# Patient Record
Sex: FEMALE | Race: WHITE | NOT HISPANIC OR LATINO | URBAN - METROPOLITAN AREA
[De-identification: names, ages, dates, MRNs, and addresses within clinical notes are randomized per-mention and may not be internally consistent; named-entity substitution may affect disease eponyms.]

---

## 2017-12-11 ENCOUNTER — IMPORTED ENCOUNTER (OUTPATIENT)
Dept: URBAN - METROPOLITAN AREA CLINIC 50 | Facility: CLINIC | Age: 67
End: 2017-12-11

## 2017-12-11 NOTE — PATIENT DISCUSSION
"""Continue Xalatan both eyes at bedtime. "" ""Reassured patient that intraocular pressures (IOPs) are at target levels and other ocular findings are stable. Continue present management and/or medication(s).  Follow up as directed. """

## 2018-06-11 ENCOUNTER — IMPORTED ENCOUNTER (OUTPATIENT)
Dept: URBAN - METROPOLITAN AREA CLINIC 50 | Facility: CLINIC | Age: 68
End: 2018-06-11

## 2018-06-11 NOTE — PATIENT DISCUSSION
"""Dr Paulette Markham to review results with patient today"" ""Reassured patient that intraocular pressures (IOPs) are at target levels and other ocular findings are stable. Continue present management and/or medication(s).  Follow up as directed. "" ""Continue Xalatan both eyes at bedtime. """

## 2018-12-03 ENCOUNTER — IMPORTED ENCOUNTER (OUTPATIENT)
Dept: URBAN - METROPOLITAN AREA CLINIC 50 | Facility: CLINIC | Age: 68
End: 2018-12-03

## 2018-12-03 NOTE — PATIENT DISCUSSION
"""Reassured patient that intraocular pressures (IOPs) are at target levels and other ocular findings are stable. Continue present management and/or medication(s).  Follow up as directed. "" ""Continue Xalatan both eyes at bedtime ."""

## 2019-06-17 ENCOUNTER — IMPORTED ENCOUNTER (OUTPATIENT)
Dept: URBAN - METROPOLITAN AREA CLINIC 50 | Facility: CLINIC | Age: 69
End: 2019-06-17

## 2019-06-17 NOTE — PATIENT DISCUSSION
"""Dr. Armida Cintron to review test results with patient at todays appointment. "" ""Continue Xalatan both eyes at bedtime ."""

## 2021-04-18 ASSESSMENT — VISUAL ACUITY
OS_CC: 20/25+2
OD_BAT: 20/30
OS_OTHER: 20/30. 20/30-.
OD_CC: J1+(-1)
OS_OTHER: 20/30-1. 20/60.
OS_CC: 20/30+
OD_OTHER: 20/40-. 20/100.
OD_CC: J1+
OS_CC: 20/30+2
OD_CC: 20/40-1
OD_CC: 20/30
OD_CC: 20/30
OS_CC: J1+
OS_CC: J1+(-1)
OS_BAT: 20/30-1
OS_BAT: 20/30
OD_OTHER: 20/30. 20/50.
OS_CC: 20/30-1
OD_BAT: 20/40-
OD_CC: 20/40

## 2021-04-18 ASSESSMENT — TONOMETRY
OD_IOP_MMHG: 17
OD_IOP_MMHG: 17
OS_IOP_MMHG: 15
OS_IOP_MMHG: 12
OS_IOP_MMHG: 15
OS_IOP_MMHG: 18
OD_IOP_MMHG: 14
OD_IOP_MMHG: 14
OD_IOP_MMHG: 21
OD_IOP_MMHG: 14
OS_IOP_MMHG: 21
OS_IOP_MMHG: 14
OS_IOP_MMHG: 17
OS_IOP_MMHG: 18
OD_IOP_MMHG: 18
OD_IOP_MMHG: 11

## 2021-04-18 ASSESSMENT — PACHYMETRY
OS_CT_UM: 601
OD_CT_UM: 600
OD_CT_UM: 600
OS_CT_UM: 601
OD_CT_UM: 600
OD_CT_UM: 600

## 2021-05-30 ENCOUNTER — APPOINTMENT (OUTPATIENT)
Dept: GENERAL RADIOLOGY | Facility: HOSPITAL | Age: 71
End: 2021-05-30

## 2021-05-30 ENCOUNTER — HOSPITAL ENCOUNTER (INPATIENT)
Facility: HOSPITAL | Age: 71
LOS: 5 days | Discharge: NURSING, REHAB OR LONG-TERM CARE | End: 2021-06-04
Attending: FAMILY MEDICINE | Admitting: INTERNAL MEDICINE

## 2021-05-30 ENCOUNTER — APPOINTMENT (OUTPATIENT)
Dept: CT IMAGING | Facility: HOSPITAL | Age: 71
End: 2021-05-30

## 2021-05-30 DIAGNOSIS — N39.0 ACUTE UTI: ICD-10-CM

## 2021-05-30 DIAGNOSIS — S80.01XA CONTUSION OF RIGHT KNEE, INITIAL ENCOUNTER: ICD-10-CM

## 2021-05-30 DIAGNOSIS — S16.1XXA STRAIN OF NECK MUSCLE, INITIAL ENCOUNTER: ICD-10-CM

## 2021-05-30 DIAGNOSIS — S43.401A SPRAIN OF RIGHT SHOULDER, UNSPECIFIED SHOULDER SPRAIN TYPE, INITIAL ENCOUNTER: Primary | ICD-10-CM

## 2021-05-30 LAB
A-A DO2: 29.2 MMHG (ref 0–300)
ALBUMIN SERPL-MCNC: 4.17 G/DL (ref 3.5–5.2)
ALBUMIN/GLOB SERPL: 1 G/DL
ALP SERPL-CCNC: 133 U/L (ref 39–117)
ALT SERPL W P-5'-P-CCNC: 17 U/L (ref 1–33)
ANION GAP SERPL CALCULATED.3IONS-SCNC: 17 MMOL/L (ref 5–15)
ARTERIAL PATENCY WRIST A: POSITIVE
AST SERPL-CCNC: 17 U/L (ref 1–32)
ATMOSPHERIC PRESS: 734 MMHG
BACTERIA UR QL AUTO: ABNORMAL /HPF
BASE EXCESS BLDA CALC-SCNC: 6.6 MMOL/L (ref 0–2)
BASOPHILS # BLD MANUAL: 0.38 10*3/MM3 (ref 0–0.2)
BASOPHILS NFR BLD AUTO: 2 % (ref 0–1.5)
BDY SITE: ABNORMAL
BILIRUB SERPL-MCNC: 1 MG/DL (ref 0–1.2)
BILIRUB UR QL STRIP: NEGATIVE
BODY TEMPERATURE: 0 C
BUN SERPL-MCNC: 13 MG/DL (ref 8–23)
BUN/CREAT SERPL: 12.1 (ref 7–25)
CALCIUM SPEC-SCNC: 10.1 MG/DL (ref 8.6–10.5)
CHLORIDE SERPL-SCNC: 93 MMOL/L (ref 98–107)
CLARITY UR: ABNORMAL
CO2 BLDA-SCNC: 31.9 MMOL/L (ref 22–33)
CO2 SERPL-SCNC: 26 MMOL/L (ref 22–29)
COHGB MFR BLD: 1 % (ref 0–5)
COLOR UR: YELLOW
CREAT SERPL-MCNC: 1.07 MG/DL (ref 0.57–1)
CRP SERPL-MCNC: 1.3 MG/DL (ref 0–0.5)
D-LACTATE SERPL-SCNC: 1.9 MMOL/L (ref 0.5–2)
DEPRECATED RDW RBC AUTO: 42 FL (ref 37–54)
EOSINOPHIL # BLD MANUAL: 0.19 10*3/MM3 (ref 0–0.4)
EOSINOPHIL NFR BLD MANUAL: 1 % (ref 0.3–6.2)
ERYTHROCYTE [DISTWIDTH] IN BLOOD BY AUTOMATED COUNT: 12.5 % (ref 12.3–15.4)
FLUAV RNA RESP QL NAA+PROBE: NOT DETECTED
FLUBV RNA RESP QL NAA+PROBE: NOT DETECTED
GFR SERPL CREATININE-BSD FRML MDRD: 50 ML/MIN/1.73
GLOBULIN UR ELPH-MCNC: 4 GM/DL
GLUCOSE SERPL-MCNC: 309 MG/DL (ref 65–99)
GLUCOSE UR STRIP-MCNC: ABNORMAL MG/DL
HCO3 BLDA-SCNC: 30.6 MMOL/L (ref 20–26)
HCT VFR BLD AUTO: 52.5 % (ref 34–46.6)
HCT VFR BLD CALC: 49 % (ref 38–51)
HGB BLD-MCNC: 17 G/DL (ref 12–15.9)
HGB BLDA-MCNC: 16 G/DL (ref 13.5–17.5)
HGB UR QL STRIP.AUTO: NEGATIVE
HYALINE CASTS UR QL AUTO: ABNORMAL /LPF
HYPOCHROMIA BLD QL: ABNORMAL
INHALED O2 CONCENTRATION: 21 %
KETONES UR QL STRIP: ABNORMAL
LEUKOCYTE ESTERASE UR QL STRIP.AUTO: ABNORMAL
LYMPHOCYTES # BLD MANUAL: 5.92 10*3/MM3 (ref 0.7–3.1)
LYMPHOCYTES NFR BLD MANUAL: 31 % (ref 19.6–45.3)
LYMPHOCYTES NFR BLD MANUAL: 8 % (ref 5–12)
Lab: ABNORMAL
MCH RBC QN AUTO: 29.6 PG (ref 26.6–33)
MCHC RBC AUTO-ENTMCNC: 32.4 G/DL (ref 31.5–35.7)
MCV RBC AUTO: 91.5 FL (ref 79–97)
METHGB BLD QL: 0.1 % (ref 0–3)
MODALITY: ABNORMAL
MONOCYTES # BLD AUTO: 1.53 10*3/MM3 (ref 0.1–0.9)
NEUTROPHILS # BLD AUTO: 11.08 10*3/MM3 (ref 1.7–7)
NEUTROPHILS NFR BLD MANUAL: 56 % (ref 42.7–76)
NEUTS BAND NFR BLD MANUAL: 2 % (ref 0–5)
NITRITE UR QL STRIP: POSITIVE
NOTE: ABNORMAL
NOTIFIED BY: ABNORMAL
NOTIFIED WHO: ABNORMAL
OXYHGB MFR BLDV: 94 % (ref 94–99)
PCO2 BLDA: 40.6 MM HG (ref 35–45)
PCO2 TEMP ADJ BLD: ABNORMAL MM[HG]
PH BLDA: 7.49 PH UNITS (ref 7.35–7.45)
PH UR STRIP.AUTO: 5.5 [PH] (ref 5–8)
PH, TEMP CORRECTED: ABNORMAL
PLAT MORPH BLD: NORMAL
PLATELET # BLD AUTO: 358 10*3/MM3 (ref 140–450)
PMV BLD AUTO: 8.5 FL (ref 6–12)
PO2 BLDA: 69.2 MM HG (ref 83–108)
PO2 TEMP ADJ BLD: ABNORMAL MM[HG]
POTASSIUM SERPL-SCNC: 3.8 MMOL/L (ref 3.5–5.2)
PROT SERPL-MCNC: 8.2 G/DL (ref 6–8.5)
PROT UR QL STRIP: ABNORMAL
RBC # BLD AUTO: 5.74 10*6/MM3 (ref 3.77–5.28)
RBC # UR: ABNORMAL /HPF
REF LAB TEST METHOD: ABNORMAL
SAO2 % BLDCOA: 95 % (ref 94–99)
SARS-COV-2 RNA RESP QL NAA+PROBE: NOT DETECTED
SODIUM SERPL-SCNC: 136 MMOL/L (ref 136–145)
SP GR UR STRIP: 1.02 (ref 1–1.03)
SQUAMOUS #/AREA URNS HPF: ABNORMAL /HPF
UROBILINOGEN UR QL STRIP: ABNORMAL
VENTILATOR MODE: ABNORMAL
WBC # BLD AUTO: 19.1 10*3/MM3 (ref 3.4–10.8)
WBC UR QL AUTO: ABNORMAL /HPF

## 2021-05-30 PROCEDURE — 73564 X-RAY EXAM KNEE 4 OR MORE: CPT

## 2021-05-30 PROCEDURE — 93005 ELECTROCARDIOGRAM TRACING: CPT | Performed by: HOSPITALIST

## 2021-05-30 PROCEDURE — 73030 X-RAY EXAM OF SHOULDER: CPT

## 2021-05-30 PROCEDURE — 83050 HGB METHEMOGLOBIN QUAN: CPT

## 2021-05-30 PROCEDURE — 80053 COMPREHEN METABOLIC PANEL: CPT | Performed by: EMERGENCY MEDICINE

## 2021-05-30 PROCEDURE — 25010000002 CEFTRIAXONE PER 250 MG: Performed by: EMERGENCY MEDICINE

## 2021-05-30 PROCEDURE — 87040 BLOOD CULTURE FOR BACTERIA: CPT | Performed by: EMERGENCY MEDICINE

## 2021-05-30 PROCEDURE — 86140 C-REACTIVE PROTEIN: CPT | Performed by: HOSPITALIST

## 2021-05-30 PROCEDURE — 87086 URINE CULTURE/COLONY COUNT: CPT | Performed by: EMERGENCY MEDICINE

## 2021-05-30 PROCEDURE — 87186 SC STD MICRODIL/AGAR DIL: CPT | Performed by: EMERGENCY MEDICINE

## 2021-05-30 PROCEDURE — 85007 BL SMEAR W/DIFF WBC COUNT: CPT | Performed by: EMERGENCY MEDICINE

## 2021-05-30 PROCEDURE — 99285 EMERGENCY DEPT VISIT HI MDM: CPT

## 2021-05-30 PROCEDURE — 82009 KETONE BODYS QUAL: CPT | Performed by: HOSPITALIST

## 2021-05-30 PROCEDURE — 87077 CULTURE AEROBIC IDENTIFY: CPT | Performed by: EMERGENCY MEDICINE

## 2021-05-30 PROCEDURE — 82375 ASSAY CARBOXYHB QUANT: CPT

## 2021-05-30 PROCEDURE — 99223 1ST HOSP IP/OBS HIGH 75: CPT | Performed by: PHYSICIAN ASSISTANT

## 2021-05-30 PROCEDURE — 36600 WITHDRAWAL OF ARTERIAL BLOOD: CPT

## 2021-05-30 PROCEDURE — 85025 COMPLETE CBC W/AUTO DIFF WBC: CPT | Performed by: EMERGENCY MEDICINE

## 2021-05-30 PROCEDURE — 70450 CT HEAD/BRAIN W/O DYE: CPT

## 2021-05-30 PROCEDURE — 82805 BLOOD GASES W/O2 SATURATION: CPT

## 2021-05-30 PROCEDURE — 74176 CT ABD & PELVIS W/O CONTRAST: CPT

## 2021-05-30 PROCEDURE — 73200 CT UPPER EXTREMITY W/O DYE: CPT

## 2021-05-30 PROCEDURE — 72125 CT NECK SPINE W/O DYE: CPT

## 2021-05-30 PROCEDURE — 72131 CT LUMBAR SPINE W/O DYE: CPT

## 2021-05-30 PROCEDURE — 73610 X-RAY EXAM OF ANKLE: CPT

## 2021-05-30 PROCEDURE — 83605 ASSAY OF LACTIC ACID: CPT | Performed by: EMERGENCY MEDICINE

## 2021-05-30 PROCEDURE — 81001 URINALYSIS AUTO W/SCOPE: CPT | Performed by: EMERGENCY MEDICINE

## 2021-05-30 PROCEDURE — 87636 SARSCOV2 & INF A&B AMP PRB: CPT | Performed by: EMERGENCY MEDICINE

## 2021-05-30 RX ORDER — POLYETHYLENE GLYCOL 3350 17 G/17G
17 POWDER, FOR SOLUTION ORAL 2 TIMES DAILY PRN
Status: DISCONTINUED | OUTPATIENT
Start: 2021-05-30 | End: 2021-06-04 | Stop reason: HOSPADM

## 2021-05-30 RX ORDER — DOCUSATE SODIUM 100 MG/1
100 CAPSULE, LIQUID FILLED ORAL 2 TIMES DAILY
Status: DISCONTINUED | OUTPATIENT
Start: 2021-05-31 | End: 2021-06-04 | Stop reason: HOSPADM

## 2021-05-30 RX ORDER — INSULIN GLARGINE 100 [IU]/ML
45 INJECTION, SOLUTION SUBCUTANEOUS DAILY
COMMUNITY
End: 2021-12-14

## 2021-05-30 RX ORDER — DEXTROSE MONOHYDRATE 25 G/50ML
25 INJECTION, SOLUTION INTRAVENOUS
Status: DISCONTINUED | OUTPATIENT
Start: 2021-05-30 | End: 2021-06-04 | Stop reason: HOSPADM

## 2021-05-30 RX ORDER — BISACODYL 10 MG
10 SUPPOSITORY, RECTAL RECTAL ONCE AS NEEDED
Status: DISCONTINUED | OUTPATIENT
Start: 2021-05-30 | End: 2021-06-04 | Stop reason: HOSPADM

## 2021-05-30 RX ORDER — TIZANIDINE 2 MG/1
2 TABLET ORAL 2 TIMES DAILY
COMMUNITY

## 2021-05-30 RX ORDER — ESOMEPRAZOLE MAGNESIUM 40 MG/1
40 CAPSULE, DELAYED RELEASE ORAL
COMMUNITY
End: 2023-01-23

## 2021-05-30 RX ORDER — DULOXETIN HYDROCHLORIDE 60 MG/1
60 CAPSULE, DELAYED RELEASE ORAL DAILY
COMMUNITY
End: 2021-12-18 | Stop reason: HOSPADM

## 2021-05-30 RX ORDER — HYDROCODONE BITARTRATE AND ACETAMINOPHEN 5; 325 MG/1; MG/1
1 TABLET ORAL EVERY 6 HOURS PRN
Qty: 10 TABLET | Refills: 0 | Status: SHIPPED | OUTPATIENT
Start: 2021-05-30 | End: 2021-06-04

## 2021-05-30 RX ORDER — ALBUTEROL SULFATE 90 UG/1
2 AEROSOL, METERED RESPIRATORY (INHALATION) EVERY 6 HOURS PRN
COMMUNITY

## 2021-05-30 RX ORDER — DONEPEZIL HYDROCHLORIDE 5 MG/1
5 TABLET, FILM COATED ORAL DAILY
COMMUNITY

## 2021-05-30 RX ORDER — POTASSIUM CHLORIDE 750 MG/1
10 TABLET, FILM COATED, EXTENDED RELEASE ORAL DAILY
COMMUNITY

## 2021-05-30 RX ORDER — TRIAMTERENE AND HYDROCHLOROTHIAZIDE 37.5; 25 MG/1; MG/1
1 TABLET ORAL DAILY
COMMUNITY

## 2021-05-30 RX ORDER — BUDESONIDE AND FORMOTEROL FUMARATE DIHYDRATE 80; 4.5 UG/1; UG/1
2 AEROSOL RESPIRATORY (INHALATION)
Status: ON HOLD | COMMUNITY
End: 2021-05-31

## 2021-05-30 RX ORDER — TRAZODONE HYDROCHLORIDE 100 MG/1
100 TABLET ORAL NIGHTLY
Status: ON HOLD | COMMUNITY
End: 2021-05-31

## 2021-05-30 RX ORDER — BENZONATATE 100 MG/1
100 CAPSULE ORAL 3 TIMES DAILY PRN
COMMUNITY
End: 2021-12-14

## 2021-05-30 RX ORDER — FUROSEMIDE 20 MG/1
10 TABLET ORAL DAILY
Status: ON HOLD | COMMUNITY
End: 2021-05-31

## 2021-05-30 RX ORDER — MONTELUKAST SODIUM 10 MG/1
10 TABLET ORAL DAILY
COMMUNITY

## 2021-05-30 RX ORDER — SODIUM CHLORIDE 0.9 % (FLUSH) 0.9 %
10 SYRINGE (ML) INJECTION AS NEEDED
Status: DISCONTINUED | OUTPATIENT
Start: 2021-05-30 | End: 2021-06-04 | Stop reason: HOSPADM

## 2021-05-30 RX ORDER — LOSARTAN POTASSIUM 50 MG/1
50 TABLET ORAL DAILY
Status: ON HOLD | COMMUNITY
End: 2021-05-31

## 2021-05-30 RX ORDER — NICOTINE POLACRILEX 4 MG
15 LOZENGE BUCCAL
Status: DISCONTINUED | OUTPATIENT
Start: 2021-05-30 | End: 2021-06-04 | Stop reason: HOSPADM

## 2021-05-30 RX ADMIN — CEFTRIAXONE 1 G: 1 INJECTION, POWDER, FOR SOLUTION INTRAMUSCULAR; INTRAVENOUS at 23:09

## 2021-05-31 ENCOUNTER — APPOINTMENT (OUTPATIENT)
Dept: ULTRASOUND IMAGING | Facility: HOSPITAL | Age: 71
End: 2021-05-31

## 2021-05-31 ENCOUNTER — APPOINTMENT (OUTPATIENT)
Dept: GENERAL RADIOLOGY | Facility: HOSPITAL | Age: 71
End: 2021-05-31

## 2021-05-31 ENCOUNTER — APPOINTMENT (OUTPATIENT)
Dept: CARDIOLOGY | Facility: HOSPITAL | Age: 71
End: 2021-05-31

## 2021-05-31 PROBLEM — F03.90 DEMENTIA (HCC): Chronic | Status: ACTIVE | Noted: 2021-05-31

## 2021-05-31 PROBLEM — K57.90 DIVERTICULOSIS: Chronic | Status: ACTIVE | Noted: 2021-05-31

## 2021-05-31 PROBLEM — M80.80XA OSTEOPOROSIS WITH FRACTURE: Chronic | Status: ACTIVE | Noted: 2021-05-31

## 2021-05-31 PROBLEM — I48.0 PAROXYSMAL ATRIAL FIBRILLATION (HCC): Chronic | Status: ACTIVE | Noted: 2021-05-31

## 2021-05-31 PROBLEM — Z79.4 TYPE 2 DIABETES MELLITUS, WITH LONG-TERM CURRENT USE OF INSULIN: Chronic | Status: ACTIVE | Noted: 2021-05-31

## 2021-05-31 PROBLEM — J44.9 COPD (CHRONIC OBSTRUCTIVE PULMONARY DISEASE): Chronic | Status: ACTIVE | Noted: 2021-05-31

## 2021-05-31 PROBLEM — E11.9 TYPE 2 DIABETES MELLITUS, WITH LONG-TERM CURRENT USE OF INSULIN: Chronic | Status: ACTIVE | Noted: 2021-05-31

## 2021-05-31 PROBLEM — K21.9 GERD WITHOUT ESOPHAGITIS: Chronic | Status: ACTIVE | Noted: 2021-05-31

## 2021-05-31 LAB
ACETONE BLD QL: NEGATIVE
ALBUMIN SERPL-MCNC: 3.78 G/DL (ref 3.5–5.2)
ALBUMIN/GLOB SERPL: 1.1 G/DL
ALP SERPL-CCNC: 118 U/L (ref 39–117)
ALT SERPL W P-5'-P-CCNC: 13 U/L (ref 1–33)
ANION GAP SERPL CALCULATED.3IONS-SCNC: 13.7 MMOL/L (ref 5–15)
AST SERPL-CCNC: 14 U/L (ref 1–32)
BASOPHILS # BLD AUTO: 0.06 10*3/MM3 (ref 0–0.2)
BASOPHILS NFR BLD AUTO: 0.4 % (ref 0–1.5)
BH CV ECHO MEAS - % IVS THICK: 36.1 %
BH CV ECHO MEAS - % LVPW THICK: 61.7 %
BH CV ECHO MEAS - ACS: 2 CM
BH CV ECHO MEAS - AO ROOT AREA (BSA CORRECTED): 1.5
BH CV ECHO MEAS - AO ROOT AREA: 7.8 CM^2
BH CV ECHO MEAS - AO ROOT DIAM: 3.2 CM
BH CV ECHO MEAS - BSA(HAYCOCK): 2.3 M^2
BH CV ECHO MEAS - BSA: 2.2 M^2
BH CV ECHO MEAS - BZI_BMI: 45.5 KILOGRAMS/M^2
BH CV ECHO MEAS - BZI_METRIC_HEIGHT: 160 CM
BH CV ECHO MEAS - BZI_METRIC_WEIGHT: 116.6 KG
BH CV ECHO MEAS - EDV(CUBED): 42.1 ML
BH CV ECHO MEAS - EDV(MOD-SP4): 37.1 ML
BH CV ECHO MEAS - EDV(TEICH): 50.2 ML
BH CV ECHO MEAS - EF(CUBED): 78.8 %
BH CV ECHO MEAS - EF(MOD-SP4): 44.7 %
BH CV ECHO MEAS - EF(TEICH): 72.1 %
BH CV ECHO MEAS - ESV(CUBED): 8.9 ML
BH CV ECHO MEAS - ESV(MOD-SP4): 20.5 ML
BH CV ECHO MEAS - ESV(TEICH): 14 ML
BH CV ECHO MEAS - FS: 40.4 %
BH CV ECHO MEAS - IVS/LVPW: 1.2
BH CV ECHO MEAS - IVSD: 1.5 CM
BH CV ECHO MEAS - IVSS: 2 CM
BH CV ECHO MEAS - LA DIMENSION: 4.3 CM
BH CV ECHO MEAS - LA/AO: 1.4
BH CV ECHO MEAS - LV DIASTOLIC VOL/BSA (35-75): 17.2 ML/M^2
BH CV ECHO MEAS - LV MASS(C)D: 157.7 GRAMS
BH CV ECHO MEAS - LV MASS(C)DI: 73.3 GRAMS/M^2
BH CV ECHO MEAS - LV MASS(C)S: 172.4 GRAMS
BH CV ECHO MEAS - LV MASS(C)SI: 80.2 GRAMS/M^2
BH CV ECHO MEAS - LV SYSTOLIC VOL/BSA (12-30): 9.5 ML/M^2
BH CV ECHO MEAS - LVIDD: 3.5 CM
BH CV ECHO MEAS - LVIDS: 2.1 CM
BH CV ECHO MEAS - LVLD AP4: 5.8 CM
BH CV ECHO MEAS - LVLS AP4: 6.3 CM
BH CV ECHO MEAS - LVOT AREA (M): 2.5 CM^2
BH CV ECHO MEAS - LVOT AREA: 2.5 CM^2
BH CV ECHO MEAS - LVOT DIAM: 1.8 CM
BH CV ECHO MEAS - LVPWD: 1.2 CM
BH CV ECHO MEAS - LVPWS: 1.9 CM
BH CV ECHO MEAS - MV A MAX VEL: 115 CM/SEC
BH CV ECHO MEAS - MV E MAX VEL: 69 CM/SEC
BH CV ECHO MEAS - MV E/A: 0.6
BH CV ECHO MEAS - PA ACC TIME: 0.12 SEC
BH CV ECHO MEAS - PA PR(ACCEL): 23.7 MMHG
BH CV ECHO MEAS - RVDD: 3.6 CM
BH CV ECHO MEAS - SI(CUBED): 15.4 ML/M^2
BH CV ECHO MEAS - SI(MOD-SP4): 7.7 ML/M^2
BH CV ECHO MEAS - SI(TEICH): 16.8 ML/M^2
BH CV ECHO MEAS - SV(CUBED): 33.2 ML
BH CV ECHO MEAS - SV(MOD-SP4): 16.6 ML
BH CV ECHO MEAS - SV(TEICH): 36.2 ML
BILIRUB SERPL-MCNC: 0.9 MG/DL (ref 0–1.2)
BUN SERPL-MCNC: 13 MG/DL (ref 8–23)
BUN/CREAT SERPL: 13.5 (ref 7–25)
CALCIUM SPEC-SCNC: 9.2 MG/DL (ref 8.6–10.5)
CHLORIDE SERPL-SCNC: 95 MMOL/L (ref 98–107)
CO2 SERPL-SCNC: 26.3 MMOL/L (ref 22–29)
CREAT SERPL-MCNC: 0.96 MG/DL (ref 0.57–1)
CRP SERPL-MCNC: 1.45 MG/DL (ref 0–0.5)
DEPRECATED RDW RBC AUTO: 42.7 FL (ref 37–54)
EOSINOPHIL # BLD AUTO: 0.16 10*3/MM3 (ref 0–0.4)
EOSINOPHIL NFR BLD AUTO: 1.2 % (ref 0.3–6.2)
ERYTHROCYTE [DISTWIDTH] IN BLOOD BY AUTOMATED COUNT: 12.6 % (ref 12.3–15.4)
GFR SERPL CREATININE-BSD FRML MDRD: 57 ML/MIN/1.73
GLOBULIN UR ELPH-MCNC: 3.4 GM/DL
GLUCOSE BLDC GLUCOMTR-MCNC: 230 MG/DL (ref 70–130)
GLUCOSE BLDC GLUCOMTR-MCNC: 283 MG/DL (ref 70–130)
GLUCOSE BLDC GLUCOMTR-MCNC: 300 MG/DL (ref 70–130)
GLUCOSE BLDC GLUCOMTR-MCNC: 326 MG/DL (ref 70–130)
GLUCOSE BLDC GLUCOMTR-MCNC: 343 MG/DL (ref 70–130)
GLUCOSE SERPL-MCNC: 324 MG/DL (ref 65–99)
HBA1C MFR BLD: 11.4 % (ref 4.8–5.6)
HCT VFR BLD AUTO: 46.5 % (ref 34–46.6)
HGB BLD-MCNC: 15.2 G/DL (ref 12–15.9)
IMM GRANULOCYTES # BLD AUTO: 0.03 10*3/MM3 (ref 0–0.05)
IMM GRANULOCYTES NFR BLD AUTO: 0.2 % (ref 0–0.5)
LYMPHOCYTES # BLD AUTO: 3.06 10*3/MM3 (ref 0.7–3.1)
LYMPHOCYTES NFR BLD AUTO: 22.6 % (ref 19.6–45.3)
MAXIMAL PREDICTED HEART RATE: 147 BPM
MCH RBC QN AUTO: 30 PG (ref 26.6–33)
MCHC RBC AUTO-ENTMCNC: 32.7 G/DL (ref 31.5–35.7)
MCV RBC AUTO: 91.9 FL (ref 79–97)
MONOCYTES # BLD AUTO: 0.73 10*3/MM3 (ref 0.1–0.9)
MONOCYTES NFR BLD AUTO: 5.4 % (ref 5–12)
NEUTROPHILS NFR BLD AUTO: 70.2 % (ref 42.7–76)
NEUTROPHILS NFR BLD AUTO: 9.47 10*3/MM3 (ref 1.7–7)
NRBC BLD AUTO-RTO: 0 /100 WBC (ref 0–0.2)
PLATELET # BLD AUTO: 284 10*3/MM3 (ref 140–450)
PMV BLD AUTO: 8.4 FL (ref 6–12)
POTASSIUM SERPL-SCNC: 3.4 MMOL/L (ref 3.5–5.2)
PROT SERPL-MCNC: 7.2 G/DL (ref 6–8.5)
RBC # BLD AUTO: 5.06 10*6/MM3 (ref 3.77–5.28)
SODIUM SERPL-SCNC: 135 MMOL/L (ref 136–145)
STRESS TARGET HR: 125 BPM
TROPONIN T SERPL-MCNC: <0.01 NG/ML (ref 0–0.03)
TROPONIN T SERPL-MCNC: <0.01 NG/ML (ref 0–0.03)
WBC # BLD AUTO: 13.51 10*3/MM3 (ref 3.4–10.8)

## 2021-05-31 PROCEDURE — 93880 EXTRACRANIAL BILAT STUDY: CPT | Performed by: RADIOLOGY

## 2021-05-31 PROCEDURE — 93306 TTE W/DOPPLER COMPLETE: CPT

## 2021-05-31 PROCEDURE — 99232 SBSQ HOSP IP/OBS MODERATE 35: CPT | Performed by: INTERNAL MEDICINE

## 2021-05-31 PROCEDURE — 97166 OT EVAL MOD COMPLEX 45 MIN: CPT

## 2021-05-31 PROCEDURE — 97163 PT EVAL HIGH COMPLEX 45 MIN: CPT

## 2021-05-31 PROCEDURE — 63710000001 INSULIN ASPART PER 5 UNITS

## 2021-05-31 PROCEDURE — 84484 ASSAY OF TROPONIN QUANT: CPT | Performed by: HOSPITALIST

## 2021-05-31 PROCEDURE — 71045 X-RAY EXAM CHEST 1 VIEW: CPT

## 2021-05-31 PROCEDURE — 25010000002 HEPARIN (PORCINE) PER 1000 UNITS: Performed by: HOSPITALIST

## 2021-05-31 PROCEDURE — 93880 EXTRACRANIAL BILAT STUDY: CPT

## 2021-05-31 PROCEDURE — 63710000001 INSULIN ASPART PER 5 UNITS: Performed by: HOSPITALIST

## 2021-05-31 PROCEDURE — 86140 C-REACTIVE PROTEIN: CPT | Performed by: HOSPITALIST

## 2021-05-31 PROCEDURE — 92610 EVALUATE SWALLOWING FUNCTION: CPT

## 2021-05-31 PROCEDURE — 63710000001 INSULIN DETEMIR PER 5 UNITS: Performed by: HOSPITALIST

## 2021-05-31 PROCEDURE — 83036 HEMOGLOBIN GLYCOSYLATED A1C: CPT | Performed by: HOSPITALIST

## 2021-05-31 PROCEDURE — 80053 COMPREHEN METABOLIC PANEL: CPT | Performed by: HOSPITALIST

## 2021-05-31 PROCEDURE — 94799 UNLISTED PULMONARY SVC/PX: CPT

## 2021-05-31 PROCEDURE — 25010000002 CEFTRIAXONE PER 250 MG: Performed by: HOSPITALIST

## 2021-05-31 PROCEDURE — 85025 COMPLETE CBC W/AUTO DIFF WBC: CPT | Performed by: HOSPITALIST

## 2021-05-31 PROCEDURE — 93306 TTE W/DOPPLER COMPLETE: CPT | Performed by: INTERNAL MEDICINE

## 2021-05-31 PROCEDURE — 93010 ELECTROCARDIOGRAM REPORT: CPT | Performed by: INTERNAL MEDICINE

## 2021-05-31 PROCEDURE — 82962 GLUCOSE BLOOD TEST: CPT

## 2021-05-31 RX ORDER — ACETAMINOPHEN 325 MG/1
650 TABLET ORAL EVERY 6 HOURS PRN
Status: DISCONTINUED | OUTPATIENT
Start: 2021-05-31 | End: 2021-06-04 | Stop reason: HOSPADM

## 2021-05-31 RX ORDER — ALBUTEROL SULFATE 2.5 MG/3ML
2.5 SOLUTION RESPIRATORY (INHALATION) EVERY 6 HOURS PRN
Status: CANCELLED | OUTPATIENT
Start: 2021-05-31

## 2021-05-31 RX ORDER — TIZANIDINE 4 MG/1
2 TABLET ORAL 2 TIMES DAILY
Status: CANCELLED | OUTPATIENT
Start: 2021-05-31

## 2021-05-31 RX ORDER — GUAIFENESIN/DEXTROMETHORPHAN 100-10MG/5
5 SYRUP ORAL ONCE
Status: COMPLETED | OUTPATIENT
Start: 2021-05-31 | End: 2021-05-31

## 2021-05-31 RX ORDER — SODIUM CHLORIDE 9 MG/ML
75 INJECTION, SOLUTION INTRAVENOUS CONTINUOUS
Status: DISCONTINUED | OUTPATIENT
Start: 2021-05-31 | End: 2021-06-04 | Stop reason: HOSPADM

## 2021-05-31 RX ORDER — NICOTINE POLACRILEX 4 MG
15 LOZENGE BUCCAL
Status: DISCONTINUED | OUTPATIENT
Start: 2021-05-31 | End: 2021-06-04 | Stop reason: HOSPADM

## 2021-05-31 RX ORDER — FUROSEMIDE 20 MG/1
20 TABLET ORAL
COMMUNITY

## 2021-05-31 RX ORDER — SODIUM CHLORIDE 0.9 % (FLUSH) 0.9 %
10 SYRINGE (ML) INJECTION AS NEEDED
Status: DISCONTINUED | OUTPATIENT
Start: 2021-05-31 | End: 2021-06-04 | Stop reason: HOSPADM

## 2021-05-31 RX ORDER — AMLODIPINE BESYLATE 5 MG/1
5 TABLET ORAL DAILY
COMMUNITY
End: 2021-12-14

## 2021-05-31 RX ORDER — HEPARIN SODIUM 5000 [USP'U]/ML
5000 INJECTION, SOLUTION INTRAVENOUS; SUBCUTANEOUS EVERY 12 HOURS SCHEDULED
Status: DISCONTINUED | OUTPATIENT
Start: 2021-05-31 | End: 2021-06-04 | Stop reason: HOSPADM

## 2021-05-31 RX ORDER — TRIAMTERENE AND HYDROCHLOROTHIAZIDE 37.5; 25 MG/1; MG/1
1 TABLET ORAL DAILY
Status: CANCELLED | OUTPATIENT
Start: 2021-05-31

## 2021-05-31 RX ORDER — DEXTROSE MONOHYDRATE 25 G/50ML
25 INJECTION, SOLUTION INTRAVENOUS
Status: DISCONTINUED | OUTPATIENT
Start: 2021-05-31 | End: 2021-06-04 | Stop reason: HOSPADM

## 2021-05-31 RX ORDER — SODIUM CHLORIDE 0.9 % (FLUSH) 0.9 %
10 SYRINGE (ML) INJECTION EVERY 12 HOURS SCHEDULED
Status: DISCONTINUED | OUTPATIENT
Start: 2021-05-31 | End: 2021-06-04 | Stop reason: HOSPADM

## 2021-05-31 RX ADMIN — DOCUSATE SODIUM 100 MG: 100 CAPSULE, LIQUID FILLED ORAL at 20:33

## 2021-05-31 RX ADMIN — INSULIN ASPART 7 UNITS: 100 INJECTION, SOLUTION INTRAVENOUS; SUBCUTANEOUS at 11:14

## 2021-05-31 RX ADMIN — HEPARIN SODIUM 5000 UNITS: 5000 INJECTION INTRAVENOUS; SUBCUTANEOUS at 08:12

## 2021-05-31 RX ADMIN — GUAIFENESIN AND DEXTROMETHORPHAN 5 ML: 100; 10 SYRUP ORAL at 20:45

## 2021-05-31 RX ADMIN — CEFTRIAXONE 1 G: 1 INJECTION, POWDER, FOR SOLUTION INTRAMUSCULAR; INTRAVENOUS at 20:33

## 2021-05-31 RX ADMIN — SODIUM CHLORIDE 75 ML/HR: 9 INJECTION, SOLUTION INTRAVENOUS at 00:56

## 2021-05-31 RX ADMIN — INSULIN ASPART 10 UNITS: 100 INJECTION, SOLUTION INTRAVENOUS; SUBCUTANEOUS at 01:42

## 2021-05-31 RX ADMIN — DOCUSATE SODIUM 100 MG: 100 CAPSULE, LIQUID FILLED ORAL at 08:11

## 2021-05-31 RX ADMIN — HEPARIN SODIUM 5000 UNITS: 5000 INJECTION INTRAVENOUS; SUBCUTANEOUS at 01:30

## 2021-05-31 RX ADMIN — INSULIN ASPART 6 UNITS: 100 INJECTION, SOLUTION INTRAVENOUS; SUBCUTANEOUS at 16:42

## 2021-05-31 RX ADMIN — SODIUM CHLORIDE, PRESERVATIVE FREE 10 ML: 5 INJECTION INTRAVENOUS at 01:30

## 2021-05-31 RX ADMIN — ACETAMINOPHEN 650 MG: 325 TABLET ORAL at 02:37

## 2021-05-31 RX ADMIN — SODIUM CHLORIDE 75 ML/HR: 9 INJECTION, SOLUTION INTRAVENOUS at 13:18

## 2021-05-31 RX ADMIN — HEPARIN SODIUM 5000 UNITS: 5000 INJECTION INTRAVENOUS; SUBCUTANEOUS at 20:33

## 2021-05-31 RX ADMIN — INSULIN ASPART 4 UNITS: 100 INJECTION, SOLUTION INTRAVENOUS; SUBCUTANEOUS at 08:12

## 2021-05-31 RX ADMIN — INSULIN DETEMIR 40 UNITS: 100 INJECTION, SOLUTION SUBCUTANEOUS at 08:12

## 2021-05-31 RX ADMIN — SODIUM CHLORIDE, PRESERVATIVE FREE 10 ML: 5 INJECTION INTRAVENOUS at 20:34

## 2021-06-01 ENCOUNTER — APPOINTMENT (OUTPATIENT)
Dept: MRI IMAGING | Facility: HOSPITAL | Age: 71
End: 2021-06-01

## 2021-06-01 LAB
BACTERIA SPEC AEROBE CULT: ABNORMAL
GLUCOSE BLDC GLUCOMTR-MCNC: 281 MG/DL (ref 70–130)
GLUCOSE BLDC GLUCOMTR-MCNC: 308 MG/DL (ref 70–130)
GLUCOSE BLDC GLUCOMTR-MCNC: 309 MG/DL (ref 70–130)
GLUCOSE BLDC GLUCOMTR-MCNC: 376 MG/DL (ref 70–130)
QT INTERVAL: 308 MS
QT INTERVAL: 366 MS
QT INTERVAL: 368 MS
QTC INTERVAL: 447 MS
QTC INTERVAL: 455 MS
QTC INTERVAL: 468 MS

## 2021-06-01 PROCEDURE — 94799 UNLISTED PULMONARY SVC/PX: CPT

## 2021-06-01 PROCEDURE — 93010 ELECTROCARDIOGRAM REPORT: CPT | Performed by: INTERNAL MEDICINE

## 2021-06-01 PROCEDURE — 93005 ELECTROCARDIOGRAM TRACING: CPT | Performed by: INTERNAL MEDICINE

## 2021-06-01 PROCEDURE — 25010000002 CEFTRIAXONE PER 250 MG: Performed by: HOSPITALIST

## 2021-06-01 PROCEDURE — 70551 MRI BRAIN STEM W/O DYE: CPT | Performed by: RADIOLOGY

## 2021-06-01 PROCEDURE — 63710000001 INSULIN ASPART PER 5 UNITS: Performed by: HOSPITALIST

## 2021-06-01 PROCEDURE — 70551 MRI BRAIN STEM W/O DYE: CPT

## 2021-06-01 PROCEDURE — 25010000002 HEPARIN (PORCINE) PER 1000 UNITS: Performed by: HOSPITALIST

## 2021-06-01 PROCEDURE — 63710000001 INSULIN DETEMIR PER 5 UNITS: Performed by: HOSPITALIST

## 2021-06-01 PROCEDURE — 99232 SBSQ HOSP IP/OBS MODERATE 35: CPT | Performed by: INTERNAL MEDICINE

## 2021-06-01 PROCEDURE — 99222 1ST HOSP IP/OBS MODERATE 55: CPT | Performed by: SPECIALIST

## 2021-06-01 PROCEDURE — 82962 GLUCOSE BLOOD TEST: CPT

## 2021-06-01 PROCEDURE — 93005 ELECTROCARDIOGRAM TRACING: CPT | Performed by: SPECIALIST

## 2021-06-01 RX ORDER — POTASSIUM CHLORIDE 750 MG/1
10 TABLET, FILM COATED, EXTENDED RELEASE ORAL DAILY
Status: DISCONTINUED | OUTPATIENT
Start: 2021-06-01 | End: 2021-06-04 | Stop reason: HOSPADM

## 2021-06-01 RX ORDER — DULOXETIN HYDROCHLORIDE 30 MG/1
30 CAPSULE, DELAYED RELEASE ORAL DAILY
Status: DISCONTINUED | OUTPATIENT
Start: 2021-06-01 | End: 2021-06-04 | Stop reason: HOSPADM

## 2021-06-01 RX ORDER — LIDOCAINE 50 MG/G
1 PATCH TOPICAL
Status: DISCONTINUED | OUTPATIENT
Start: 2021-06-01 | End: 2021-06-04 | Stop reason: HOSPADM

## 2021-06-01 RX ORDER — METOPROLOL TARTRATE 5 MG/5ML
5 INJECTION INTRAVENOUS ONCE
Status: COMPLETED | OUTPATIENT
Start: 2021-06-01 | End: 2021-06-01

## 2021-06-01 RX ORDER — TIZANIDINE 4 MG/1
2 TABLET ORAL EVERY 12 HOURS PRN
Status: DISCONTINUED | OUTPATIENT
Start: 2021-06-01 | End: 2021-06-04 | Stop reason: HOSPADM

## 2021-06-01 RX ORDER — BENZONATATE 100 MG/1
100 CAPSULE ORAL 3 TIMES DAILY PRN
Status: DISCONTINUED | OUTPATIENT
Start: 2021-06-01 | End: 2021-06-01 | Stop reason: SDUPTHER

## 2021-06-01 RX ORDER — PANTOPRAZOLE SODIUM 40 MG/1
40 TABLET, DELAYED RELEASE ORAL EVERY MORNING
Status: DISCONTINUED | OUTPATIENT
Start: 2021-06-01 | End: 2021-06-04 | Stop reason: HOSPADM

## 2021-06-01 RX ORDER — BUDESONIDE 0.5 MG/2ML
0.5 INHALANT ORAL
Status: DISCONTINUED | OUTPATIENT
Start: 2021-06-01 | End: 2021-06-04 | Stop reason: HOSPADM

## 2021-06-01 RX ORDER — GUAIFENESIN/DEXTROMETHORPHAN 100-10MG/5
10 SYRUP ORAL EVERY 6 HOURS PRN
Status: DISCONTINUED | OUTPATIENT
Start: 2021-06-01 | End: 2021-06-04 | Stop reason: HOSPADM

## 2021-06-01 RX ORDER — MIDODRINE HYDROCHLORIDE 2.5 MG/1
5 TABLET ORAL
Status: DISCONTINUED | OUTPATIENT
Start: 2021-06-01 | End: 2021-06-04 | Stop reason: HOSPADM

## 2021-06-01 RX ORDER — BENZONATATE 100 MG/1
100 CAPSULE ORAL 3 TIMES DAILY PRN
Status: DISCONTINUED | OUTPATIENT
Start: 2021-06-01 | End: 2021-06-04 | Stop reason: HOSPADM

## 2021-06-01 RX ORDER — METOPROLOL TARTRATE 5 MG/5ML
INJECTION INTRAVENOUS
Status: COMPLETED
Start: 2021-06-01 | End: 2021-06-01

## 2021-06-01 RX ORDER — MONTELUKAST SODIUM 10 MG/1
10 TABLET ORAL DAILY
Status: DISCONTINUED | OUTPATIENT
Start: 2021-06-01 | End: 2021-06-04 | Stop reason: HOSPADM

## 2021-06-01 RX ORDER — AMLODIPINE BESYLATE 5 MG/1
5 TABLET ORAL DAILY
Status: DISCONTINUED | OUTPATIENT
Start: 2021-06-01 | End: 2021-06-04 | Stop reason: HOSPADM

## 2021-06-01 RX ORDER — DONEPEZIL HYDROCHLORIDE 5 MG/1
5 TABLET, FILM COATED ORAL DAILY
Status: DISCONTINUED | OUTPATIENT
Start: 2021-06-01 | End: 2021-06-04 | Stop reason: HOSPADM

## 2021-06-01 RX ORDER — FUROSEMIDE 20 MG/1
20 TABLET ORAL DAILY
Status: DISCONTINUED | OUTPATIENT
Start: 2021-06-01 | End: 2021-06-04 | Stop reason: HOSPADM

## 2021-06-01 RX ORDER — DILTIAZEM HYDROCHLORIDE 5 MG/ML
5 INJECTION INTRAVENOUS ONCE
Status: COMPLETED | OUTPATIENT
Start: 2021-06-01 | End: 2021-06-01

## 2021-06-01 RX ORDER — SOTALOL HYDROCHLORIDE 80 MG/1
80 TABLET ORAL EVERY 12 HOURS SCHEDULED
Status: DISCONTINUED | OUTPATIENT
Start: 2021-06-01 | End: 2021-06-04 | Stop reason: HOSPADM

## 2021-06-01 RX ADMIN — CEFTRIAXONE 1 G: 1 INJECTION, POWDER, FOR SOLUTION INTRAMUSCULAR; INTRAVENOUS at 21:52

## 2021-06-01 RX ADMIN — HEPARIN SODIUM 5000 UNITS: 5000 INJECTION INTRAVENOUS; SUBCUTANEOUS at 21:52

## 2021-06-01 RX ADMIN — DULOXETINE HYDROCHLORIDE 30 MG: 30 CAPSULE, DELAYED RELEASE ORAL at 09:01

## 2021-06-01 RX ADMIN — FUROSEMIDE 20 MG: 20 TABLET ORAL at 09:01

## 2021-06-01 RX ADMIN — METOPROLOL TARTRATE 5 MG: 5 INJECTION INTRAVENOUS at 01:47

## 2021-06-01 RX ADMIN — MONTELUKAST SODIUM 10 MG: 10 TABLET, COATED ORAL at 09:01

## 2021-06-01 RX ADMIN — SOTALOL HYDROCHLORIDE 80 MG: 80 TABLET ORAL at 21:52

## 2021-06-01 RX ADMIN — BENZONATATE 100 MG: 100 CAPSULE ORAL at 22:02

## 2021-06-01 RX ADMIN — METOPROLOL TARTRATE 5 MG: 5 INJECTION, SOLUTION INTRAVENOUS at 01:47

## 2021-06-01 RX ADMIN — INSULIN ASPART 6 UNITS: 100 INJECTION, SOLUTION INTRAVENOUS; SUBCUTANEOUS at 17:14

## 2021-06-01 RX ADMIN — INSULIN ASPART 7 UNITS: 100 INJECTION, SOLUTION INTRAVENOUS; SUBCUTANEOUS at 09:02

## 2021-06-01 RX ADMIN — DOCUSATE SODIUM 100 MG: 100 CAPSULE, LIQUID FILLED ORAL at 21:52

## 2021-06-01 RX ADMIN — POTASSIUM CHLORIDE 10 MEQ: 750 TABLET, FILM COATED, EXTENDED RELEASE ORAL at 09:01

## 2021-06-01 RX ADMIN — BENZONATATE 100 MG: 100 CAPSULE ORAL at 01:02

## 2021-06-01 RX ADMIN — PANTOPRAZOLE SODIUM 40 MG: 40 TABLET, DELAYED RELEASE ORAL at 05:02

## 2021-06-01 RX ADMIN — INSULIN DETEMIR 40 UNITS: 100 INJECTION, SOLUTION SUBCUTANEOUS at 09:03

## 2021-06-01 RX ADMIN — SODIUM CHLORIDE, PRESERVATIVE FREE 10 ML: 5 INJECTION INTRAVENOUS at 09:02

## 2021-06-01 RX ADMIN — AMLODIPINE BESYLATE 5 MG: 5 TABLET ORAL at 09:01

## 2021-06-01 RX ADMIN — DONEPEZIL HYDROCHLORIDE 5 MG: 5 TABLET, FILM COATED ORAL at 09:01

## 2021-06-01 RX ADMIN — SODIUM CHLORIDE, PRESERVATIVE FREE 10 ML: 5 INJECTION INTRAVENOUS at 21:52

## 2021-06-01 RX ADMIN — GUAIFENESIN AND DEXTROMETHORPHAN 10 ML: 100; 10 SYRUP ORAL at 05:25

## 2021-06-01 RX ADMIN — DILTIAZEM HYDROCHLORIDE 5 MG: 5 INJECTION INTRAVENOUS at 02:31

## 2021-06-01 RX ADMIN — SODIUM CHLORIDE 75 ML/HR: 9 INJECTION, SOLUTION INTRAVENOUS at 01:02

## 2021-06-01 RX ADMIN — LIDOCAINE 1 PATCH: 50 PATCH CUTANEOUS at 02:12

## 2021-06-01 RX ADMIN — METOPROLOL TARTRATE 5 MG: 1 INJECTION, SOLUTION INTRAVENOUS at 00:58

## 2021-06-01 RX ADMIN — DOCUSATE SODIUM 100 MG: 100 CAPSULE, LIQUID FILLED ORAL at 09:01

## 2021-06-01 RX ADMIN — HEPARIN SODIUM 5000 UNITS: 5000 INJECTION INTRAVENOUS; SUBCUTANEOUS at 09:01

## 2021-06-01 RX ADMIN — INSULIN ASPART 8 UNITS: 100 INJECTION, SOLUTION INTRAVENOUS; SUBCUTANEOUS at 11:27

## 2021-06-01 RX ADMIN — TIZANIDINE 2 MG: 4 TABLET ORAL at 02:13

## 2021-06-01 NOTE — NURSING NOTE
Jewelry removed for MRI. 3 rings, 1 necklace, 1 bracelet, a hair pin, and a pair of earrings. CHIRAG Mackey present. Jewelry left at bedside per pt request to put back on after test.

## 2021-06-01 NOTE — PROGRESS NOTES
Was notified by RN that patient was in atrial fibrillation with RVR with rates 120-130's. STAT EKG obtained confirming atrial fibrillation with RVR with . IV metoprolol 5 mg administered.     Update: Patient was still running atrial fibrillation 120's, so another 5 mg of IV metroprolol administered. Will continue to monitor.      Update: Patient continuing to run 120-130's, so IV cardizem 5 mg was administered.     Update: Patient converted to sinus rhythm HR 90's. EKG below to confirm.

## 2021-06-01 NOTE — PLAN OF CARE
Goal Outcome Evaluation:  Plan of Care Reviewed With: patient  Pt resting in bed with no complaints. Pt shows no s/s of distress. Will continue with plan of care.

## 2021-06-01 NOTE — CONSULTS
Patient Name:  Andreea Marshall  YOB: 1950  MRN: [id number]  Admit Date:  5/30/2021        Plans for nursing home placement where they will provide care and medication. If any questions or concerns please re-consult. Thank you      Electronically signed by:  Radha Rodriguez RN  06/01/21 08:29 EDT

## 2021-06-01 NOTE — PLAN OF CARE
Goal Outcome Evaluation:   Pt had complaints of coughing, notified Christo POLO, gave meds as ordered. Patient had been resting on and off throughout the shift, she converted to afib tonight and EKG confirmed, which she does have a history of afib. She was asymptotic. Christo POLO made aware, see orders. No other complaints or concerns tonight, no s/s of distress, will continue to monitor and follow plan of care.

## 2021-06-01 NOTE — CASE MANAGEMENT/SOCIAL WORK
Discharge Planning Assessment   Sky     Patient Name: Andreea Marshall  MRN: [id number]  Today's Date: 6/1/2021    Admit Date: 5/30/2021        Discharge Plan     Row Name 06/01/21 1142       Plan    Plan  SS spoke with pt's daughter Dulce who stated first preference would be [facility].  SS faxed pt information to [facility] for review and notified Mary.  SS will follow.        Continued Care and Services - Admitted Since 5/30/2021     Destination     Service Provider Request Status Selected Services Address Phone Fax Patient Preferred    [facility]  Pending - Request Sent N/A [address] [place] [address] [phone] --    [facility]  Pending - Request Sent N/A [address] [place] [address] [phone] --                SURI RodriguezW

## 2021-06-01 NOTE — DISCHARGE PLACEMENT REQUEST
"MarshallIvan shah (71 y.o. Female)     Date of Birth Social Security Number Address Home Phone MRN    1950  [address] [place] [address] [phone] [id number]    Confucianism Marital Status          Unknown        Admission Date Admission Type Admitting Provider Attending Provider Department, Room/Bed    5/30/21 Emergency Frankie Wood MD Mullins, Thomas Anthony, DO [address], 3319/1P    Discharge Date Discharge Disposition Discharge Destination                       Attending Provider: Rocky Méndez DO    Allergies: No Known Allergies    Isolation: None   Infection: None   Code Status: No CPR    Ht: 160 cm (63\")   Wt: 115 kg (253 lb 6.4 oz)    Admission Cmt: None   Principal Problem: UTI (urinary tract infection) [N39.0]                 Active Insurance as of 5/30/2021     Primary Coverage     Payor Plan Insurance Group Employer/Plan Group    MEDICARE MEDICARE A & B      Payor Plan Address Payor Plan Phone Number Payor Plan Fax Number Effective Dates    [address] [phone]  10/1/2001 - None Entered    [facility] [id number]       Subscriber Name Subscriber Birth Date Member ID       IVAN MARSHALL 1950 [id number]           Secondary Coverage     Payor Plan Insurance Group Employer/Plan Group    [facility] [id number]     Payor Plan Address Payor Plan Phone Number Payor Plan Fax Number Effective Dates    [address]   1/1/2021 - None Entered    [facility] [id number]       Subscriber Name Subscriber Birth Date Member ID       IVAN MARSHALL 1950 [id number]                 Emergency Contacts      (Rel.) Home Phone Work Phone Mobile Phone    JS CARLOS (Daughter) [phone] -- --            Emergency Contact Information     Name Relation Home Work Mobile    JS CARLOS Daughter [phone]            Insurance Information                MEDICARE/MEDICARE A & B Phone: [phone]    Subscriber: Ivan Marshall " Subscriber#: [id number]    Group#:  Precert#:         [employer] [facility]/[employer] [facility] Phone:     Subscriber: Andreea Marshall Subscriber#: [id number]    Group#: [id number] Precert#:           Treatment Team  Chat With All Active Members    Provider Relationship Specialty Contact    Rocky Méndez DO  Attending --  [phone]    Damaris Melgar PCT  Patient Care Technician --     Frankie Wood MD  Consulting Physician Hospitalist  [phone]    Rudy Umana, RN  Registered Nurse --  [phone]    Kiki Harris, RRT  Respiratory Therapist --  [phone]          Problem List         Codes Noted - Resolved       Hospital    * (Principal) UTI (urinary tract infection) ICD-10-CM: N39.0  ICD-9-CM: 599.0 5/30/2021 - Present       Non-Hospital    Dementia (CMS/HCC) (Chronic) ICD-10-CM: F03.90  ICD-9-CM: 294.20 5/31/2021 - Present    Paroxysmal atrial fibrillation (CMS/HCC) (Chronic) ICD-10-CM: I48.0  ICD-9-CM: 427.31 5/31/2021 - Present    COPD (chronic obstructive pulmonary disease) (CMS/HCC) (Chronic) ICD-10-CM: J44.9  ICD-9-CM: 496 5/31/2021 - Present    Osteoporosis with fracture (Chronic) ICD-10-CM: M80.80XA  ICD-9-CM: 733.10, 733.00 5/31/2021 - Present    Diverticulosis (Chronic) ICD-10-CM: K57.90  ICD-9-CM: 562.10 5/31/2021 - Present    GERD without esophagitis (Chronic) ICD-10-CM: K21.9  ICD-9-CM: 530.81 5/31/2021 - Present    Type 2 diabetes mellitus, with long-term current use of insulin (CMS/HCC) (Chronic) ICD-10-CM: E11.9, Z79.4  ICD-9-CM: 250.00, V58.67 5/31/2021 - Present             History & Physical      Kat Villafuerte PA-C at 05/30/21 2350     Attestation signed by Frankie Wood MD at 05/31/21 3167    I have seen and examined the patient independently from Kat Villafuerte PA-C, and have reviewed Kat's findings, assessment and plan in the H&P below. Patient and daughter at bedside reiterate recent history of frequent falls, some of which are associated with  dizziness and loss of balance. No urinary symptoms except foul smelling urine per daughter. Patient does technically meet sepsis criteria, felt to be most likely secondary to UTI for which rocephin has been ordered. Follow up urine and blood cultures. Underlying infection could certainly be contributing to dizziness and frequent falls. However, also has afib and unfortunately is not anticoagulated despite a high [id number] score because of developing vaginal bleeding while on anticoagulation in the past. Therefore at high risk of stroke. Nothing seen on CT head, but she had reported nystagmus with change in position in bed while checking orthostatic vitals so could have posterior circulation/cerebellar embolic stroke not seen on CT. Alternatively could have BPPV. Will evaluate further with MRI brain. Otherwise agree with pre-syncope workup listed below including monitor for arrhythmias on telemetry, trending troponin, and evaluating further with ECHO and carotid doppler in the morning. For diabetes, glucose glucose is currently 309. Patient takes lantus every morning. Will give some correctional novolog now and start SSI in the morning. Wlil start levemir in place of lantus once home meds are reconciled. Ultimately daughter is requesting SNF placement so PT and OT will evaluate and  will begin working on placement.                       [facility] Medicine Services  History & Physical    Patient Identification:  Name:  Andreea Marshall  Age:  71 y.o.  Sex:  female  :  1950  MRN:  [id number]   Visit Number:  [id number]  Primary Care Physician:  Provider, No Known    Subjective     2021   Chief complaint: Frequent falls    History of presenting illness:      Andreea Marshall is a 71 y.o. female with past medical history significant for dementia, essential hypertension, paroxysmal atrial fibrillation, COPD, osteoporosis, diverticulosis, GERD, and insulin-dependent type II  diabetes mellitus.    Patient presents to [facility] Emergency Department due to more frequent falls. Patient's daughter, Dulce Horta, is present at bedside to assist in providing the history and physical. She reports that her mother has been living with her for about a year and a half and is requiring total care with her activities of daily living. She states that her mother does have a history of falls over the past few years, but has been falling more frequently over the past few months. She states that she has fallen a total of six times over the past two months. She reports that sometimes it is due to her being very unsteady on her feet and sometimes it is due to her getting dizzy. She has a walker, cane, and wheelchair that she uses at home, but only uses these devices as needed. She states that today is the worst fall that her mother has sustained. The patient states that she was ambulating to the bathroom and started to experience some dizziness and fell. She did sustain injuries to her right leg including her knee and ankle and has a small abrasion just under her nose. She denies having a syncopal episode or experiencing chest pain prior to her fall. She reports some shortness of breath, but no worse than usual due to her underlying COPD. She has had a cough that is nonproductive that has worsened over the past few days. She also reports a headache. Denies nasal drainage, sore throat, abdominal pain, nausea, vomiting, or diarrhea. Patient is alert and oriented to person, time, date of birth, and who the president is. She is disoriented to place. The daughter reports this is her baseline orientation status. Daughter states that she is having a hard time caring for her mother as she has a full-time job and her mother is growing weaker and requiring assistance. I did discuss with her the possibility of placement at a skilled nursing facility and she is agreeable to this.     Upon arrival to the ED,  vital signs were temperaure 98.1, heart rate 85, respirations 14, /82, SpO2 94% on room air. ABG shows pH 7.487, pCO2 40.6, pO2 69.2, HCO3 30.6 with oxygen saturation of 95 on room air. CMP shows glucose 309, chloride 93, anion gap 17, creatinine 1.07, eGFR 50, alkaline phosphatase 133, otherwise within normal limits. CBC shows WBC 19.10, RBC 5.74, hemoglobin 17, hematocrit 52.5, otherwise within normal limits. UA shows trace ketones, positive nitrites, 1+ leukocytes, 2+ protein, 6-12 RBC, too numerous to count WBC, 4+ bacteria, and 3-6 squamous epithelial cells. C-RP 1.3. Lactate 1.9. Negative acetone. Pending blood cultures x 2 and urine culture. X-ray right shoulder shows slightly flattening appearance of the humeral head which may be chronic in nature and somewhat due to projectional artifact but fracture cannot be excluded and no dislocation. X-ray right knee shows moderate tricompartmental arthrosis with small joint effusion without fracture dislocation. X-ray of right ankle shows no acute fracture dislocation. CT head shows no acute intracranial abnormality. CT cervical spine shows no acute cervical spine injury with multilevel degenerative changes. CT lumbar spine shows no acute injury with multilevel degenerative changes. CT right upper extremity shows no acute fracture dislocation of the right shoulder; chronic flattening of the right humeral head likely secondary to remote posttraumatic deformity with mild glenohumeral arthrosis; moderate AC joint arthrosis. CT abdomen pelvis shows no renal or ureteral stones and no hydronephrosis; moderate stool burden and mild small bowel ileus suspected without obstruction.    Known Emergency Department medications received prior to my evaluation included IV Rocephin 1 g. Emergency Department Room location at the time of my evaluation was 408.     ---------------------------------------------------------------------------------------------------------------------    Review of Systems   Constitutional: Positive for activity change (worsening debility). Negative for chills, fatigue and fever.   HENT: Negative for congestion, postnasal drip, rhinorrhea, sneezing and sore throat.    Eyes: Negative for discharge and redness.   Respiratory: Positive for cough (nonproductive) and shortness of breath (at baseline). Negative for apnea and wheezing.    Cardiovascular: Positive for leg swelling (chronic;baseline). Negative for chest pain and palpitations.   Gastrointestinal: Negative for abdominal distention, abdominal pain, diarrhea, nausea and vomiting.   Endocrine: Negative for polydipsia, polyphagia and polyuria.   Genitourinary: Negative for difficulty urinating, dysuria, frequency, hematuria and urgency.        Reports foul-smelling urine   Musculoskeletal: Positive for arthralgias (right ankle/knee) and gait problem. Negative for myalgias.   Skin: Negative for color change, pallor and wound.   Neurological: Positive for dizziness, weakness and headaches. Negative for seizures and syncope.   Psychiatric/Behavioral: Negative for agitation and behavioral problems. The patient is not nervous/anxious.         ---------------------------------------------------------------------------------------------------------------------   Past Medical History:   Diagnosis Date   • COPD (chronic obstructive pulmonary disease) (CMS/[facility])    • Dementia (CMS/[facility])    • Diverticulosis    • Essential hypertension    • GERD (gastroesophageal reflux disease)    • Osteoporosis    • Paroxysmal atrial fibrillation (CMS/[facility])    • Type II diabetes mellitus (CMS/[facility])      Past Surgical History:   Procedure Laterality Date   • APPENDECTOMY     • CHOLECYSTECTOMY     • TONSILLECTOMY       Family History   Problem Relation Age of Onset   • Cerebral aneurysm Mother    • Heart failure Father    • Diabetes Father      Social History     Socioeconomic History   • Marital status:      Spouse name: Not on file   •  Number of children: Not on file   • Years of education: Not on file   • Highest education level: Not on file   Tobacco Use   • Smoking status: Never Smoker   • Smokeless tobacco: Never Used   Substance and Sexual Activity   • Alcohol use: Never   • Drug use: Never   • Sexual activity: Defer     ---------------------------------------------------------------------------------------------------------------------   Allergies:  Patient has no known allergies.  ---------------------------------------------------------------------------------------------------------------------   Home medications:    Medications below are reported home medications pulling from within the system; at this time, these medications have not been reconciled unless otherwise specified and are in the verification process for further verifcation as current home medications.  Medications Prior to Admission   Medication Sig Dispense Refill Last Dose   • budesonide-formoterol (SYMBICORT) 80-4.5 MCG/ACT inhaler Inhale 2 puffs 2 (Two) Times a Day.   5/30/2021 at Unknown time   • donepezil (ARICEPT) 5 MG tablet Take 5 mg by mouth Daily.   5/30/2021 at Unknown time   • DULoxetine (CYMBALTA) 30 MG capsule Take 30 mg by mouth Daily.   5/30/2021 at Unknown time   • esomeprazole (nexIUM) 40 MG capsule Take 40 mg by mouth Every Morning Before Breakfast.   5/30/2021 at Unknown time   • ezetimibe 10 MG tablet 10 mg, simvastatin 40 MG tablet 40 mg Take 1 tablet by mouth Daily.   5/30/2021 at Unknown time   • furosemide (LASIX) 10 MG half tablet Take 10 mg by mouth Daily.   5/30/2021 at Unknown time   • insulin glargine (LANTUS, SEMGLEE) 100 UNIT/ML injection Inject 80 Units under the skin into the appropriate area as directed Daily.   5/30/2021 at Unknown time   • losartan (COZAAR) 50 MG tablet Take 50 mg by mouth Daily.   5/30/2021 at Unknown time   • montelukast (SINGULAIR) 10 MG tablet Take 10 mg by mouth Daily.   5/30/2021 at Unknown time   • potassium  chloride 10 MEQ CR tablet Take 10 mEq by mouth Daily.   5/30/2021 at Unknown time   • tiZANidine (ZANAFLEX) 2 MG half tablet Take 2 mg by mouth Daily.   5/30/2021 at Unknown time   • traZODone (DESYREL) 100 MG tablet Take 100 mg by mouth Every Night.   5/29/2021 at Unknown time   • triamterene-hydrochlorothiazide (MAXZIDE-25) 37.5-25 MG per tablet Take 1 tablet by mouth Daily.   5/30/2021 at Unknown time   • albuterol sulfate  (90 Base) MCG/ACT inhaler Inhale 2 puffs Every 6 (Six) Hours As Needed for Wheezing.   Unknown at Unknown time   • benzonatate (TESSALON) 100 MG capsule Take 100 mg by mouth 3 (Three) Times a Day As Needed for Cough.   Unknown at Unknown time       Hospital Scheduled Meds:  cefTRIAXone, 1 g, Intravenous, Q24H  docusate sodium, 100 mg, Oral, BID  heparin (porcine), 5,000 Units, Subcutaneous, Q12H  insulin aspart, 0-9 Units, Subcutaneous, TID AC  sodium chloride, 10 mL, Intravenous, Q12H      sodium chloride, 75 mL/hr, Last Rate: 75 mL/hr (05/31/21 0056)        Current listed hospital scheduled medications may not yet reflect those currently placed in orders that are signed and held awaiting patient's arrival to floor.   ---------------------------------------------------------------------------------------------------------------------     Objective     Vital Signs:  Temp:  [98.1 °F (36.7 °C)-99.2 °F (37.3 °C)] 99.2 °F (37.3 °C)  Heart Rate:  [] 102  Resp:  [14-18] 18  BP: (144-181)/() 163/69      05/30/21  1624 05/31/21  0023   Weight: 104 kg (230 lb) 117 kg (257 lb)     Body mass index is 45.53 kg/m².  ---------------------------------------------------------------------------------------------------------------------       Physical Exam  Constitutional:       Appearance: Normal appearance. She is obese.      Comments: Patient is resting in bed at the time of my examination. She is in no acute distress.    HENT:      Head: Normocephalic and atraumatic.      Right Ear:  External ear normal.      Left Ear: External ear normal.      Nose: Nose normal.      Mouth/Throat:      Mouth: Mucous membranes are moist.      Pharynx: Oropharynx is clear.   Eyes:      Extraocular Movements: Extraocular movements intact.      Conjunctiva/sclera: Conjunctivae normal.      Pupils: Pupils are equal, round, and reactive to light.   Cardiovascular:      Rate and Rhythm: Normal rate and regular rhythm.      Pulses: Normal pulses.      Heart sounds: Normal heart sounds. No murmur heard.   No friction rub. No gallop.    Pulmonary:      Effort: Pulmonary effort is normal. No respiratory distress.      Breath sounds: Normal breath sounds. No wheezing, rhonchi or rales.   Abdominal:      General: Abdomen is flat. Bowel sounds are normal. There is no distension.      Palpations: Abdomen is soft.      Tenderness: There is no abdominal tenderness. There is no guarding.   Musculoskeletal:         General: Swelling present. Normal range of motion.      Cervical back: Normal range of motion and neck supple.      Right lower leg: Edema present.      Left lower leg: Edema present.        Legs:    Skin:     General: Skin is warm and dry.      Findings: No erythema or rash.          Neurological:      General: No focal deficit present.      Mental Status: She is alert. Mental status is at baseline. She is disoriented.      Comments: Patient is alert and oriented to person, time, date of birth, and president. She is disoriented to place. Daughter reports this is her baseline orientation. No focal deficits appreciated. Facial features symmetrical. No tongue deviation.  strength 5/5 bilaterally. No upper extremity drift. Lower extremity strength 5/5 bilaterally.    Psychiatric:         Mood and Affect: Mood normal.         Behavior: Behavior normal.         Thought Content: Thought content normal.          ---------------------------------------------------------------------------------------------------------------------  EKG:    Will obtain baseline EKG.         Telemetry:    Telemetry shows normal sinus rhythm with HR 95 and SpO2 95% on room air.     I have personally looked at both the EKG and the telemetry strips.  ---------------------------------------------------------------------------------------------------------------------   Results from last 7 days   Lab Units 05/30/21 2230 05/30/21 2137   CRP mg/dL  --  1.30*   LACTATE mmol/L 1.9  --    WBC 10*3/mm3  --  19.10*   HEMOGLOBIN g/dL  --  17.0*   HEMATOCRIT %  --  52.5*   MCV fL  --  91.5   MCHC g/dL  --  32.4   PLATELETS 10*3/mm3  --  358     Results from last 7 days   Lab Units 05/30/21  2303   PH, ARTERIAL pH units 7.487*   PO2 ART mm Hg 69.2*   PCO2, ARTERIAL mm Hg 40.6   HCO3 ART mmol/L 30.6*     Results from last 7 days   Lab Units 05/30/21 2137   SODIUM mmol/L 136   POTASSIUM mmol/L 3.8   CHLORIDE mmol/L 93*   CO2 mmol/L 26.0   BUN mg/dL 13   CREATININE mg/dL 1.07*   EGFR IF NONAFRICN AM mL/min/1.73 50*   CALCIUM mg/dL 10.1   GLUCOSE mg/dL 309*   ALBUMIN g/dL 4.17   BILIRUBIN mg/dL 1.0   ALK PHOS U/L 133*   AST (SGOT) U/L 17   ALT (SGPT) U/L 17   Estimated Creatinine Clearance: 57.8 mL/min (A) (by C-G formula based on SCr of 1.07 mg/dL (H)).  No results found for: AMMONIA          No results found for: HGBA1C  No results found for: TSH, FREET4  No results found for: PREGTESTUR, PREGSERUM, HCG, HCGQUANT  Pain Management Panel    There is no flowsheet data to display.           ---------------------------------------------------------------------------------------------------------------------  Imaging Results (Last 7 Days)     Procedure Component Value Units Date/Time    CT Abdomen Pelvis Stone Protocol [[id number]] Collected: 05/30/21 2343     Updated: 05/30/21 2345    Narrative:      CT Abdomen Pelvis WO    INDICATION:   Flank pain. Urinary  tract infection. Fall today.    TECHNIQUE:   CT of the abdomen and pelvis without IV contrast. Coronal and sagittal reconstructions were obtained.  Radiation dose reduction techniques included automated exposure control or exposure modulation based on body size. Count of known CT and cardiac nuc  med studies performed in previous 12 months: 4.     COMPARISON:   None available.    FINDINGS:  There is some motion degradation. There is mild scarring/atelectasis in the lung bases. There is atherosclerotic disease with no aortic aneurysm. Gallbladder is surgically absent. No biliary obstruction is seen. 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 No renal or ureteral stones are identified, and there is no hydronephrosis. Unenhanced solid abdominal organs are otherwise unremarkable. Urinary bladder is normal. Solid pelvic organs are normal.    Moderate stool burden in the colon may indicate constipation. There is no evidence of acute colitis. The appendix is not clearly identified, but there is no evidence of acute appendicitis. There is some gas in small bowel loops suggesting a mild ileus.  There is a diverticulum in the third portion of the duodenum. No free fluid or adenopathy is identified. No acute fractures are seen.      Impression:        1. No renal or ureteral stones. No hydronephrosis.  2. Moderate stool burden in the colon may indicate constipation.  3. Mild small bowel ileus suspected. No bowel obstruction.  4. Cholecystectomy without biliary obstruction.      Signer Name: Deven Rios MD   Signed: 5/30/2021 11:43 PM   Workstation Name: [email or web address]    [facility]    CT Head Without Contrast [[id number]] Collected: 05/30/21 2302     Updated: 05/30/21 2304    Narrative:      CT Head WO    HISTORY:   Generalized weakness today. Fall today.    TECHNIQUE:   Routine noncontrast head CT. Coronal and sagittal reformatted images. Radiation dose reduction  techniques included automated exposure control or exposure modulation based on body size. Count of known CT and cardiac nuc med studies performed in previous  12 months: 0.     COMPARISON:   None.    FINDINGS:   No hemorrhage, acute infarction, mass lesion, or abnormal extra-axial fluid collection. No midline shift or focal mass effect. Ventricular system is normal in size and configuration. Moderate generalized atrophy and chronic small vessel disease  throughout the supratentorial white matter. No acute osseous abnormality. Visualized paranasal sinuses are clear. Visualized mastoid air cells are clear.      Impression:      No acute intracranial abnormality. Age-related senescent changes.          Signer Name: Venu Roque MD   Signed: 5/30/2021 11:02 PM   Workstation Name: [id number]    [facility]    CT Upper Extremity Right Without Contrast [[id number]] Collected: 05/30/21 1947     Updated: 05/30/21 1949    Narrative:      CT UE RT WO    INDICATION:    Right shoulder pain status post fall today.    TECHNIQUE:   CT of the right shoulder without IV contrast. Coronal and sagittal reconstructions were obtained.  Radiation dose reduction techniques included automated exposure control or exposure modulation based on body size. Count of known CT and cardiac nuc med  studies performed in previous 12 months: 0.     COMPARISON:    Right shoulder x-rays performed the same date.    FINDINGS:  There is no evidence of acute fracture or dislocation of the right shoulder. There is chronic flattening of the right humeral head, likely secondary to remote posttraumatic deformity. Mild degenerative change of the glenohumeral joint. Moderate  degenerative change of the acromioclavicular joint. No glenohumeral effusion. Periarticular soft tissues are unremarkable. Included lung fields are clear.      Impression:        1. No acute fracture or dislocation of the right shoulder.  2. Chronic flattening of the  right humeral head, likely secondary to remote posttraumatic deformity. Mild glenohumeral arthrosis.  3. Moderate AC joint arthrosis.    Signer Name: Venu Roque MD   Signed: 5/30/2021 7:47 PM   Workstation Name: [facility]    [facility]    XR Ankle 3+ View Right [[id number]] Collected: 05/30/21 1812     Updated: 05/30/21 1814    Narrative:      CR Ankle Min 3 Vws RT    INDICATION:   Right ankle pain status post fall today.    COMPARISON:   None.    FINDINGS:  3 view(s) of the right ankle.  No fracture or dislocation. Prominent plantar calcaneal spur. Ossification at the Achilles tendon insertion. Mild degenerative changes throughout the midfoot. No bone erosion or destruction. No foreign body.      Impression:      No acute fracture or dislocation.    Signer Name: Venu Roque MD   Signed: 5/30/2021 6:12 PM   Workstation Name: [facility]    Radiology [facility]    XR Knee 4+ View Right [[id number]] Collected: 05/30/21 1810     Updated: 05/30/21 1812    Narrative:      CR Knee Min 4 Vws RT    INDICATION:   Anterior right knee pain status post fall today.    COMPARISON:   None available.    FINDINGS:  4 view(s) of the right knee.  No acute fracture or dislocation. Small joint effusion. Moderate degenerative changes in all 3 compartments. No bone erosion or destruction.  No foreign body.      Impression:      Moderate tricompartmental arthrosis. Small joint effusion. No acute fracture or dislocation.    Signer Name: Venu Roque MD   Signed: 5/30/2021 6:10 PM   Workstation Name: [facility]    [facility]    XR Shoulder 2+ View Right [[id number]] Collected: 05/30/21 1809     Updated: 05/30/21 1811    Narrative:      CR Shoulder Comp Min 2 Vws RT    INDICATION:   Right shoulder pain status post fall today.    COMPARISON:   None available.    FINDINGS:   3 views of the right shoulder.  Slightly flattened appearance of the humeral head of  indeterminate age. This may also be slightly projectional. However, a humeral head fracture cannot be excluded. No glenohumeral dislocation. Moderate degenerative  changes of the acromioclavicular joint.  No foreign body.      Impression:      Slightly flattened appearance of the humeral head, indeterminate age. This may be chronic in nature and somewhat due to projectional artifact. However, a humeral head fracture cannot be excluded on the exam. No glenohumeral dislocation. CT of the right  shoulder may be considered if further characterization is warranted.    Signer Name: Venu Rqoue MD   Signed: 5/30/2021 6:09 PM   Workstation Name: [id number]    Radiology [facility]    CT Lumbar Spine Without Contrast [[id number]] Collected: 05/30/21 1725     Updated: 05/30/21 1728    Narrative:      CT Spine Lumbar WO    INDICATION:    Back pain status post fall today.    TECHNIQUE:   CT of the lumbar spine without IV contrast. Coronal and sagittal reconstructions were obtained.  Radiation dose reduction techniques included automated exposure control or exposure modulation based on body size. Count of known CT and cardiac nuc med  studies performed in previous 12 months: 0.     COMPARISON:    None available.    FINDINGS:  No acute fractures. Lumbar vertebral body heights and alignment are adequately maintained. Mild to moderate multilevel discogenic degenerative changes, most prominent at L2-3. Moderate multilevel facet degeneration. Mild central canal stenosis at  multiple levels. Paravertebral soft tissues are unremarkable. Included sacrum and SI joints are intact.      Impression:        1. No acute lumbar spine injury.  2. Multilevel degenerative changes, detailed above.    Signer Name: Venu Roque MD   Signed: 5/30/2021 5:25 PM   Workstation Name: [email or web address]    [facility]    CT Cervical Spine Without Contrast [[id number]] Collected: 05/30/21 1710     Updated: 05/30/21 1712     Narrative:      CT Spine Cervical WO    INDICATION:   Neck pain status post fall today.    TECHNIQUE:   CT of the cervical spine without IV contrast. Coronal and sagittal reconstructions were obtained.  Radiation dose reduction techniques included automated exposure control or exposure modulation based on body size. Count of known CT and cardiac nuc med  studies performed in previous 12 months: 0.     COMPARISON:  None available.    FINDINGS:  No acute fracture. Vertebral body heights and alignment are normally maintained. Atlantoaxial relationship and cervicothoracic junction are within normal limits. Mild to moderate multilevel degenerative disc changes and facet arthropathy. Moderate  central canal stenosis at C5-6. Paravertebral soft tissues are unremarkable. Included lung fields are clear.      Impression:        1. No acute cervical spine injury.  2. Multilevel degenerative changes, detailed above.    Signer Name: Venu Roque MD   Signed: 5/30/2021 5:10 PM   Workstation Name: [email or web address]    [facility]        I have personally reviewed the above radiology images and read the final radiology report on 05/31/21  ---------------------------------------------------------------------------------------------------------------------  Assessment / Plan     Active Hospital Problems    Diagnosis  POA   • **UTI (urinary tract infection) [N39.0]  Yes       ASSESSMENT/PLAN:  Sepsis present on admission secondary to acute UTI (HR >90, WBC 19.10)  - UA shows 3+ glucose, trace ketones, positive nitrites, 1+ leukocytes, 2+ protein, 6-12 RBC, too numerous to count WBC, 4+ bacteria, and 3-6 squamous epithelium.   - CXR ordered.    - Pending urine culture and blood cultures x 2.   - Received 1 g IV Rocephin in ED; will continue IV Rocephin on admission while awaiting cultures.   - IV NS @ 75 mL/hr.   - Continuous cardiac monitoring ordered.   - Repeat AM CBC.     Frequent Falls associated with  dizziness and worsening debility, present on admission   - Daughter reports that patient has fell a total of 6 times over the past 2 months and sometimes due to dizziness and also unsteady gait.   - Imaging done in ED did not reveal any acute fracture or dislocation.  - CT head shows no acute abnormality.   - Obtain AM transthoracic echocardiogram.  - US bilateral carotids ordered.    - Orthostatic vitals ordered q shift; initial orthostatics negative.   - Continuous cardiac monitoring ordered.   - PT/OT consulted.     Presumed Acute on CKD, unknown baseline  - Creatinine 1.07; daughter was unsure of baseline and no previous labs available for review.   - CT abdomen/pelvis shows no obstructive uropathy.   - IV NS @ 75 mL/hr ordered.   - Repeat AM CMP.   - Monitor intake/output.     Mild Small Bowel Ileus  - CT abdomen/pelvis shows moderate stool burden and mild small bowel ileus.   - No complaints of abdominal pain or tenderness or nausea and vomiting.   - Bowel regimen ordered with daily colace and PRN miralax; if no BM with bowel regimen then suppository ordered.     Elevated Alkaline Phosphatase   - Alkaline phosphatase 133.   - Repeat AM CMP.     Elevated Anion-gap   - AG 17; UA showed ketones; negative acetone.   - Repeat AM CMP.     Insulin-dependent Type II Diabetes Mellitus with Hyperglycemia  - Glucose 309.   - Obtain hemoglobin A1c.   - Accuchecks qAC and qHS ordered.   - Moderate-dose SSI ordered; titrate as necessary.  - Hypoglycemic protocol in place as needed.     Paroxysmal Atrial Fibrillation   - [id number] score approximately 4 with age, gender and history of HTN and DM; takes daily aspirin, but no on anticoagulation as patient had developed postmenopausal bleeding while on anticoagulation so this was stopped.   - EKG ordered.   - Continuous cardiac monitoring ordered.     Essential Hypertension  - BP is slightly elevated.   - Continue to monitor per hospital protocol.   - Will review home  medications once reconciled by pharmacy.     COPD, without acute exacerbation  - ABG shows mild alkalosis; otherwise unremarkable.   - Supplemental oxygen ordered; titrate to maintain SpO2 90-95%.   - Continuous pulse oximetry ordered.     Osteoporosis  - Supportive care.   - PT/OT consulted.     GERD  - Review H2 blocker/PPI regimen once reconciled by pharmacy.   ----------  -DVT prophylaxis: SQ Heparin   -Activity: Up with assistance; fall precautions  -Expected length of stay:   INPATIENT status due to the need for care which can only be reasonably provided in an hospital setting such as aggressive/expedited ancillary services and/or consultation services, the necessity for IV medications, close physician monitoring and/or the possible need for procedures.  In such, I feel patient's risk for adverse outcomes and need for care warrant INPATIENT evaluation and predict the patient's care encounter to likely last beyond 2 midnights.    High risk secondary to sepsis secondary to acute UTI and frequent falls associated with dizziness and worsening debility     Disposition: Daughter and POA, Dulce Horta, is requesting that patient be placed in SNF upon discharge.  has been consulted.     CODE Status: DNR/DNI. Discussed with daughter and MÓNICAJESSICA.    Kat Villafuerte PA-C  05/31/21  01:14 EDT    ---------------------------------------------------------------------------------------------------------------------           Electronically signed by Frankie Wood MD at 05/31/21 0137       Vital Signs (last day)     Date/Time   Temp   Temp src   Pulse   Resp   BP   Patient Position   SpO2    06/01/21 1046   98.2 (36.8)   Oral   76   20   145/73   Lying   95    06/01/21 0901   --   --   80   --   132/57   --   --    06/01/21 0711   --   --   --   --   --   --   90    06/01/21 0646   98 (36.7)   Oral   78   20   132/67   Lying   97    06/01/21 0502   --   --   100   --   --   --   --    06/01/21 0424    --   --   (!) 124   --   134/81   Lying   --    06/01/21 0338   --   --   118   --   144/84   Lying   --    06/01/21 0324   --   --   (!) 133   18   (!) 83/56   Standing   96    06/01/21 0319   --   --   120   18   (!) 151/112   Sitting   94    06/01/21 0314   98.2 (36.8)   Oral   119   18   136/88   Lying   90    06/01/21 0231   --   --   (!) 129   --   130/80   --   --    06/01/21 0213   --   --   (!) 125   --   112/63   Lying   --    06/01/21 0133   --   --   (!) 127   --   (!) 163/105   Lying   --    06/01/21 0058   --   --   (!) 135   --   (!) 192/86   --   --    06/01/21 0051   --   --   (!) 133   --   (!) 182/98   Lying   --    05/31/21 1908   98.6 (37)   Oral   95   18   147/65   Lying   93    05/31/21 1412   98.2 (36.8)   Oral   97   18   146/60   Lying   96    05/31/21 1410   98.2 (36.8)   Oral   97   18   152/62   Sitting   96    05/31/21 0615   98 (36.7)   Oral   90   18   150/75   Lying   95    05/31/21 0229   99.2 (37.3)   Oral   92   16   152/70   Lying   95    05/31/21 0117   --   --   --   --   --   --   96    05/31/21 0100   --   --   102   18   163/69   Lying   --    05/31/21 0050   --   --   102   --   (!) 168/139   Standing   --    05/31/21 0045   --   --   100   --   157/90   Sitting   --    05/31/21 0040   --   --   98   --   149/74   Lying   --    05/31/21 0023   99.2 (37.3)   Oral   93   18   (!) 181/82   Lying   95    05/31/21 00:00:43   --   --   95   18   163/84   Lying   93              Lines, Drains & Airways    Active LDAs     Name:   Placement date:   Placement time:   Site:   Days:    Peripheral IV 05/30/21 2309 Right Antecubital   05/30/21 2309    Antecubital   1    External Urinary Catheter   05/31/21    0547    --   1                  Current Facility-Administered Medications   Medication Dose Route Frequency Provider Last Rate Last Admin   • acetaminophen (TYLENOL) tablet 650 mg  650 mg Oral Q6H PRN Kat Villafuerte PA-C   650 mg at 05/31/21 0237   • amLODIPine (NORVASC)  tablet 5 mg  5 mg Oral Daily Rhoda Win DO   5 mg at 06/01/21 0901   • benzonatate (TESSALON) capsule 100 mg  100 mg Oral TID PRN Rhoda Win DO       • bisacodyl (DULCOLAX) suppository 10 mg  10 mg Rectal Once PRN Frankie Wood MD       • budesonide (PULMICORT) nebulizer solution 0.5 mg  0.5 mg Nebulization BID - RT Rhoda Win DO       • cefTRIAXone (ROCEPHIN) 1 g/100 mL 0.9% NS (MBP)  1 g Intravenous Q24H Frankie Wood MD   1 g at 05/31/21 2033   • dextrose (D50W) 25 g/ 50mL Intravenous Solution 25 g  25 g Intravenous Q15 Min PRN Frankie Wood MD       • dextrose (D50W) 25 g/ 50mL Intravenous Solution 25 g  25 g Intravenous Q15 Min PRN Frankie Wood MD       • dextrose (GLUTOSE) oral gel 15 g  15 g Oral Q15 Min PRN Frankie Wood MD       • dextrose (GLUTOSE) oral gel 15 g  15 g Oral Q15 Min PRN Frankie Wood MD       • docusate sodium (COLACE) capsule 100 mg  100 mg Oral BID Frankie Wood MD   100 mg at 06/01/21 0901   • donepezil (ARICEPT) tablet 5 mg  5 mg Oral Daily Rhoda Win DO   5 mg at 06/01/21 0901   • DULoxetine (CYMBALTA) DR capsule 30 mg  30 mg Oral Daily Rhoda Win DO   30 mg at 06/01/21 0901   • furosemide (LASIX) tablet 20 mg  20 mg Oral Daily Rhoda Win DO   20 mg at 06/01/21 0901   • glucagon (human recombinant) (GLUCAGEN DIAGNOSTIC) injection 1 mg  1 mg Subcutaneous Q15 Min PRN Frankie Wood MD       • glucagon (human recombinant) (GLUCAGEN DIAGNOSTIC) injection 1 mg  1 mg Subcutaneous Q15 Min PRN Frankie Wood MD       • guaiFENesin-dextromethorphan (ROBITUSSIN DM) 100-10 MG/5ML syrup 10 mL  10 mL Oral Q6H PRN Kat Villafuerte PA-C   10 mL at 06/01/21 0525   • heparin (porcine) 5000 UNIT/ML injection 5,000 Units  5,000 Units Subcutaneous Q12H Frankie Wood MD   5,000 Units at 06/01/21 0901   • insulin aspart (novoLOG) injection 0-9 Units   0-9 Units Subcutaneous TID AC Frankie Wood MD   8 Units at 06/01/21 1127   • insulin detemir (LEVEMIR) injection 40 Units  40 Units Subcutaneous Daily Frankie Wood MD   40 Units at 06/01/21 0903   • lidocaine (LIDODERM) 5 % 1 patch  1 patch Transdermal Q24H Rhoda Win DO   1 patch at 06/01/21 0212   • montelukast (SINGULAIR) tablet 10 mg  10 mg Oral Daily Rhoda Win DO   10 mg at 06/01/21 0901   • pantoprazole (PROTONIX) EC tablet 40 mg  40 mg Oral QAM Rhoda Win DO   40 mg at 06/01/21 0502   • polyethylene glycol (MIRALAX) packet 17 g  17 g Oral BID PRN Frankie Wood MD       • potassium chloride (K-DUR,KLOR-CON) ER tablet 10 mEq  10 mEq Oral Daily Rhoda Win DO   10 mEq at 06/01/21 0901   • sodium chloride 0.9 % flush 10 mL  10 mL Intravenous PRN Frankie Wood MD       • sodium chloride 0.9 % flush 10 mL  10 mL Intravenous Q12H Frankie Wood MD   10 mL at 06/01/21 0902   • sodium chloride 0.9 % flush 10 mL  10 mL Intravenous PRN Frankie Wood MD       • sodium chloride 0.9 % infusion  75 mL/hr Intravenous Continuous Frankie Wood MD 75 mL/hr at 06/01/21 0102 75 mL/hr at 06/01/21 0102   • tiZANidine (ZANAFLEX) tablet 2 mg  2 mg Oral Q12H PRN Rhoda Win DO   2 mg at 06/01/21 0213       Lab Results (last 24 hours)     Procedure Component Value Units Date/Time    POC Glucose Once [[id number]]  (Abnormal) Collected: 06/01/21 1100    Specimen: Blood Updated: 06/01/21 1108     Glucose 376 mg/dL     Urine Culture - Urine, Urine, Clean Catch [[id number]]  (Abnormal)  (Susceptibility) Collected: 05/30/21 2134    Specimen: Urine, Clean Catch Updated: 06/01/21 0940     Urine Culture >100,000 CFU/mL Escherichia coli    Susceptibility      Escherichia coli      CARLOS      Ampicillin Susceptible      Ampicillin + Sulbactam Susceptible      Cefazolin Susceptible      Cefepime Susceptible      Ceftazidime Susceptible       Ceftriaxone Susceptible      Gentamicin Susceptible      Levofloxacin Susceptible      Nitrofurantoin Susceptible      Piperacillin + Tazobactam Susceptible      Tetracycline Susceptible      Trimethoprim + Sulfamethoxazole Susceptible               Linear View                   POC Glucose Once [[id number]]  (Abnormal) Collected: 06/01/21 0646    Specimen: Blood Updated: 06/01/21 0652     Glucose 309 mg/dL     Blood Culture - Blood, Arm, Right [[id number]] Collected: 05/30/21 2228    Specimen: Blood from Arm, Right Updated: 05/31/21 2245     Blood Culture No growth at 24 hours    Blood Culture - Blood, Arm, Left [[id number]] Collected: 05/30/21 2228    Specimen: Blood from Arm, Left Updated: 05/31/21 2245     Blood Culture No growth at 24 hours    POC Glucose Once [[id number]]  (Abnormal) Collected: 05/31/21 1956    Specimen: Blood Updated: 05/31/21 2003     Glucose 326 mg/dL     POC Glucose Once [[id number]]  (Abnormal) Collected: 05/31/21 1606    Specimen: Blood Updated: 05/31/21 1612     Glucose 283 mg/dL         Orders (last 24 hrs)      Start     Ordered    06/01/21 1109  POC Glucose Once  Once      06/01/21 1100    06/01/21 0930  budesonide (PULMICORT) nebulizer solution 0.5 mg  2 Times Daily - RT      06/01/21 0143    06/01/21 0900  amLODIPine (NORVASC) tablet 5 mg  Daily      06/01/21 0143    06/01/21 0900  donepezil (ARICEPT) tablet 5 mg  Daily      06/01/21 0143    06/01/21 0900  DULoxetine (CYMBALTA) DR capsule 30 mg  Daily      06/01/21 0143    06/01/21 0900  furosemide (LASIX) tablet 20 mg  Daily      06/01/21 0143    06/01/21 0900  montelukast (SINGULAIR) tablet 10 mg  Daily      06/01/21 0143    06/01/21 0900  potassium chloride (K-DUR,KLOR-CON) ER tablet 10 mEq  Daily      06/01/21 0143    06/01/21 0700  pantoprazole (PROTONIX) EC tablet 40 mg  Every Morning      06/01/21 0143    06/01/21 0653  POC Glucose Once  Once      06/01/21 0646    06/01/21 0507  guaiFENesin-dextromethorphan (ROBITUSSIN DM)  100-10 MG/5ML syrup 10 mL  Every 6 Hours PRN      06/01/21 0507    06/01/21 0429  ECG 12 Lead  STAT      06/01/21 0428    06/01/21 0315  dilTIAZem (CARDIZEM) injection 5 mg  Once      06/01/21 0220    06/01/21 0230  metoprolol tartrate (LOPRESSOR) injection 5 mg  Once      06/01/21 0130    06/01/21 0230  lidocaine (LIDODERM) 5 % 1 patch  Every 24 Hours Scheduled      06/01/21 0143    06/01/21 0145  metoprolol tartrate (LOPRESSOR) injection 5 mg  Once      06/01/21 0053    06/01/21 0143  tiZANidine (ZANAFLEX) tablet 2 mg  Every 12 Hours PRN      06/01/21 0143    06/01/21 0142  benzonatate (TESSALON) capsule 100 mg  3 Times Daily PRN      06/01/21 0143    06/01/21 0048  ECG 12 Lead  STAT      06/01/21 0047    06/01/21 0041  benzonatate (TESSALON) capsule 100 mg  3 Times Daily PRN,   Status:  Discontinued      06/01/21 0041    05/31/21 2200  cefTRIAXone (ROCEPHIN) 1 g/100 mL 0.9% NS (MBP)  Every 24 Hours      05/31/21 0025    05/31/21 2200  guaiFENesin-dextromethorphan (ROBITUSSIN DM) 100-10 MG/5ML syrup 5 mL  Once      05/31/21 2037    05/31/21 2004  POC Glucose Once  Once      05/31/21 1956    05/31/21 1613  POC Glucose Once  Once      05/31/21 1606    05/31/21 1208  SLP Consult: Eval & Treat Other; coughs with po intake  Once      05/31/21 1207    05/31/21 0900  insulin detemir (LEVEMIR) injection 40 Units  Daily      05/31/21 0140    05/31/21 0730  insulin aspart (novoLOG) injection 0-9 Units  3 Times Daily Before Meals      05/30/21 2344    05/31/21 0700  POC Glucose 4x Daily AC & at Bedtime  4 Times Daily Before Meals & at Bedtime     Comments: If bedtime blood glucose is greater than 350 mg/dl, call MD.      05/30/21 2344    05/31/21 0700  POC Glucose 4x Daily AC & at Bedtime  4 Times Daily Before Meals & at Bedtime     Comments: If bedtime blood glucose is greater than 350 mg/dl, call MD.      05/31/21 0139    05/31/21 0400  Strict Intake & Output  Every 4 Hours      05/31/21 0025    05/31/21 0233   acetaminophen (TYLENOL) tablet 650 mg  Every 6 Hours PRN      05/31/21 0233    05/31/21 0200  heparin (porcine) 5000 UNIT/ML injection 5,000 Units  Every 12 Hours Scheduled      05/31/21 0025    05/31/21 0138  glucagon (human recombinant) (GLUCAGEN DIAGNOSTIC) injection 1 mg  Every 15 Minutes PRN      05/31/21 0139    05/31/21 0138  dextrose (GLUTOSE) oral gel 15 g  Every 15 Minutes PRN      05/31/21 0139    05/31/21 0138  dextrose (D50W) 25 g/ 50mL Intravenous Solution 25 g  Every 15 Minutes PRN      05/31/21 0139    05/31/21 0115  sodium chloride 0.9 % flush 10 mL  Every 12 Hours Scheduled      05/31/21 0025    05/31/21 0115  sodium chloride 0.9 % infusion  Continuous      05/31/21 0025    05/31/21 0100  docusate sodium (COLACE) capsule 100 mg  2 Times Daily      05/30/21 2350    05/31/21 0047  Orthostatic Blood Pressure  Every Shift      05/31/21 0046    05/31/21 0026  Daily Weights  Daily      05/31/21 0025    05/31/21 0025  sodium chloride 0.9 % flush 10 mL  As Needed      05/31/21 0025    05/30/21 2350  bisacodyl (DULCOLAX) suppository 10 mg  Once As Needed      05/30/21 2350    05/30/21 2349  polyethylene glycol (MIRALAX) packet 17 g  2 Times Daily PRN      05/30/21 2350    05/30/21 2343  dextrose (GLUTOSE) oral gel 15 g  Every 15 Minutes PRN      05/30/21 2344    05/30/21 2343  dextrose (D50W) 25 g/ 50mL Intravenous Solution 25 g  Every 15 Minutes PRN      05/30/21 2344    05/30/21 2343  glucagon (human recombinant) (GLUCAGEN DIAGNOSTIC) injection 1 mg  Every 15 Minutes PRN      05/30/21 2344    05/30/21 2217  sodium chloride 0.9 % flush 10 mL  As Needed      05/30/21 2217    05/30/21 0000  HYDROcodone-acetaminophen (NORCO) 5-325 MG per tablet  Every 6 Hours PRN      05/30/21 1958    Unscheduled  Up With Assistance  As Needed      05/31/21 0025    --  insulin glargine (LANTUS, SEMGLEE) 100 UNIT/ML injection  Daily      05/30/21 9634    --  tiZANidine (ZANAFLEX) 2 MG half tablet  2 times daily      05/30/21  2357    --  montelukast (SINGULAIR) 10 MG tablet  Daily      05/30/21 2357    --  ezetimibe 10 MG tablet 10 mg, simvastatin 40 MG tablet 40 mg  Nightly      05/30/21 2357    --  benzonatate (TESSALON) 100 MG capsule  3 Times Daily PRN      05/30/21 2357    --  potassium chloride 10 MEQ CR tablet  Daily      05/30/21 2357    --  DULoxetine (CYMBALTA) 30 MG capsule  Daily      05/30/21 2357    --  triamterene-hydrochlorothiazide (MAXZIDE-25) 37.5-25 MG per tablet  Daily      05/30/21 2357    --  esomeprazole (nexIUM) 40 MG capsule  Every Morning Before Breakfast      05/30/21 2357    --  albuterol sulfate  (90 Base) MCG/ACT inhaler  Every 6 Hours PRN      05/30/21 2357    --  donepezil (ARICEPT) 5 MG tablet  Daily      05/30/21 2357    --  furosemide (LASIX) 20 MG tablet  Daily      05/31/21 1026    --  budesonide (PULMICORT) 180 MCG/ACT inhaler  2 Times Daily - RT      05/31/21 1026    --  amLODIPine (NORVASC) 5 MG tablet  Daily      05/31/21 1026                Operative/Procedure Notes (last 24 hours) (Notes from 05/31/21 1130 through 06/01/21 1130)    No notes of this type exist for this encounter.            Physician Progress Notes (last 24 hours) (Notes from 05/31/21 1130 through 06/01/21 1130)      Kat Villafuerte PA-C at 06/01/21 0133        Was notified by RN that patient was in atrial fibrillation with RVR with rates 120-130's. STAT EKG obtained confirming atrial fibrillation with RVR with . IV metoprolol 5 mg administered.     Update: Patient was still running atrial fibrillation 120's, so another 5 mg of IV metroprolol administered. Will continue to monitor.      Update: Patient continuing to run 120-130's, so IV cardizem 5 mg was administered.     Update: Patient converted to sinus rhythm HR 90's. EKG below to confirm.               Electronically signed by Kat Villafuerte PA-C at 06/01/21 0704     Rocky Méndez,  at 05/31/21 1326              [facility]  HOSPITALIST PROGRESS NOTE     Patient Identification:  Name:  Andreea Marshall  Age:  71 y.o.  Sex:  female  :  1950  MRN:  [id number]  Visit Number:  [id number]  Primary Care Provider:  Provider, No Known    Length of stay:  1    Chief complaint: None    Subjective:    Patient states she is feeling well today and has no specific complaints.  She does deny fever, chills, sweats, rigors, nausea, vomiting, dysuria or any other symptoms at this time.  Upon further questioning, patient denies any dizziness or lightheadedness at home.  She does report frequent falls but is not sure why this occurs.  She states that she will be standing up on her own 2 feet and then the next minute she will be on the floor.  ----------------------------------------------------------------------------------------------------------------------  Current Hospital Meds:  cefTRIAXone, 1 g, Intravenous, Q24H  docusate sodium, 100 mg, Oral, BID  heparin (porcine), 5,000 Units, Subcutaneous, Q12H  insulin aspart, 0-9 Units, Subcutaneous, TID AC  insulin detemir, 40 Units, Subcutaneous, Daily  sodium chloride, 10 mL, Intravenous, Q12H      sodium chloride, 75 mL/hr, Last Rate: 75 mL/hr (21 1318)      ----------------------------------------------------------------------------------------------------------------------  Vital Signs:  Temp:  [98 °F (36.7 °C)-99.2 °F (37.3 °C)] 98 °F (36.7 °C)  Heart Rate:  [] 90  Resp:  [14-18] 18  BP: (144-181)/() 150/75      21  1624 21  0023 21  0505   Weight: 104 kg (230 lb) 117 kg (257 lb) 117 kg (257 lb) (admit weight pt not on floor 24 hrs)     Body mass index is 45.53 kg/m².    Intake/Output Summary (Last 24 hours) at 2021 1326  Last data filed at 2021 0800  Gross per 24 hour   Intake 1042.4 ml   Output 200 ml   Net 842.4 ml     Diet Regular; Consistent Carbohydrate, Renal,  Cardiac  ----------------------------------------------------------------------------------------------------------------------  Physical exam:  Constitutional: Morbidly obese  female in no apparent distress.     HENT:  Head:  Normocephalic and atraumatic.  Mouth:  Moist mucous membranes.    Eyes:  Conjunctivae and EOM are normal.  Pupils are equal, round, and reactive to light.  No scleral icterus.    Neck:  Neck supple. No thyromegaly.  No JVD present.    Cardiovascular:  Regular rate and rhythm with no murmurs, rubs, clicks or gallops appreciated.  Pulmonary/Chest:  Clear to auscultation bilaterally with no crackles, wheezes or rhonchi appreciated.  Abdominal:  Soft. Nontender. Nondistended  Bowel sounds are normal in all four quadrants. No organomegally appreciated.   Musculoskeletal:   1+ pitting edema bilateral lower extremities, right lower extremity wrapped in Ace bandage.  Neurological:  Alert and oriented to person, place, and time. Cranial nerves II-XII intact with no focal deficits.  No facial droop.  No slurred speech.   Skin:  Warm and dry to palpation with no rashes or lesions appreciated.  Peripheral vascular:  2+ radial and pedal pulses in bilateral upper and lower extremities.  Psychiatric:  Alert and oriented x3, demonstrates appropriate judgement and insight.  ----------------------------------------------------------------------------------------------------------------------  Tele:    ----------------------------------------------------------------------------------------------------------------------  Results from last 7 days   Lab Units 05/31/21  0820 05/31/21 0212   TROPONIN T ng/mL <0.010 <0.010     Results from last 7 days   Lab Units 05/31/21  0212 05/30/21 2230 05/30/21 2137   CRP mg/dL 1.45*  --  1.30*   LACTATE mmol/L  --  1.9  --    WBC 10*3/mm3 13.51*  --  19.10*   HEMOGLOBIN g/dL 15.2  --  17.0*   HEMATOCRIT % 46.5  --  52.5*   MCV fL 91.9  --  91.5   MCHC g/dL 32.7  --   32.4   PLATELETS 10*3/mm3 284  --  358     Results from last 7 days   Lab Units 05/30/21  2303   PH, ARTERIAL pH units 7.487*   PO2 ART mm Hg 69.2*   PCO2, ARTERIAL mm Hg 40.6   HCO3 ART mmol/L 30.6*     Results from last 7 days   Lab Units 05/31/21  0212 05/30/21  2137   SODIUM mmol/L 135* 136   POTASSIUM mmol/L 3.4* 3.8   CHLORIDE mmol/L 95* 93*   CO2 mmol/L 26.3 26.0   BUN mg/dL 13 13   CREATININE mg/dL 0.96 1.07*   EGFR IF NONAFRICN AM mL/min/1.73 57* 50*   CALCIUM mg/dL 9.2 10.1   GLUCOSE mg/dL 324* 309*   ALBUMIN g/dL 3.78 4.17   BILIRUBIN mg/dL 0.9 1.0   ALK PHOS U/L 118* 133*   AST (SGOT) U/L 14 17   ALT (SGPT) U/L 13 17   Estimated Creatinine Clearance: 64.4 mL/min (by C-G formula based on SCr of 0.96 mg/dL).    No results found for: AMMONIA      No results found for: BLOODCX  Urine Culture   Date Value Ref Range Status   05/30/2021 >100,000 CFU/mL Gram Negative Bacilli (A)  Preliminary     No results found for: WOUNDCX  No results found for: STOOLCX    I have personally looked at the labs and they are summarized above.  ----------------------------------------------------------------------------------------------------------------------  Imaging Results (Last 24 Hours)     Procedure Component Value Units Date/Time    US Carotid Bilateral [[id number]] Collected: 05/31/21 1016     Updated: 05/31/21 1021    Narrative:      EXAMINATION: US CAROTID BILATERAL-      Technique: Multiple real-time color Doppler images were acquired of  bilateral carotid arteries.     Stenosis measurements if obtained, were performed by the NASCET or  similar method.        CLINICAL INDICATION:     pre-syncope, frequent falls;  S43.401A-Unspecified sprain of right shoulder joint, initial encounter;  S80.01XA-Contusion of right knee, initial encounter; S16.1XXA-Strain of  muscle, fascia and tendon at neck level, initial encounter;  N39.0-Urinary tract infection, site not specified     COMPARISON:    None     FINDINGS:         RIGHT:  Mild intimal thickening is noted and mild plaque. No occlusion.  RIGHT CCA PSV:138 cm/s  RIGHT ICA PSV: 72 cm/s  RIGHT ICA EDV: 9 cm/s  Right ICA/CCA Ratio: 0.5  Anterograde flow is demonstrated in RIGHT vertebral artery.     LEFT:  Mild interval thickening and mild plaque. No occlusion. Tortuosity left  ICA is noted.  LEFT CCA PSV: 126 cm/s  LEFT ICA PSV: 43 cm/s  LEFT EDV: 9 cm/s  Left ICA/CCA Ratio: 0.3  Anterograde flow is demonstrated in LEFT vertebral artery.          Impression:      1. Mild plaque left greater than right carotid systems. No occlusion.  2. No hemodynamically significant stenosis of either RIGHT or LEFT ICA.  3. Antegrade flow noted both vertebral arteries.     This report was finalized on 5/31/2021 10:19 AM by Dr. Jericho Piper MD.       XR Chest 1 View [[id number]] Collected: 05/31/21 0136     Updated: 05/31/21 0138    Narrative:      CR Chest 1 Vw    INDICATION:   Cough. Sepsis.     COMPARISON:    None available.    FINDINGS:  Single portable AP view(s) of the chest.    The heart and mediastinal contours are normal. The lungs are clear. No pneumothorax or pleural effusion.       Impression:      No acute cardiopulmonary findings.    Signer Name: Deven Rios MD   Signed: 5/31/2021 1:36 AM   Workstation Name: [facility]    [facility]    CT Abdomen Pelvis Stone Protocol [[id number]] Collected: 05/30/21 2343     Updated: 05/30/21 2345    Narrative:      CT Abdomen Pelvis WO    INDICATION:   Flank pain. Urinary tract infection. Fall today.    TECHNIQUE:   CT of the abdomen and pelvis without IV contrast. Coronal and sagittal reconstructions were obtained.  Radiation dose reduction techniques included automated exposure control or exposure modulation based on body size. Count of known CT and cardiac nuc  med studies performed in previous 12 months: 4.     COMPARISON:   None available.    FINDINGS:  There is some motion degradation. There is mild  scarring/atelectasis in the lung bases. There is atherosclerotic disease with no aortic aneurysm. Gallbladder is surgically absent. No biliary obstruction is seen. 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 No renal or ureteral stones are identified, and there is no hydronephrosis. Unenhanced solid abdominal organs are otherwise unremarkable. Urinary bladder is normal. Solid pelvic organs are normal.    Moderate stool burden in the colon may indicate constipation. There is no evidence of acute colitis. The appendix is not clearly identified, but there is no evidence of acute appendicitis. There is some gas in small bowel loops suggesting a mild ileus.  There is a diverticulum in the third portion of the duodenum. No free fluid or adenopathy is identified. No acute fractures are seen.      Impression:        1. No renal or ureteral stones. No hydronephrosis.  2. Moderate stool burden in the colon may indicate constipation.  3. Mild small bowel ileus suspected. No bowel obstruction.  4. Cholecystectomy without biliary obstruction.      Signer Name: Deven Rios MD   Signed: 5/30/2021 11:43 PM   Workstation Name: [facility]    [facility]    CT Head Without Contrast [[id number]] Collected: 05/30/21 2302     Updated: 05/30/21 2304    Narrative:      CT Head WO    HISTORY:   Generalized weakness today. Fall today.    TECHNIQUE:   Routine noncontrast head CT. Coronal and sagittal reformatted images. Radiation dose reduction techniques included automated exposure control or exposure modulation based on body size. Count of known CT and cardiac nuc med studies performed in previous  12 months: 0.     COMPARISON:   None.    FINDINGS:   No hemorrhage, acute infarction, mass lesion, or abnormal extra-axial fluid collection. No midline shift or focal mass effect. Ventricular system is normal in size and configuration. Moderate generalized atrophy and chronic small  vessel disease  throughout the supratentorial white matter. No acute osseous abnormality. Visualized paranasal sinuses are clear. Visualized mastoid air cells are clear.      Impression:      No acute intracranial abnormality. Age-related senescent changes.          Signer Name: Venu Roque MD   Signed: 5/30/2021 11:02 PM   [facility]    [facility]    CT Upper Extremity Right Without Contrast [[id number]] Collected: 05/30/21 1947     Updated: 05/30/21 1949    Narrative:      CT UE RT WO    INDICATION:    Right shoulder pain status post fall today.    TECHNIQUE:   CT of the right shoulder without IV contrast. Coronal and sagittal reconstructions were obtained.  Radiation dose reduction techniques included automated exposure control or exposure modulation based on body size. Count of known CT and cardiac nuc med  studies performed in previous 12 months: 0.     COMPARISON:    Right shoulder x-rays performed the same date.    FINDINGS:  There is no evidence of acute fracture or dislocation of the right shoulder. There is chronic flattening of the right humeral head, likely secondary to remote posttraumatic deformity. Mild degenerative change of the glenohumeral joint. Moderate  degenerative change of the acromioclavicular joint. No glenohumeral effusion. Periarticular soft tissues are unremarkable. Included lung fields are clear.      Impression:        1. No acute fracture or dislocation of the right shoulder.  2. Chronic flattening of the right humeral head, likely secondary to remote posttraumatic deformity. Mild glenohumeral arthrosis.  3. Moderate AC joint arthrosis.    Signer Name: Venu Roque MD   Signed: 5/30/2021 7:47 PM   Workstation Name: [facility]    Radiology [facility]    XR Ankle 3+ View Right [[id number]] Collected: 05/30/21 1812     Updated: 05/30/21 1814    Narrative:      CR Ankle Min 3 Vws RT    INDICATION:   Right ankle pain status  post fall today.    COMPARISON:   None.    FINDINGS:  3 view(s) of the right ankle.  No fracture or dislocation. Prominent plantar calcaneal spur. Ossification at the Achilles tendon insertion. Mild degenerative changes throughout the midfoot. No bone erosion or destruction. No foreign body.      Impression:      No acute fracture or dislocation.    Signer Name: Venu Roque MD   Signed: 5/30/2021 6:12 PM   Workstation Name: [facility]    [facility]    XR Knee 4+ View Right [[id number]] Collected: 05/30/21 1810     Updated: 05/30/21 1812    Narrative:      CR Knee Min 4 Vws RT    INDICATION:   Anterior right knee pain status post fall today.    COMPARISON:   None available.    FINDINGS:  4 view(s) of the right knee.  No acute fracture or dislocation. Small joint effusion. Moderate degenerative changes in all 3 compartments. No bone erosion or destruction.  No foreign body.      Impression:      Moderate tricompartmental arthrosis. Small joint effusion. No acute fracture or dislocation.    Signer Name: Venu Roque MD   Signed: 5/30/2021 6:10 PM   Workstation Name: [facility]    Radiology [facility]    XR Shoulder 2+ View Right [[id number]] Collected: 05/30/21 1809     Updated: 05/30/21 1811    Narrative:      CR Shoulder Comp Min 2 Vws RT    INDICATION:   Right shoulder pain status post fall today.    COMPARISON:   None available.    FINDINGS:   3 views of the right shoulder.  Slightly flattened appearance of the humeral head of indeterminate age. This may also be slightly projectional. However, a humeral head fracture cannot be excluded. No glenohumeral dislocation. Moderate degenerative  changes of the acromioclavicular joint.  No foreign body.      Impression:      Slightly flattened appearance of the humeral head, indeterminate age. This may be chronic in nature and somewhat due to projectional artifact. However, a humeral head fracture cannot be excluded on the  exam. No glenohumeral dislocation. CT of the right  shoulder may be considered if further characterization is warranted.    Signer Name: Venu Roque MD   Signed: 5/30/2021 6:09 PM   [facility]station [facility]: [facility]    [facility]    CT Lumbar Spine Without Contrast [[id number]] Collected: 05/30/21 1725     Updated: 05/30/21 1728    Narrative:      CT Spine Lumbar WO    INDICATION:    Back pain status post fall today.    TECHNIQUE:   CT of the lumbar spine without IV contrast. Coronal and sagittal reconstructions were obtained.  Radiation dose reduction techniques included automated exposure control or exposure modulation based on body size. Count of known CT and cardiac nuc med  studies performed in previous 12 months: 0.     COMPARISON:    None available.    FINDINGS:  No acute fractures. Lumbar vertebral body heights and alignment are adequately maintained. Mild to moderate multilevel discogenic degenerative changes, most prominent at L2-3. Moderate multilevel facet degeneration. Mild central canal stenosis at  multiple levels. Paravertebral soft tissues are unremarkable. Included sacrum and SI joints are intact.      Impression:        1. No acute lumbar spine injury.  2. Multilevel degenerative changes, detailed above.    Signer Name: Venu Roque MD   Signed: 5/30/2021 5:25 PM   Workstation [facility]: [facility]    [facility]        ----------------------------------------------------------------------------------------------------------------------  Assessment and Plan:    1.  Sepsis -present on admission, continue to treat underlying etiology which is felt to be secondary to acute cystitis    2.  Acute cystitis -continue Rocephin 1 g IV daily, urine culture currently pending, but preliminary does demonstrate greater than 100,000 CFU of gram-negative bacilli    3.  Frequent falls -etiology unclear, bilateral carotid artery ultrasound demonstrates no evidence of  occlusion or hemodynamically significant stenosis.  Transthoracic echocardiogram demonstrates chronic diastolic CHF with preserved left ventricular systolic function.      4.  Insulin-dependent type 2 diabetes mellitus -continue Accu-Cheks before every meal and nightly with sliding scale insulin    5.  General debility -continue PT/OT, will likely require placement on discharge      Disposition Case management consulted for assistance with placement.  Will likely be stable for discharge in the next 24 to 48 hours.        Rocky Méndez DO  21  13:26 EDT    Electronically signed by Rocky Méndez DO at 21 1334       Consult Notes (last 24 hours) (Notes from 21 1130 through 21 1130)    No notes of this type exist for this encounter.            Physical Therapy Notes (last 24 hours) (Notes from 21 1130 through 21 1130)      Heidy Fajardo, PT at 21 1340  Version 1 of 1         [facility] - Physical Therapy Initial Evaluation   Sky     Patient Name: Andreea Marshall  : 1950  MRN: [id number]  Today's Date: 2021   Onset of Illness/Injury or Date of Surgery: 21       PT Assessment (last 12 hours)      PT Evaluation and Treatment     Row Name 21 1326          Physical Therapy Time and Intention    Subjective Information  complains of;weakness;pain  -AG     Document Type  evaluation  -AG     Mode of Treatment  physical therapy  -AG     Patient Effort  adequate  -AG     Symptoms Noted During/After Treatment  fatigue;increased pain  -AG     Row Name 21 1326          General Information    Patient Profile Reviewed  yes  -AG     Onset of Illness/Injury or Date of Surgery  21  -AG     Referring Physician  Dr. Wood  -AG     Patient Observations  alert;cooperative;agree to therapy  -AG     Patient/Family/Caregiver Comments/Observations  pt. supine; morbidly obese.  R knee is ace wrapped post fall, pt. reports experiencing  significant pain.   -AG     Prior Level of Function  min assist:;all household mobility sometimes needed assistance from dtr. w/ ambulation  -AG     Equipment Currently Used at Home  cane, straight;walker, rolling  -AG     Pertinent History of Current Functional Problem  pt. admitted w/ UTI; hx frequent falls.  hx dementia, COPD, GERD.  Uses walker or cane for ambulation, however, at times uses no assistive device.   -AG     Existing Precautions/Restrictions  fall  -AG     Limitations/Impairments  safety/cognitive  -AG     Equipment Issued to Patient  gait belt  -AG     Risks Reviewed  patient:;LOB;dizziness;increased discomfort;change in vital signs  -AG     Benefits Reviewed  patient:;improve function;increase independence;increase strength;increase balance;decrease risk of DVT;increase knowledge  -AG     Barriers to Rehab  previous functional deficit;cognitive status  -AG     Row Name 05/31/21 1326          Previous Level of Function/Home Environm    Household Ambulation, Premorbid Functional Level  needs assistive device for safe performance;needs assistance for safe performance  -AG     Community Ambulation, Premorbid Functional Level  needs assistive device for safe performance;needs assistance for safe performance  -AG     Row Name 05/31/21 1326          Living Environment    Current Living Arrangements  home/apartment/condo  -AG     Home Accessibility  wheelchair accessible  -AG     Lives With  child(binh), adult lives with her daughter and grandchildren  -AG     Row Name 05/31/21 1326          Home Use of Assistive/Adaptive Equipment    Equipment Currently Used at Home  cane, straight;walker, rolling  -AG     Row Name 05/31/21 1326          Sensory    Hearing Status  WFL  -AG     Row Name 05/31/21 1326          Vision Assessment/Intervention    Visual Impairment/Limitations  corrective lenses full-time  -AG     Row Name 05/31/21 1326          Cognition    Affect/Mental Status (Cognitive)  confused  -AG      Orientation Status (Cognition)  oriented to;person  -AG     Follows Commands (Cognition)  follows one-step commands;increased processing time needed;initiation impaired;physical/tactile prompts required;repetition of directions required;verbal cues/prompting required  -     Personal Safety Interventions  fall prevention program maintained;gait belt;nonskid shoes/slippers when out of bed;supervised activity  -AG     Row Name 05/31/21 1326          Pain    Additional Documentation  Pain Scale: FACES Pre/Post-Treatment (Group)  -AG     Row Name 05/31/21 1326          Pain Scale: FACES Pre/Post-Treatment    Pain: FACES Scale, Pretreatment  4-->hurts little more  -AG     Posttreatment Pain Rating  4-->hurts little more  -AG     Pain Location - Side  Right  -AG     Pain Location - Orientation  generalized  -AG     Pain Location  knee  -AG     Row Name 05/31/21 1326          Range of Motion (ROM)    Range of Motion  -- R knee decr AROM d/t pain  -AG     Row Name 05/31/21 1326          Range of Motion Comprehensive    General Range of Motion  -- R shoulder ~50% of full AROM  -AG     Row Name 05/31/21 1326          Strength (Manual Muscle Testing)    Strength (Manual Muscle Testing)  -- L LE 4-/5; R quads 2+/5 (limited by pain)  -AG     Row Name 05/31/21 1326          Mobility    Extremity Weight-bearing Status  left lower extremity;right lower extremity  -     Right Lower Extremity (Weight-bearing Status)  weight-bearing as tolerated (WBAT)  -AG     Row Name 05/31/21 1326          Bed Mobility    Bed Mobility  rolling left;rolling right;scooting/bridging;supine-sit;sit-supine  -     Scooting/Bridging [place] (Bed Mobility)  verbal cues;nonverbal cues (demo/gesture);moderate assist (50% patient effort)  -AG     Supine-Sit [place] (Bed Mobility)  verbal cues;nonverbal cues (demo/gesture);moderate assist (50% patient effort)  -AG     Sit-Supine [place] (Bed Mobility)  verbal cues;nonverbal cues  (demo/gesture);maximum assist (25% patient effort)  -AG     Bed Mobility, Safety Issues  decreased use of legs for bridging/pushing  -AG     Assistive Device (Bed Mobility)  bed rails;draw sheet  -AG     Row Name 05/31/21 1326          Transfers    Transfers  bed-chair transfer;chair-bed transfer;sit-stand transfer;stand-sit transfer;stand pivot/stand step transfer  -AG     Sit-Stand [place] (Transfers)  verbal cues;nonverbal cues (demo/gesture);moderate assist (50% patient effort)  -AG     Stand-Sit [place] (Transfers)  verbal cues;nonverbal cues (demo/gesture);moderate assist (50% patient effort)  -AG     Row Name 05/31/21 1326          Sit-Stand Transfer    Assistive Device (Sit-Stand Transfers)  walker, front-wheeled  -AG     Row Name 05/31/21 1326          Stand-Sit Transfer    Assistive Device (Stand-Sit Transfers)  walker, front-wheeled  -AG     Row Name 05/31/21 1326          Gait/Stairs (Locomotion)    [place] Level (Gait)  verbal cues;nonverbal cues (demo/gesture);moderate assist (50% patient effort)  -AG     Assistive Device (Gait)  walker, front-wheeled  -AG     Deviations/Abnormal Patterns (Gait)  antalgic  -AG     Comment (Gait/Stairs)  pt. unable to perform forward ambulation d/t limited weightshifting, R knee pain.  Able to ambulate 5-6 small steps to L toward HOB.   -AG     Row Name 05/31/21 1326          Safety Issues, Functional Mobility    Impairments Affecting Function (Mobility)  balance;endurance/activity tolerance;pain;range of motion (ROM);strength  -AG     Row Name 05/31/21 1326          Balance    Balance Assessment  sitting static balance;sitting dynamic balance;sit to stand dynamic balance;standing static balance;standing dynamic balance  -AG     Static Sitting Balance  WFL;unsupported;sitting, edge of bed  -AG     Static Standing Balance  moderate impairment;supported;standing  -AG     Dynamic Standing Balance  moderate impairment;supported;standing  -AG     Row Name  05/31/21 1326          Coping    Observed Emotional State  calm;cooperative  -AG     Verbalized Emotional State  acceptance  -AG     Row Name 05/31/21 1326          Plan of Care Review    Plan of Care Reviewed With  patient  -AG     Row Name 05/31/21 1326          Physical Therapy Goals    Bed Mobility Goal Selection (PT)  bed mobility, PT goal 1  -AG     Transfer Goal Selection (PT)  transfer, PT goal 1  -AG     Gait Training Goal Selection (PT)  gait training, PT goal 1  -AG     Row Name 05/31/21 1326          Bed Mobility Goal 1 (PT)    Activity/Assistive Device (Bed Mobility Goal 1, PT)  rolling to left;rolling to right;scooting;sit to supine/supine to sit  -AG     [place] Level/Cues Needed (Bed Mobility Goal 1, PT)  contact guard assist  -AG     Time Frame (Bed Mobility Goal 1, PT)  by discharge  -     Row Name 05/31/21 1326          Transfer Goal 1 (PT)    Activity/Assistive Device (Transfer Goal 1, PT)  sit-to-stand/stand-to-sit;bed-to-chair/chair-to-bed;toilet;walker, rolling  -AG     [place] Level/Cues Needed (Transfer Goal 1, PT)  contact guard assist  -AG     Time Frame (Transfer Goal 1, PT)  by discharge  -     Row Name 05/31/21 1326          Gait Training Goal 1 (PT)    Activity/Assistive Device (Gait Training Goal 1, PT)  gait (walking locomotion);assistive device use;decrease fall risk;diminish gait deviation;improve balance and speed;walker, rolling  -AG     [place] Level (Gait Training Goal 1, PT)  contact guard assist  -AG     Distance (Gait Training Goal 1, PT)  10  -AG     Time Frame (Gait Training Goal 1, PT)  by discharge  -     Row Name 05/31/21 1326          Positioning and Restraints    Pre-Treatment Position  in bed  -AG     Post Treatment Position  bed  -AG     In Bed  supine;call light within reach;encouraged to call for assist;side rails up x3;heels elevated  -[facility] Name 05/31/21 1326          Therapy Assessment/Plan (PT)    Patient/Family Therapy Goals  Statement (PT)  return home  -AG     Functional Level at Time of Evaluation (PT)  max/ mod A  -AG     PT Diagnosis (PT)  decr functional mobility  -AG     Rehab Potential (PT)  good, to achieve stated therapy goals  -AG     Criteria for Skilled Interventions Met (PT)  yes;meets criteria  -AG     Predicted Duration of Therapy Intervention (PT)  until d/c  -AG     Problem List (PT)  problems related to;balance;cognition;mobility;range of motion (ROM);strength;pain  -AG     Activity Limitations Related to Problem List (PT)  unable to ambulate safely;unable to transfer safely  -AG     Row Name 05/31/21 1326          Therapy Plan Review/Discharge Plan (PT)    Therapy Plan Review (PT)  evaluation/treatment results reviewed;care plan/treatment goals reviewed;risks/benefits reviewed;current/potential barriers reviewed;participants voiced agreement with care plan;participants included;patient  -AG       User Key  (r) = Recorded By, (t) = Taken By, (c) = Cosigned By    Initials Name Provider Type    AG Heidy Fajardo, PT Physical Therapist        Physical Therapy Education                 Title: PT OT SLP Therapies (Done)     Topic: Physical Therapy (Done)     Point: Mobility training (Done)     Learning Progress Summary           Patient Acceptance, E,D, VU,NR by  at 5/31/2021 1325                   Point: Home exercise program (Done)     Learning Progress Summary           Patient Acceptance, E,D, VU,NR by  at 5/31/2021 1325                   Point: Body mechanics (Done)     Learning Progress Summary           Patient Acceptance, E,D, VU,NR by  at 5/31/2021 1325                   Point: Precautions (Done)     Learning Progress Summary           Patient Acceptance, E,D, VU,NR by  at 5/31/2021 1325                               User Key     Initials Effective Dates Name Provider Type [facility] 04/03/18 -  Heidy Fajardo, PT Physical Therapist PT              PT Recommendation and Plan  Anticipated Discharge  Disposition (PT): inpatient rehabilitation facility, skilled nursing facility  Planned Therapy Interventions (PT): balance training, bed mobility training, gait training, home exercise program, patient/family education, postural re-education, ROM (range of motion), strengthening, transfer training  Therapy Frequency (PT): other (see comments) (3-5 times/ week per priority)  Plan of Care Reviewed With: patient       Time Calculation:   PT Charges     Row Name 21 1325             Time Calculation    PT Received On  21  -      PT Goal Re-Cert Due Date  21  -        User Key  (r) = Recorded By, (t) = Taken By, (c) = Cosigned By    Initials Name Provider Type     Heidy Fajardo, PT Physical Therapist        Therapy Charges for Today     Code Description Service Date Service Provider Modifiers Qty    16006287993 HC PT EVAL HIGH COMPLEXITY 4 2021 Heidy Fajardo, PT GP 1               Heidy Fajardo PT  2021      Electronically signed by Heidy Fajardo, PT at 21 1340          Occupational Therapy Notes (last 24 hours) (Notes from 21 1130 through 21 1130)      Jericho Mitchell, OT at 21 1145          Acute Care - Occupational Therapy Initial Evaluation   [place]     Patient Name: Andreea Marshall  : 1950  MRN: [id number]  Today's Date: 2021             Admit Date: 2021       ICD-10-CM ICD-9-CM   1. Sprain of right shoulder, unspecified shoulder sprain type, initial encounter  S43.401A 840.9   2. Contusion of right knee, initial encounter  S80.01XA 924.11   3. Strain of neck muscle, initial encounter  S16.1XXA 847.0   4. Acute UTI  N39.0 599.0     Patient Active Problem List   Diagnosis   • UTI (urinary tract infection)   • Dementia (CMS/HCC)   • Paroxysmal atrial fibrillation (CMS/HCC)   • COPD (chronic obstructive pulmonary disease) (CMS/HCC)   • Osteoporosis with fracture   • Diverticulosis   • GERD without esophagitis   • Type 2 diabetes mellitus, with  long-term current use of insulin (CMS/HCC)     Past Medical History:   Diagnosis Date   • COPD (chronic obstructive pulmonary disease) (CMS/HCC)    • Dementia (CMS/HCC)    • Diverticulosis    • Essential hypertension    • GERD (gastroesophageal reflux disease)    • Osteoporosis    • Paroxysmal atrial fibrillation (CMS/HCC)    • Type II diabetes mellitus (CMS/HCC)      Past Surgical History:   Procedure Laterality Date   • APPENDECTOMY     • CHOLECYSTECTOMY     • TONSILLECTOMY              OT ASSESSMENT FLOWSHEET (last 12 hours)      OT Evaluation and Treatment     Row Name 05/31/21 1136                   OT Time and Intention    Document Type  evaluation  -KR        Mode of Treatment  occupational therapy  -KR        Patient Effort  adequate  -KR           General Information    General Observations of Patient  alert/cooperative  -KR           Cognition    Affect/Mental Status (Cognitive)  confused  -KR        Orientation Status (Cognition)  oriented to;person  -KR        Follows Commands (Cognition)  increased processing time needed  -KR           Range of Motion Comprehensive    Comment, General Range of Motion  BUE shoulders, 3/5, remaining 4/5  -KR           Strength Comprehensive (MMT)    Comment, General Manual Muscle Testing (MMT) Assessment  BUE shoulders 3-/5, remaining 4/5  -KR           Activities of Daily Living    BADL Assessment/Intervention  bathing;upper body dressing;lower body dressing;grooming;feeding;toileting  -KR           Bathing Assessment/Intervention    [place] Level (Bathing)  bathing skills;moderate assist (50% patient effort)  -KR           Upper Body Dressing Assessment/Training    [place] Level (Upper Body Dressing)  upper body dressing skills;moderate assist (50% patient effort)  -KR           Lower Body Dressing Assessment/Training    [place] Level (Lower Body Dressing)  lower body dressing skills;maximum assist (25% patient effort)  -KR           Grooming  Assessment/Training    [place] Level (Grooming)  grooming skills;minimum assist (75% patient effort)  -KR           Self-Feeding Assessment/Training    [place] Level (Feeding)  feeding skills;set up  -KR           Toileting Assessment/Training    [place] Level (Toileting)  toileting skills;maximum assist (25% patient effort)  -KR           Plan of Care Review    Plan of Care Reviewed With  patient  -KR           OT Goals    Strength Goal Selection (OT)  strength, OT goal 1  -KR           Strength Goal 1 (OT)    Strength Goal 1 (OT)  BUE increase x 1 to enhance mobility/self care skills  -KR        Time Frame (Strength Goal 1, OT)  by discharge  -KR           Patient Education Goal (OT)    Activity (Patient Education Goal, OT)  AE/DME training to enhance safety/independence in home environment  -KR        [place]/Cues/Accuracy (Memory Goal 2, OT)  verbalizes understanding  -KR        Time Frame (Patient Education Goal, OT)  by discharge  -KR           Therapy Assessment/Plan (OT)    Criteria for Skilled Therapeutic Interventions Met (OT)  yes;skilled treatment is necessary  -KR        Planned Therapy Interventions (OT)  activity tolerance training;adaptive equipment training;BADL retraining;ROM/therapeutic exercise;strengthening exercise  -KR           Therapy Plan Review/Discharge Plan (OT)    Anticipated Discharge Disposition (OT)  extended care facility;inpatient rehabilitation facility  -KR          User Key  (r) = Recorded By, (t) = Taken By, (c) = Cosigned By    Initials Name Effective Dates    KR Jericho Mitchell, OT 04/03/18 -                OT Recommendation and Plan  Planned Therapy Interventions (OT): activity tolerance training, adaptive equipment training, BADL retraining, ROM/therapeutic exercise, strengthening exercise  Plan of Care Review  Plan of Care Reviewed With: patient  Plan of Care Reviewed With: patient        Time Calculation:     Therapy Charges for Today     Code  Description Service Date Service Provider Modifiers Qty    [id number]  OT EVAL MOD COMPLEXITY 4 2021 Jericho Mitchell OT GO 1               Jericho Mitchell OT  2021    Electronically signed by Jericho Mitchell OT at 21 1145          Speech Language Pathology Notes (last 24 hours) (Notes from 21 1130 through 21 1130)      Diana Real, MS CCC-SLP at 21 1439          Acute Care - Speech Language Pathology   Swallow Initial Evaluation [facility]    CLINICAL DYSPHAGIA ASSESSMENT     Patient Name: Andreea Marshall  : 1950  MRN: [id number]  Today's Date: 2021  Onset of Illness/Injury or Date of Surgery: 21     Referring Physician: Dr. Wood      Admit Date: 2021    Andreea Marshall  is seen at bedside this this pm on 3S to assess safety/efficacy of swallowing fnx, determine safest/least restrictive diet tolerance. Her family member is present at bedside for this assessment.     Ms. Marshall was admitted to [facility] 21 2/2 more frequent falls. Her daughter, primary careprovider, reports that Ms. Marshall has fallen a total of 6 times overt the last 8 weeks. She suffered a fall 21 while attempting to ambulate to the bathroom w/ her walker. She began to feel dizzy and fell. She did sustain injuries to her right leg and an abrasion just under her nose. She presented w/ a UTI.     Ms. Marshall is referred to r/o dysphagia 2/2 reported chronic cough w/ cough observed by nursing staff w/ concerns of dysphagia. She denies any h/o PNA. She does indicate h/o asthma and bronchitis, along w/ chronic cough and significant NIMA.     Social History     Socioeconomic History   • Marital status:      Spouse name: Not on file   • Number of children: Not on file   • Years of education: Not on file   • Highest education level: Not on file   Tobacco Use   • Smoking status: Never Smoker   • Smokeless tobacco: Never Used   Substance and Sexual Activity   • Alcohol use: Never   • Drug use:  Never   • Sexual activity: Defer      Imaging:   CR Chest 1 Vw     INDICATION:   Cough. Sepsis.      COMPARISON:    None available.     FINDINGS:  Single portable AP view(s) of the chest.     The heart and mediastinal contours are normal. The lungs are clear. No pneumothorax or pleural effusion.      IMPRESSION:  No acute cardiopulmonary findings.     Signer Name: Deven Rios MD   Signed: 5/31/2021 1:36 AM   Workstation Name: [id number]    [facility]    Labs:  05/31/21 1007  POC Glucose Once   Collected: 05/31/21 1001  Final result  Specimen: Blood    Glucose 343High  mg/dL            05/31/21 0903  Troponin   Collected: 05/31/21 0820  Final result  Specimen: Blood    Troponin T <0.010 ng/mL            05/31/21 0640  POC Glucose Once   Collected: 05/31/21 0625  Final result  Specimen: Blood    Glucose 230High  mg/dL            05/31/21 0252  Troponin   Collected: 05/31/21 0212  Final result  Specimen: Blood    Troponin T <0.010 ng/mL            05/31/21 0252  Comprehensive Metabolic Panel   Collected: 05/31/21 0212  Final result  Specimen: Blood    Glucose 324High  mg/dL ALT (SGPT) 13 U/L   BUN 13 mg/dL AST (SGOT) 14 U/L   Creatinine 0.96 mg/dL Alkaline Phosphatase 118High  U/L   Sodium 135Low  mmol/L Total Bilirubin 0.9 mg/dL   Potassium 3.4Low  mmol/L eGFR Non African Am 57Low  mL/min/1.73   Chloride 95Low  mmol/L Globulin 3.4 gm/dL   CO2 26.3 mmol/L A/G Ratio 1.1 g/dL   Calcium 9.2 mg/dL BUN/Creatinine Ratio 13.5   Total Protein 7.2 g/dL Anion Gap 13.7 mmol/L   Albumin 3.78 g/dL            05/31/21 0252  C-reactive Protein   Collected: 05/31/21 0212  Final result  Specimen: Blood    C-Reactive Protein 1.45High  mg/dL            05/31/21 0244  Hemoglobin A1c   Collected: 05/31/21 0212  Final result  Specimen: Blood    Hemoglobin A1C 11.40High  %            05/31/21 0230  CBC Auto Differential   Collected: 05/31/21 0212  Final result  Specimen: Blood    WBC 13.51High   10*3/mm3 Lymphocyte Rel % 22.6 %   RBC 5.06 10*6/mm3 Monocyte Rel % 5.4 %   Hemoglobin 15.2 g/dL Eosinophil Rel % 1.2 %   Hematocrit 46.5 % Basophil Rel % 0.4 %   MCV 91.9 fL Immature Granulocyte Rel % 0.2 %   MCH 30.0 pg Neutrophils Absolute 9.47High  10*3/mm3   MCHC 32.7 g/dL Lymphocytes Absolute 3.06 10*3/mm3   RDW 12.6 % Monocytes Absolute 0.73 10*3/mm3   RDW-SD 42.7 fl Eosinophils Absolute 0.16 10*3/mm3   MPV 8.4 fL Basophils Absolute 0.06 10*3/mm3   Platelets 284 10*3/mm3 Immature Grans, Absolute 0.03 10*3/mm3   Neutrophil Rel % 70.2 % nRBC 0.0 /100 WBC          05/31/21 0047  POC Glucose Once   Collected: 05/31/21 0033  Final result  Specimen: Blood    Glucose 300High  mg/dL            05/31/21 0002  Acetone   Collected: 05/30/21 2311  Final result  Specimen: Blood    Acetone Negative           Diet Orders (active) (From admission, onward)     Start     Ordered    05/31/21 0141  Diet Regular; Consistent Carbohydrate, Renal, Cardiac  Diet Effective Now      05/31/21 0140                She is observed on ra w/o complications.     Ms. Marshall is positioned upright and centered in bed to accept multiple po presentations of ice chips, solid cracker, puree, and thin liquids via spoon, cup and straw.  She is able to self feed.     Facial/oral structures are symmetrical upon observation. Lingual protrusion reveals no deviation. Oral mucosa are moist, pink, and clean. Secretions are clear, thin, and controlled. OROM/EMILY is wfl to imitate oral postures. Gag is not assessed. Volitional cough is intact w/ adequate  intensity, clear in quality, non-productive. Voice is adequate in intensity, clear in quality w/ intelligible speech. She is a/a and interactive, pleasant and oriented. She follows simple directives and particpates in conversational exchanges. She is evidenced w/ a baseline dry cough, intermittent before, during, and after po presentations. Cough is clear, dry, non productive consistently. She is also  evidenced w/ an habitual throat clear across this assessment.     Upon po presentations, adequate bolus anticipation and acceptance w/ good labial seal for bolus clearance via spoon bowl, cup rim stability and suction via straw. Bolus formation, manipulation and control are wfl w/ rotary mastication pattern. A-p transit is timely w/o oral residue. No overt s/s aspiration before the swallow.     Pharyngeal swallow is timely w/ adequate hyolaryngeal elevation per palpation. No overt s/s aspiration evidenced across this evaluation. No silent aspiration.    She is evidenced w/ a baseline dry cough, intermittent before, during, and after po presentations. Cough is clear, dry, non productive consistently. She is also evidenced w/ an habitual throat clear across this assessment. She reports this a premorbid baseline status w/ chronic cough. She and her daughter deny concerns of aspiration, however, both report s/s concerning for chronic NIMA, including vocal hoarseness in am, retrosternal discomfort during and post meals, restrosternal dicomfort at nigh to awaken Ms. Marshall, otitis media, post nasal drip. She reports current Rx tx Nexium daily 40mg.     Visit Dx:     ICD-10-CM ICD-9-CM   1. Sprain of right shoulder, unspecified shoulder sprain type, initial encounter  S43.401A 840.9   2. Contusion of right knee, initial encounter  S80.01XA 924.11   3. Strain of neck muscle, initial encounter  S16.1XXA 847.0   4. Acute UTI  N39.0 599.0     Patient Active Problem List   Diagnosis   • UTI (urinary tract infection)   • Dementia (CMS/[facility])   • Paroxysmal atrial fibrillation (CMS/[facility])   • COPD (chronic obstructive pulmonary disease) (CMS/[facility])   • Osteoporosis with fracture   • Diverticulosis   • GERD without esophagitis   • Type 2 diabetes mellitus, with long-term current use of insulin (CMS/[facility])     Past Medical History:   Diagnosis Date   • COPD (chronic obstructive pulmonary disease) (CMS/[facility])    • Dementia (CMS/[facility])    •  Diverticulosis    • Essential hypertension    • GERD (gastroesophageal reflux disease)    • Osteoporosis    • Paroxysmal atrial fibrillation (CMS/HCC)    • Type II diabetes mellitus (CMS/HCC)      Past Surgical History:   Procedure Laterality Date   • APPENDECTOMY     • CHOLECYSTECTOMY     • TONSILLECTOMY       EDUCATION  The patient has been educated in the following areas:   Dysphagia (Swallowing Impairment) Oral Care/Hydration.    Impression: Ms. Marshall presents w/ wfl oropharyngeal swallow w/o s/s aspiration.No s/s indicative of silent aspiration.  No odynophagia reported.    She is observed with and does report, at premorbid baseline, report s/s concerning for chronic NIMA, including vocal hoarseness in am, retrosternal discomfort during and post meals, restrosternal dicomfort at nigh to awaken Ms. Marshall, otitis media, post nasal drip. She reports current Rx tx Nexium daily 40mg. NIMA could be a contributing factor to chronic cough and habitual globus sensation.     She is felt to most benefit from continued least restrictive regular consistency po diet, thin liquids. SLP did d/w pt and her daughter NIMA dietary guidelines and positioning safety for meals. Both verbalized understanding and agreement.     SLP Recommendation and Plan    1. Continue least restrictive regular consistency po diet, thin liquids.    2. Medications whole in puree/thins. Single pill presentations only.   3. Upright and centered for all po intake.  4. Strict NIMA precautions.  5. Oral care protocol.  No further formal SLP f/u warranted at this time.    D/w Ms. Marshall and her daughter results and recommendations w/ verbal agreement.    D/w Leon BEARD, results and recommendations w/ verbal agreement.    Thank you for allowing me to participate in the care of your patient-  Diana Real M.S., CCC/SLP                                                                        Time Calculation:       Therapy Charges for Today     Code Description  Service Date Service Provider Modifiers Qty    [id number]  ST EVAL ORAL PHARYNG SWALLOW 4 5/31/2021 Diana Real, MS CCC-SLP GN 1               Diana Real MS CCC-SLP  5/31/2021    Electronically signed by Diana Real, MS CCC-SLP at 05/31/21 1502       ADL Documentation (last day)     Date/Time Transferring Toileting Bathing Dressing Eating Communication Swallowing Change in Functional Status Since Onset of Current Illness/Injury Equipment Currently Used at Home    06/01/21 0901  3 - assistive equipment and person  3 - assistive equipment and person  3 - assistive equipment and person  3 - assistive equipment and person  0 - independent  0 - understands/communicates without difficulty  0 - swallows foods/liquids without difficulty  --  --    05/31/21 2033  3 - assistive equipment and person  3 - assistive equipment and person  3 - assistive equipment and person  3 - assistive equipment and person  0 - independent  0 - understands/communicates without difficulty  0 - swallows foods/liquids without difficulty  --  --    05/31/21 1326  --  --  --  --  --  --  --  --  cane, straight;walker, rolling    05/31/21 1215  --  --  --  --  --  --  --  --  walker, rolling;cane, straight    05/31/21 0030  --  --  --  --  --  --  --  yes  cane, straight;walker, rolling;cpap;respiratory supplies;nebulizer    05/31/21 0028  --  --  --  --  --  --  --  yes  cane, straight;walker, rolling;cpap;respiratory supplies;nebulizer    05/31/21 0023  3 - assistive equipment and person  3 - assistive equipment and person  3 - assistive equipment and person  3 - assistive equipment and person  0 - independent  0 - understands/communicates without difficulty  0 - swallows foods/liquids without difficulty  --  --

## 2021-06-01 NOTE — CONSULTS
Diabetes Education    Patient Name:  Andreea Marshall  YOB: 1950  MRN: [id number]  Admit Date:  5/30/2021        The plan is nursing home placement where they will help with medication, diet, and blood glucose management. If any questions or concerns please re-consult or call. Thank you      Electronically signed by:  Radha Rodriguez RN  06/01/21 08:06 EDT

## 2021-06-01 NOTE — CONSULTS
Date of Admit: 5/30/2021  Date of Consult: 06/01/21  No ref. provider found        UTI (urinary tract infection)      Assessment      1. Frequent falls with significant postural hypotension  2. Paroxysmal atrial fibrillation with a chads vas score of 4  3. Dementia  4. Urosepsis  5. Essential hypertension  6. Morbid obesity  7. Possible obstructive sleep apnea  8. Diabetes mellitus type 2      Recommendations     1. With the postural hypotension frequent falls this is difficult situation especially with baseline hypertension, hypoxia hypertension is multifactorial probably also partly related to autonomic dysfunction related to diabetes mellitus, sedentary lifestyle, medications, would recommend elastic stockings at least thigh level and also physical therapy we will add a small dose of midodrine will monitor her blood pressure at the same time  2. Will accept a little bit of elevated blood pressure considering severity of her postural hypotension  3. Currently patient is not a good candidate for anticoagulation considering the frequency of falls if this can be controlled patient should be anticoagulated  4. Consider sleep study as an outpatient  5. Regarding atrial fibrillation currently she is in sinus rhythm, her echocardiogram with normal ejection fraction we will stop the metoprolol and starting her on sotalol to try to keep her in normal sinus rhythm monitor QTc interval        Reason for consultation: Orthostatic hypotension    Subjective       Subjective      History of Present Illness     Andreea Marshall is a 71 year female with a past medical history significant for dementia, essential hypertension, paroxysmal atrial fibrillation, COPD and diabetes mellitus type 2. Patient presented to the ED with complaints of frequent falls. Of note patient is somewhat of a poor historian given her history of dementia. Patients daughter reported that her mother has been living with her and requiring total care for about a  year and a half now. She states her mother had a history of falls in the past but has been having them more frequently. She has had 6 falls in the past 2 months. Patient states that she does have dizziness at times when she ambulates. She does have a walker that she uses at home. She denies any syncope, chest pain or shortness of breath. She has COPD and states her breathing is at her baseline. Patient is oriented to self and day but disoriented to place and year. This appears to be her baseline per daughter report. She has a history of hypertension and takes amolodipine at home. She also has a history of atrial fibrillation but likely is not anticoagulated secondary to her history of falls. Echocardiogram showed normal LV function. Noted to have significant orthostasis with hypertension while lying with systolic blood pressure int he 80's when standing.      Cardiac risk factors:diabetes mellitus, hypertension and Sedentary life style    Last Echo: 5/31/2021  · Normal left ventricular cavity size and wall thickness noted. All left ventricular wall segments contract normally  · Left ventricular ejection fraction appears to be 61 - 65%.  · Left ventricular diastolic function is consistent with (grade I) impaired relaxation.  · The aortic valve is not well visualized.  · There is mild calcification of the mitral valve posterior leaflet(s). Mild mitral valve regurgitation is present. No significant mitral valve stenosis is present.  · There is no evidence of pericardial effusion. .    Past Medical History:   Diagnosis Date   • COPD (chronic obstructive pulmonary disease) (CMS/HCC)    • Dementia (CMS/HCC)    • Diverticulosis    • Essential hypertension    • GERD (gastroesophageal reflux disease)    • Osteoporosis    • Paroxysmal atrial fibrillation (CMS/HCC)    • Type II diabetes mellitus (CMS/HCC)      Past Surgical History:   Procedure Laterality Date   • APPENDECTOMY     • CHOLECYSTECTOMY     • TONSILLECTOMY        Family History   Problem Relation Age of Onset   • Cerebral aneurysm Mother    • Heart failure Father    • Diabetes Father      Social History     Tobacco Use   • Smoking status: Never Smoker   • Smokeless tobacco: Never Used   Substance Use Topics   • Alcohol use: Never   • Drug use: Never     Medications Prior to Admission   Medication Sig Dispense Refill Last Dose   • albuterol sulfate  (90 Base) MCG/ACT inhaler Inhale 2 puffs Every 6 (Six) Hours As Needed for Wheezing.   Past Week at Unknown time   • amLODIPine (NORVASC) 5 MG tablet Take 5 mg by mouth Daily.   5/30/2021 at Unknown time   • benzonatate (TESSALON) 100 MG capsule Take 100 mg by mouth 3 (Three) Times a Day As Needed for Cough.   Past Week at Unknown time   • budesonide (PULMICORT) 180 MCG/ACT inhaler Inhale 1 puff 2 (Two) Times a Day.   5/30/2021 at Unknown time   • donepezil (ARICEPT) 5 MG tablet Take 5 mg by mouth Daily.   5/30/2021 at Unknown time   • DULoxetine (CYMBALTA) 30 MG capsule Take 30 mg by mouth Daily.   5/30/2021 at Unknown time   • esomeprazole (nexIUM) 40 MG capsule Take 40 mg by mouth Every Morning Before Breakfast.   5/30/2021 at Unknown time   • furosemide (LASIX) 20 MG tablet Take 20 mg by mouth Daily.   5/30/2021 at Unknown time   • insulin glargine (LANTUS, SEMGLEE) 100 UNIT/ML injection Inject 80 Units under the skin into the appropriate area as directed Daily.   5/30/2021 at Unknown time   • montelukast (SINGULAIR) 10 MG tablet Take 10 mg by mouth Daily.   5/30/2021 at Unknown time   • potassium chloride 10 MEQ CR tablet Take 10 mEq by mouth Daily.   5/30/2021 at Unknown time   • tiZANidine (ZANAFLEX) 2 MG half tablet Take 2 mg by mouth 2 (two) times a day.   5/30/2021 at Unknown time   • triamterene-hydrochlorothiazide (MAXZIDE-25) 37.5-25 MG per tablet Take 1 tablet by mouth Daily.   5/30/2021 at Unknown time   • ezetimibe 10 MG tablet 10 mg, simvastatin 40 MG tablet 40 mg Take 1 tablet by mouth Every Night.    5/29/2021     Allergies:  Patient has no known allergies.    Review of Systems   Unable to perform ROS: Dementia   Neurological: Positive for dizziness.       Objective       Objective      Vital Signs  Temp:  [98 °F (36.7 °C)-98.6 °F (37 °C)] 98.2 °F (36.8 °C)  Heart Rate:  [] 76  Resp:  [18-20] 20  BP: ()/() 145/73     Vital Signs (last 72 hrs)       05/29 0700  -  05/30 0659 05/30 0700  -  05/31 0659 05/31 0700  -  06/01 0659 06/01 0700  -  06/01 1511   Most Recent    Temp (°F)   98 -  99.2    98 -  98.6      98.2     98.2 (36.8)    Heart Rate   85 -  102    78 -  135    76 -  80     76    Resp   14 -  18    18 -  20      20     20    BP   144/75 -  181/82    83/56 -  192/86    132/57 -  145/73     145/73    SpO2 (%)   93 -  96    90 -  97    90 -  95     95        Body mass index is 44.89 kg/m².  Documented weights    05/30/21 1624 05/31/21 0023 05/31/21 0505 06/01/21 0324   Weight: 104 kg (230 lb) 117 kg (257 lb) 117 kg (257 lb) 115 kg (253 lb 6.4 oz)            Intake/Output Summary (Last 24 hours) at 6/1/2021 1511  Last data filed at 6/1/2021 1317  Gross per 24 hour   Intake 3068.83 ml   Output 1250 ml   Net 1818.83 ml     Physical Exam  Constitutional:       General: She is not in acute distress.     Appearance: Normal appearance. She is well-developed and normal weight.   HENT:      Head: Normocephalic and atraumatic.   Eyes:      General: Lids are normal.      Conjunctiva/sclera: Conjunctivae normal.      Pupils: Pupils are equal, round, and reactive to light.   Neck:      Vascular: No carotid bruit or JVD.   Cardiovascular:      Rate and Rhythm: Normal rate and regular rhythm.      Pulses: Normal pulses.      Heart sounds: Normal heart sounds, S1 normal and S2 normal. No murmur heard.     Pulmonary:      Effort: Pulmonary effort is normal. No respiratory distress.      Breath sounds: Normal breath sounds. No wheezing.   Abdominal:      General: Bowel sounds are normal. There is no  distension.      Palpations: Abdomen is soft. There is no hepatomegaly or splenomegaly.      Tenderness: There is no abdominal tenderness.   Musculoskeletal:         General: No swelling. Normal range of motion.      Cervical back: Normal range of motion and neck supple.      Right lower leg: No edema.      Left lower leg: No edema.   Skin:     General: Skin is warm and dry.      Coloration: Skin is not jaundiced.      Findings: No rash.   Neurological:      General: No focal deficit present.      Mental Status: She is alert. Mental status is at baseline.      Comments: Disoriented to place    Psychiatric:         Mood and Affect: Mood normal.         Speech: Speech normal.         Results review     Results Review:    I reviewed the patient's new clinical results.  Results from last 7 days   Lab Units 05/31/21  0820 05/31/21 0212   TROPONIN T ng/mL <0.010 <0.010     Results from last 7 days   Lab Units 05/31/21  0212 05/30/21  2137   WBC 10*3/mm3 13.51* 19.10*   HEMOGLOBIN g/dL 15.2 17.0*   PLATELETS 10*3/mm3 284 358     Results from last 7 days   Lab Units 05/31/21  0212 05/30/21  2137   SODIUM mmol/L 135* 136   POTASSIUM mmol/L 3.4* 3.8   CHLORIDE mmol/L 95* 93*   CO2 mmol/L 26.3 26.0   BUN mg/dL 13 13   CREATININE mg/dL 0.96 1.07*   CALCIUM mg/dL 9.2 10.1   GLUCOSE mg/dL 324* 309*   ALT (SGPT) U/L 13 17   AST (SGOT) U/L 14 17                ECG/EMG Results (last 24 hours)     Procedure Component Value Units Date/Time    ECG 12 Lead [[id number]] Collected: 05/31/21 0136     Updated: 06/01/21 1434     QT Interval 368 ms      QTC Interval 455 ms     Narrative:      Test Reason : baseline ekg, history afib  Blood Pressure :   */*   mmHG  Vent. Rate :  92 BPM     Atrial Rate :  92 BPM     P-R Int : 144 ms          QRS Dur :  86 ms      QT Int : 368 ms       P-R-T Axes :  52 -19  50 degrees     QTc Int : 455 ms    Normal sinus rhythm  Inferior infarct , age undetermined  Cannot rule out Anterior infarct , age  undetermined  Abnormal ECG  No previous ECGs available  Confirmed by Sharad Morales ([id number]) on 6/1/2021 2:34:13 PM    Referred By: ROMAN           Confirmed By: Sharad Fortuneaniyapaz    ECG 12 Lead [[id number]] Collected: 06/01/21 0051     Updated: 06/01/21 1442     QT Interval 308 ms      QTC Interval 468 ms     Narrative:      Test Reason : AFIB  Blood Pressure :   */*   mmHG  Vent. Rate : 139 BPM     Atrial Rate : 104 BPM     P-R Int :   * ms          QRS Dur :  74 ms      QT Int : 308 ms       P-R-T Axes :   *  37  68 degrees     QTc Int : 468 ms    Atrial fibrillation with rapid ventricular response  Low voltage QRS  Nonspecific ST abnormality  Abnormal ECG  When compared with ECG of 31-MAY-2021 01:36, (Unconfirmed)  Atrial fibrillation has replaced Sinus rhythm  Vent. rate has increased BY  47 BPM  Criteria for Inferior infarct are no longer present  ST now depressed in Anterior leads  Confirmed by Sharad Morales (2020) on 6/1/2021 2:42:36 PM    Referred By: ISABEL           Confirmed By: Sharad Fortuneanitacos    ECG 12 Lead [[id number]] Collected: 06/01/21 0436     Updated: 06/01/21 1443     QT Interval 366 ms      QTC Interval 447 ms     Narrative:      Test Reason : converted SR  Blood Pressure :   */*   mmHG  Vent. Rate :  90 BPM     Atrial Rate :  90 BPM     P-R Int : 160 ms          QRS Dur :  74 ms      QT Int : 366 ms       P-R-T Axes :  52  61  64 degrees     QTc Int : 447 ms    Normal sinus rhythm  Low voltage QRS  Cannot rule out Anterior infarct , age undetermined  Abnormal ECG  When compared with ECG of 01-JUN-2021 00:51, (Unconfirmed)  Sinus rhythm has replaced Atrial fibrillation  Vent. rate has decreased BY  49 BPM  Confirmed by Sharad Morales (2020) on 6/1/2021 2:43:04 PM    Referred By:            Confirmed By: Sharad Morales          Imaging Results (Last 72 Hours)     Procedure Component Value Units Date/Time    MRI Brain Without Contrast [[id number]] Collected: 06/01/21 1038      Updated: 06/01/21 1041    Narrative:      EXAM:    MR Head Without Intravenous Contrast     EXAM DATE:    6/1/2021 10:01 AM     CLINICAL HISTORY:    positional nystagmus, recent dizziness and imbalance with frequent  falls, rule out posterior circulation/cerebellar stroke that may have  not bee evident on CT; S43.401A-Unspecified sprain of right shoulder  joint, initial encounter; S80.01XA-Contusion of right knee, initial  encounter; S16.1XXA-Strain of muscle, fascia and tendon at neck level,  initial encounter; N39.0-Urinary tract infection, site     TECHNIQUE:    Magnetic resonance images of the head/brain without intravenous  contrast in multiple planes.     COMPARISON:    CT 05/30/2021     FINDINGS:    Brain:  Generalized cerebral atrophy more pronounced in the frontal  lobe regions and is moderate in extent for patient age.  Mild chronic  small vessel ischemic disease.  No acute infarct identified.  No  hemorrhage.    Ventricles:  Unremarkable.  No ventriculomegaly.    Bones/joints:  Unremarkable.    Sinuses:  Unremarkable as visualized.  No acute sinusitis.    Mastoid air cells:  Unremarkable as visualized.  No mastoid effusion.    Orbits:  Unremarkable as visualized.       Impression:      1.  No acute intracranial findings.  2.  Specifically, no acute infarct identified.  3.  Generalized cerebral atrophy with areas of more pronounced atrophy  in the frontal lobes.  4.  Mild chronic small vessel ischemic disease.     This report was finalized on 6/1/2021 10:39 AM by Dr. Jericho Piper MD.       US Carotid Bilateral [[id number]] Collected: 05/31/21 1016     Updated: 05/31/21 1021    Narrative:      EXAMINATION: US CAROTID BILATERAL-      Technique: Multiple real-time color Doppler images were acquired of  bilateral carotid arteries.     Stenosis measurements if obtained, were performed by the NASCET or  similar method.        CLINICAL INDICATION:     pre-syncope, frequent falls;  S43.401A-Unspecified sprain of  right shoulder joint, initial encounter;  S80.01XA-Contusion of right knee, initial encounter; S16.1XXA-Strain of  muscle, fascia and tendon at neck level, initial encounter;  N39.0-Urinary tract infection, site not specified     COMPARISON:    None     FINDINGS:        RIGHT:  Mild intimal thickening is noted and mild plaque. No occlusion.  RIGHT CCA PSV:138 cm/s  RIGHT ICA PSV: 72 cm/s  RIGHT ICA EDV: 9 cm/s  Right ICA/CCA Ratio: 0.5  Anterograde flow is demonstrated in RIGHT vertebral artery.     LEFT:  Mild interval thickening and mild plaque. No occlusion. Tortuosity left  ICA is noted.  LEFT CCA PSV: 126 cm/s  LEFT ICA PSV: 43 cm/s  LEFT EDV: 9 cm/s  Left ICA/CCA Ratio: 0.3  Anterograde flow is demonstrated in LEFT vertebral artery.          Impression:      1. Mild plaque left greater than right carotid systems. No occlusion.  2. No hemodynamically significant stenosis of either RIGHT or LEFT ICA.  3. Antegrade flow noted both vertebral arteries.     This report was finalized on 5/31/2021 10:19 AM by Dr. Jericho Piper MD.       XR Chest 1 View [[id number]] Collected: 05/31/21 0136     Updated: 05/31/21 0138    Narrative:      CR Chest 1 Vw    INDICATION:   Cough. Sepsis.     COMPARISON:    None available.    FINDINGS:  Single portable AP view(s) of the chest.    The heart and mediastinal contours are normal. The lungs are clear. No pneumothorax or pleural effusion.       Impression:      No acute cardiopulmonary findings.    Signer Name: Deven Rios MD   Signed: 5/31/2021 1:36 AM   Workstation Name: [facility]    [facility]    CT Abdomen Pelvis Stone Protocol [[id number]] Collected: 05/30/21 2343     Updated: 05/30/21 2345    Narrative:      CT Abdomen Pelvis WO    INDICATION:   Flank pain. Urinary tract infection. Fall today.    TECHNIQUE:   CT of the abdomen and pelvis without IV contrast. Coronal and sagittal reconstructions were obtained.  Radiation dose reduction techniques  included automated exposure control or exposure modulation based on body size. Count of known CT and cardiac nuc  med studies performed in previous 12 months: 4.     COMPARISON:   None available.    FINDINGS:  There is some motion degradation. There is mild scarring/atelectasis in the lung bases. There is atherosclerotic disease with no aortic aneurysm. Gallbladder is surgically absent. No biliary obstruction is seen. 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 No renal or ureteral stones are identified, and there is no hydronephrosis. Unenhanced solid abdominal organs are otherwise unremarkable. Urinary bladder is normal. Solid pelvic organs are normal.    Moderate stool burden in the colon may indicate constipation. There is no evidence of acute colitis. The appendix is not clearly identified, but there is no evidence of acute appendicitis. There is some gas in small bowel loops suggesting a mild ileus.  There is a diverticulum in the third portion of the duodenum. No free fluid or adenopathy is identified. No acute fractures are seen.      Impression:        1. No renal or ureteral stones. No hydronephrosis.  2. Moderate stool burden in the colon may indicate constipation.  3. Mild small bowel ileus suspected. No bowel obstruction.  4. Cholecystectomy without biliary obstruction.      Signer Name: Deven Rios MD   Signed: 5/30/2021 11:43 PM   Workstation Name: [email or web address]    [facility]    CT Head Without Contrast [[id number]] Collected: 05/30/21 2302     Updated: 05/30/21 2304    Narrative:      CT Head WO    HISTORY:   Generalized weakness today. Fall today.    TECHNIQUE:   Routine noncontrast head CT. Coronal and sagittal reformatted images. Radiation dose reduction techniques included automated exposure control or exposure modulation based on body size. Count of known CT and cardiac nuc med studies performed in previous  12 months: 0.      COMPARISON:   None.    FINDINGS:   No hemorrhage, acute infarction, mass lesion, or abnormal extra-axial fluid collection. No midline shift or focal mass effect. Ventricular system is normal in size and configuration. Moderate generalized atrophy and chronic small vessel disease  throughout the supratentorial white matter. No acute osseous abnormality. Visualized paranasal sinuses are clear. Visualized mastoid air cells are clear.      Impression:      No acute intracranial abnormality. Age-related senescent changes.          Signer Name: Venu Roque MD   Signed: 5/30/2021 11:02 PM   Workstation Name: [email or web address]    [facility]    CT Upper Extremity Right Without Contrast [[id number]] Collected: 05/30/21 1947     Updated: 05/30/21 1949    Narrative:      CT UE RT WO    INDICATION:    Right shoulder pain status post fall today.    TECHNIQUE:   CT of the right shoulder without IV contrast. Coronal and sagittal reconstructions were obtained.  Radiation dose reduction techniques included automated exposure control or exposure modulation based on body size. Count of known CT and cardiac nuc med  studies performed in previous 12 months: 0.     COMPARISON:    Right shoulder x-rays performed the same date.    FINDINGS:  There is no evidence of acute fracture or dislocation of the right shoulder. There is chronic flattening of the right humeral head, likely secondary to remote posttraumatic deformity. Mild degenerative change of the glenohumeral joint. Moderate  degenerative change of the acromioclavicular joint. No glenohumeral effusion. Periarticular soft tissues are unremarkable. Included lung fields are clear.      Impression:        1. No acute fracture or dislocation of the right shoulder.  2. Chronic flattening of the right humeral head, likely secondary to remote posttraumatic deformity. Mild glenohumeral arthrosis.  3. Moderate AC joint arthrosis.    Signer Name: Venu Roque MD    Signed: 5/30/2021 7:47 PM   Workstation Name: [facility]    [facility]    XR Ankle 3+ View Right [[id number]] Collected: 05/30/21 1812     Updated: 05/30/21 1814    Narrative:      CR Ankle Min 3 Vws RT    INDICATION:   Right ankle pain status post fall today.    COMPARISON:   None.    FINDINGS:  3 view(s) of the right ankle.  No fracture or dislocation. Prominent plantar calcaneal spur. Ossification at the Achilles tendon insertion. Mild degenerative changes throughout the midfoot. No bone erosion or destruction. No foreign body.      Impression:      No acute fracture or dislocation.    Signer Name: Venu Roque MD   Signed: 5/30/2021 6:12 PM   Workstation Name: [facility]    [facility]    XR Knee 4+ View Right [[id number]] Collected: 05/30/21 1810     Updated: 05/30/21 1812    Narrative:      CR Knee Min 4 Vws RT    INDICATION:   Anterior right knee pain status post fall today.    COMPARISON:   None available.    FINDINGS:  4 view(s) of the right knee.  No acute fracture or dislocation. Small joint effusion. Moderate degenerative changes in all 3 compartments. No bone erosion or destruction.  No foreign body.      Impression:      Moderate tricompartmental arthrosis. Small joint effusion. No acute fracture or dislocation.    Signer Name: Venu Roque MD   Signed: 5/30/2021 6:10 PM   Workstation Name: [facility]    [facility]    XR Shoulder 2+ View Right [[id number]] Collected: 05/30/21 1809     Updated: 05/30/21 1811    Narrative:      CR Shoulder Comp Min 2 Vws RT    INDICATION:   Right shoulder pain status post fall today.    COMPARISON:   None available.    FINDINGS:   3 views of the right shoulder.  Slightly flattened appearance of the humeral head of indeterminate age. This may also be slightly projectional. However, a humeral head fracture cannot be excluded. No glenohumeral dislocation. Moderate degenerative  changes of the  acromioclavicular joint.  No foreign body.      Impression:      Slightly flattened appearance of the humeral head, indeterminate age. This may be chronic in nature and somewhat due to projectional artifact. However, a humeral head fracture cannot be excluded on the exam. No glenohumeral dislocation. CT of the right  shoulder may be considered if further characterization is warranted.    Signer Name: Venu Roque MD   Signed: 5/30/2021 6:09 PM   Workstation Name: [facility]    [facility]    CT Lumbar Spine Without Contrast [[id number]] Collected: 05/30/21 1725     Updated: 05/30/21 1728    Narrative:      CT Spine Lumbar WO    INDICATION:    Back pain status post fall today.    TECHNIQUE:   CT of the lumbar spine without IV contrast. Coronal and sagittal reconstructions were obtained.  Radiation dose reduction techniques included automated exposure control or exposure modulation based on body size. Count of known CT and cardiac nuc med  studies performed in previous 12 months: 0.     COMPARISON:    None available.    FINDINGS:  No acute fractures. Lumbar vertebral body heights and alignment are adequately maintained. Mild to moderate multilevel discogenic degenerative changes, most prominent at L2-3. Moderate multilevel facet degeneration. Mild central canal stenosis at  multiple levels. Paravertebral soft tissues are unremarkable. Included sacrum and SI joints are intact.      Impression:        1. No acute lumbar spine injury.  2. Multilevel degenerative changes, detailed above.    Signer Name: Venu Roque MD   Signed: 5/30/2021 5:25 PM   Workstation Name: [email or web address]    [facility]    CT Cervical Spine Without Contrast [[id number]] Collected: 05/30/21 1710     Updated: 05/30/21 1712    Narrative:      CT Spine Cervical WO    INDICATION:   Neck pain status post fall today.    TECHNIQUE:   CT of the cervical spine without IV contrast. Coronal and sagittal  reconstructions were obtained.  Radiation dose reduction techniques included automated exposure control or exposure modulation based on body size. Count of known CT and cardiac nuc med  studies performed in previous 12 months: 0.     COMPARISON:  None available.    FINDINGS:  No acute fracture. Vertebral body heights and alignment are normally maintained. Atlantoaxial relationship and cervicothoracic junction are within normal limits. Mild to moderate multilevel degenerative disc changes and facet arthropathy. Moderate  central canal stenosis at C5-6. Paravertebral soft tissues are unremarkable. Included lung fields are clear.      Impression:        1. No acute cervical spine injury.  2. Multilevel degenerative changes, detailed above.    Signer Name: Venu Roque MD   Signed: 5/30/2021 5:10 PM   Workstation Name: [email or web address]    [facility]          I have discussed my impression and recommendations with the patient and family.    Thank you very much for asking us to be involved in this patient's care.  We will follow along with you.      Electronically signed by PAT Tapia, 06/01/21, 3:56 PM EDT.  Electronically signed by Annalisa Cates MD, 06/01/21, 4:15 PM EDT.  Please note that portions of this note were completed with a voice recognition program.

## 2021-06-01 NOTE — PROGRESS NOTES
[facility] HOSPITALIST PROGRESS NOTE     Patient Identification:  Name:  Andreea Marshall  Age:  71 y.o.  Sex:  female  :  1950  MRN:  [id number]  Visit Number:  [id number]  Primary Care Provider:  Provider, No Known    Length of stay:  2    Chief complaint: None    Subjective:    Patient was sleeping when I entered the room but was easily awakened.  Patient states she is doing well today and has no specific complaints.  However, note is made of significant orthostasis as patient has blood pressure in the 150s while lying flat and drops to systolic in the upper 80s/low 90s when standing.  Discussed with patient this is likely the cause of her multiple falls at home.  ----------------------------------------------------------------------------------------------------------------------  [facility] Meds:  amLODIPine, 5 mg, Oral, Daily  budesonide, 0.5 mg, Nebulization, BID - RT  cefTRIAXone, 1 g, Intravenous, Q24H  docusate sodium, 100 mg, Oral, BID  donepezil, 5 mg, Oral, Daily  DULoxetine, 30 mg, Oral, Daily  furosemide, 20 mg, Oral, Daily  heparin (porcine), 5,000 Units, Subcutaneous, Q12H  insulin aspart, 0-9 Units, Subcutaneous, TID AC  insulin detemir, 40 Units, Subcutaneous, Daily  lidocaine, 1 patch, Transdermal, Q24H  montelukast, 10 mg, Oral, Daily  pantoprazole, 40 mg, Oral, QAM  potassium chloride, 10 mEq, Oral, Daily  sodium chloride, 10 mL, Intravenous, Q12H      sodium chloride, 75 mL/hr, Last Rate: 75 mL/hr (21 0102)      ----------------------------------------------------------------------------------------------------------------------  Vital Signs:  Temp:  [98 °F (36.7 °C)-98.6 °F (37 °C)] 98.2 °F (36.8 °C)  Heart Rate:  [] 76  Resp:  [18-20] 20  BP: ()/() 145/73      21  0023 21  0505 21  0324   Weight: 117 kg (257 lb) 117 kg (257 lb) (admit weight pt not on floor 24 hrs) 115 kg (253 lb 6.4 oz)     Body mass index is 44.89  kg/m².    Intake/Output Summary (Last 24 hours) at 6/1/2021 1448  Last data filed at 6/1/2021 1317  Gross per 24 hour   Intake 3068.83 ml   Output 1250 ml   Net 1818.83 ml     Diet Regular; Consistent Carbohydrate, Renal, Cardiac  ----------------------------------------------------------------------------------------------------------------------  Physical exam:  Constitutional: Morbidly obese  female in no apparent distress.     HENT:  Head:  Normocephalic and atraumatic.  Mouth:  Moist mucous membranes.    Eyes:  Conjunctivae and EOM are normal.  Pupils are equal, round, and reactive to light.  No scleral icterus.    Neck:  Neck supple. No thyromegaly.  No JVD present.    Cardiovascular:  Regular rate and rhythm with no murmurs, rubs, clicks or gallops appreciated.  Pulmonary/Chest:  Clear to auscultation bilaterally with no crackles, wheezes or rhonchi appreciated.  Abdominal:  Soft. Nontender. Nondistended  Bowel sounds are normal in all four quadrants. No organomegally appreciated.   Musculoskeletal:   1+ pitting edema bilateral lower extremities, right lower extremity wrapped in Ace bandage.  Neurological:  Alert and oriented to person, place, and time. Cranial nerves II-XII intact with no focal deficits.  No facial droop.  No slurred speech.   Skin:  Warm and dry to palpation with no rashes or lesions appreciated.  Peripheral vascular:  2+ radial and pedal pulses in bilateral upper and lower extremities.  Psychiatric:  Alert and oriented x3, demonstrates appropriate judgement and insight.  ----------------------------------------------------------------------------------------------------------------------  Tele:    ----------------------------------------------------------------------------------------------------------------------  Results from last 7 days   Lab Units 05/31/21  0820 05/31/21 0212   TROPONIN T ng/mL <0.010 <0.010     Results from last 7 days   Lab Units 05/31/21 0212  05/30/21 2230 05/30/21 2137   CRP mg/dL 1.45*  --  1.30*   LACTATE mmol/L  --  1.9  --    WBC 10*3/mm3 13.51*  --  19.10*   HEMOGLOBIN g/dL 15.2  --  17.0*   HEMATOCRIT % 46.5  --  52.5*   MCV fL 91.9  --  91.5   MCHC g/dL 32.7  --  32.4   PLATELETS 10*3/mm3 284  --  358     Results from last 7 days   Lab Units 05/30/21  2303   PH, ARTERIAL pH units 7.487*   PO2 ART mm Hg 69.2*   PCO2, ARTERIAL mm Hg 40.6   HCO3 ART mmol/L 30.6*     Results from last 7 days   Lab Units 05/31/21 0212 05/30/21 2137   SODIUM mmol/L 135* 136   POTASSIUM mmol/L 3.4* 3.8   CHLORIDE mmol/L 95* 93*   CO2 mmol/L 26.3 26.0   BUN mg/dL 13 13   CREATININE mg/dL 0.96 1.07*   EGFR IF NONAFRICN AM mL/min/1.73 57* 50*   CALCIUM mg/dL 9.2 10.1   GLUCOSE mg/dL 324* 309*   ALBUMIN g/dL 3.78 4.17   BILIRUBIN mg/dL 0.9 1.0   ALK PHOS U/L 118* 133*   AST (SGOT) U/L 14 17   ALT (SGPT) U/L 13 17   Estimated Creatinine Clearance: 63.8 mL/min (by C-G formula based on SCr of 0.96 mg/dL).    No results found for: AMMONIA      No results found for: BLOODCX  Urine Culture   Date Value Ref Range Status   05/30/2021 >100,000 CFU/mL Gram Negative Bacilli (A)  Preliminary     No results found for: WOUNDCX  No results found for: STOOLCX    I have personally looked at the labs and they are summarized above.  ----------------------------------------------------------------------------------------------------------------------  Imaging Results (Last 24 Hours)     Procedure Component Value Units Date/Time    MRI Brain Without Contrast [[id number]] Collected: 06/01/21 1038     Updated: 06/01/21 1041    Narrative:      EXAM:    MR Head Without Intravenous Contrast     EXAM DATE:    6/1/2021 10:01 AM     CLINICAL HISTORY:    positional nystagmus, recent dizziness and imbalance with frequent  falls, rule out posterior circulation/cerebellar stroke that may have  not bee evident on CT; S43.401A-Unspecified sprain of right shoulder  joint, initial encounter;  S80.01XA-Contusion of right knee, initial  encounter; S16.1XXA-Strain of muscle, fascia and tendon at neck level,  initial encounter; N39.0-Urinary tract infection, site     TECHNIQUE:    Magnetic resonance images of the head/brain without intravenous  contrast in multiple planes.     COMPARISON:    CT 05/30/2021     FINDINGS:    Brain:  Generalized cerebral atrophy more pronounced in the frontal  lobe regions and is moderate in extent for patient age.  Mild chronic  small vessel ischemic disease.  No acute infarct identified.  No  hemorrhage.    Ventricles:  Unremarkable.  No ventriculomegaly.    Bones/joints:  Unremarkable.    Sinuses:  Unremarkable as visualized.  No acute sinusitis.    Mastoid air cells:  Unremarkable as visualized.  No mastoid effusion.    Orbits:  Unremarkable as visualized.       Impression:      1.  No acute intracranial findings.  2.  Specifically, no acute infarct identified.  3.  Generalized cerebral atrophy with areas of more pronounced atrophy  in the frontal lobes.  4.  Mild chronic small vessel ischemic disease.     This report was finalized on 6/1/2021 10:39 AM by Dr. Jericho Piper MD.           ----------------------------------------------------------------------------------------------------------------------  Assessment and Plan:    1.  Sepsis -present on admission, continue to treat underlying etiology which is felt to be secondary to acute cystitis    2.  Acute cystitis -urine culture with greater than 100,000 CFU of pansensitive E. coli, will continue Rocephin 1 g IV daily.    3.  Frequent falls -likely secondary to significant orthostatic hypotension.  Will consult cardiology for further recommendations as patient has resting blood pressure which is hypertensive.  MRI of brain is within normal limits.    4.  Insulin-dependent type 2 diabetes mellitus -continue Accu-Cheks before every meal and nightly with sliding scale insulin    5.  General debility -continue PT/OT, will  likely require placement on discharge      Disposition Case management consulted for assistance with placement.  Will likely be stable for discharge in the next 24 to 48 hours.        Rocky Méndez,   06/01/21  14:48 EDT

## 2021-06-01 NOTE — DISCHARGE PLACEMENT REQUEST
"MarshallIvan shah (71 y.o. Female)     Date of Birth Social Security Number Address Home Phone MRN    1950  [address] [place] [address] [phone] [id number]    Scientologist Marital Status          Unknown        Admission Date Admission Type Admitting Provider Attending Provider Department, Room/Bed    5/30/21 Emergency Frankie Wood MD Mullins, Thomas Anthony, DO [address], 3319/1P    Discharge Date Discharge Disposition Discharge Destination                       Attending Provider: Rocky Méndez DO    Allergies: No Known Allergies    Isolation: None   Infection: None   Code Status: No CPR    Ht: 160 cm (63\")   Wt: 115 kg (253 lb 6.4 oz)    Admission Cmt: None   Principal Problem: UTI (urinary tract infection) [N39.0]                 Active Insurance as of 5/30/2021     Primary Coverage     Payor Plan Insurance Group Employer/Plan Group    MEDICARE MEDICARE A & B      Payor Plan Address Payor Plan Phone Number Payor Plan Fax Number Effective Dates    [address] [phone]  10/1/2001 - None Entered    [facility] [id number]       Subscriber Name Subscriber Birth Date Member ID       IVAN MARSHALL 1950 [id number]           Secondary Coverage     Payor Plan Insurance Group Employer/Plan Group    [facility] [id number]     Payor Plan Address Payor Plan Phone Number Payor Plan Fax Number Effective Dates    [address]   1/1/2021 - None Entered    [facility] [id number]       Subscriber Name Subscriber Birth Date Member ID       IVAN MARSHALL 1950 [id number]                 Emergency Contacts      (Rel.) Home Phone Work Phone Mobile Phone    JS CARLOS (Daughter) [phone] -- --            Emergency Contact Information     Name Relation Home Work Mobile    JS CARLOS Daughter [phone]            Insurance Information                MEDICARE/MEDICARE A & B Phone: [phone]    Subscriber: Ivan Marshall " Subscriber#: [id number]    Group#:  Precert#:         [facility] Phone:     Subscriber: Andreea Marshall Subscriber#: [id number]    Group#: [id number] Precert#:           Treatment Team  Chat With All Active Members    Provider Relationship Specialty Contact    Rocky Méndez DO  Attending --  [phone]    Damaris Melgar PCT  Patient Care Technician --     Frankie Wood MD  Consulting Physician Hospitalist  [phone]    Rudy Umana, RN  Registered Nurse --  [phone]    Kiki Harris, RRT  Respiratory Therapist --  [phone]          Problem List         Codes Noted - Resolved       Hospital    * (Principal) UTI (urinary tract infection) ICD-10-CM: N39.0  ICD-9-CM: 599.0 5/30/2021 - Present       Non-Hospital    Dementia (CMS/HCC) (Chronic) ICD-10-CM: F03.90  ICD-9-CM: 294.20 5/31/2021 - Present    Paroxysmal atrial fibrillation (CMS/HCC) (Chronic) ICD-10-CM: I48.0  ICD-9-CM: 427.31 5/31/2021 - Present    COPD (chronic obstructive pulmonary disease) (CMS/HCC) (Chronic) ICD-10-CM: J44.9  ICD-9-CM: 496 5/31/2021 - Present    Osteoporosis with fracture (Chronic) ICD-10-CM: M80.80XA  ICD-9-CM: 733.10, 733.00 5/31/2021 - Present    Diverticulosis (Chronic) ICD-10-CM: K57.90  ICD-9-CM: 562.10 5/31/2021 - Present    GERD without esophagitis (Chronic) ICD-10-CM: K21.9  ICD-9-CM: 530.81 5/31/2021 - Present    Type 2 diabetes mellitus, with long-term current use of insulin (CMS/HCC) (Chronic) ICD-10-CM: E11.9, Z79.4  ICD-9-CM: 250.00, V58.67 5/31/2021 - Present             History & Physical      Kat Villafuerte PA-C at 05/30/21 2350     Attestation signed by Frankie Wood MD at 05/31/21 9066    I have seen and examined the patient independently from Kat Villafuerte PA-C, and have reviewed Kat's findings, assessment and plan in the H&P below. Patient and daughter at bedside reiterate recent history of frequent falls, some of which are associated with  dizziness and loss of balance. No urinary symptoms except foul smelling urine per daughter. Patient does technically meet sepsis criteria, felt to be most likely secondary to UTI for which rocephin has been ordered. Follow up urine and blood cultures. Underlying infection could certainly be contributing to dizziness and frequent falls. However, also has afib and unfortunately is not anticoagulated despite a high [id number] score because of developing vaginal bleeding while on anticoagulation in the past. Therefore at high risk of stroke. Nothing seen on CT head, but she had reported nystagmus with change in position in bed while checking orthostatic vitals so could have posterior circulation/cerebellar embolic stroke not seen on CT. Alternatively could have BPPV. Will evaluate further with MRI brain. Otherwise agree with pre-syncope workup listed below including monitor for arrhythmias on telemetry, trending troponin, and evaluating further with ECHO and carotid doppler in the morning. For diabetes, glucose glucose is currently 309. Patient takes lantus every morning. Will give some correctional novolog now and start SSI in the morning. Wlil start levemir in place of lantus once home meds are reconciled. Ultimately daughter is requesting SNF placement so PT and OT will evaluate and  will begin working on placement.                       [facility] Medicine Services  History & Physical    Patient Identification:  Name:  Andreea Marshall  Age:  71 y.o.  Sex:  female  :  1950  MRN:  [id number]   Visit Number:  [id number]  Primary Care Physician:  Provider, No Known    Subjective     2021   Chief complaint: Frequent falls    History of presenting illness:      Andreea Marshall is a 71 y.o. female with past medical history significant for dementia, essential hypertension, paroxysmal atrial fibrillation, COPD, osteoporosis, diverticulosis, GERD, and insulin-dependent type II  diabetes mellitus.    Patient presents to [facility] Emergency Department due to more frequent falls. Patient's daughter, Dulce Horta, is present at bedside to assist in providing the history and physical. She reports that her mother has been living with her for about a year and a half and is requiring total care with her activities of daily living. She states that her mother does have a history of falls over the past few years, but has been falling more frequently over the past few months. She states that she has fallen a total of six times over the past two months. She reports that sometimes it is due to her being very unsteady on her feet and sometimes it is due to her getting dizzy. She has a walker, cane, and wheelchair that she uses at home, but only uses these devices as needed. She states that today is the worst fall that her mother has sustained. The patient states that she was ambulating to the bathroom and started to experience some dizziness and fell. She did sustain injuries to her right leg including her knee and ankle and has a small abrasion just under her nose. She denies having a syncopal episode or experiencing chest pain prior to her fall. She reports some shortness of breath, but no worse than usual due to her underlying COPD. She has had a cough that is nonproductive that has worsened over the past few days. She also reports a headache. Denies nasal drainage, sore throat, abdominal pain, nausea, vomiting, or diarrhea. Patient is alert and oriented to person, time, date of birth, and who the president is. She is disoriented to place. The daughter reports this is her baseline orientation status. Daughter states that she is having a hard time caring for her mother as she has a full-time job and her mother is growing weaker and requiring assistance. I did discuss with her the possibility of placement at a skilled nursing facility and she is agreeable to this.     Upon arrival to the ED,  vital signs were temperaure 98.1, heart rate 85, respirations 14, /82, SpO2 94% on room air. ABG shows pH 7.487, pCO2 40.6, pO2 69.2, HCO3 30.6 with oxygen saturation of 95 on room air. CMP shows glucose 309, chloride 93, anion gap 17, creatinine 1.07, eGFR 50, alkaline phosphatase 133, otherwise within normal limits. CBC shows WBC 19.10, RBC 5.74, hemoglobin 17, hematocrit 52.5, otherwise within normal limits. UA shows trace ketones, positive nitrites, 1+ leukocytes, 2+ protein, 6-12 RBC, too numerous to count WBC, 4+ bacteria, and 3-6 squamous epithelial cells. C-RP 1.3. Lactate 1.9. Negative acetone. Pending blood cultures x 2 and urine culture. X-ray right shoulder shows slightly flattening appearance of the humeral head which may be chronic in nature and somewhat due to projectional artifact but fracture cannot be excluded and no dislocation. X-ray right knee shows moderate tricompartmental arthrosis with small joint effusion without fracture dislocation. X-ray of right ankle shows no acute fracture dislocation. CT head shows no acute intracranial abnormality. CT cervical spine shows no acute cervical spine injury with multilevel degenerative changes. CT lumbar spine shows no acute injury with multilevel degenerative changes. CT right upper extremity shows no acute fracture dislocation of the right shoulder; chronic flattening of the right humeral head likely secondary to remote posttraumatic deformity with mild glenohumeral arthrosis; moderate AC joint arthrosis. CT abdomen pelvis shows no renal or ureteral stones and no hydronephrosis; moderate stool burden and mild small bowel ileus suspected without obstruction.    Known Emergency Department medications received prior to my evaluation included IV Rocephin 1 g. Emergency Department Room location at the time of my evaluation was 408.     ---------------------------------------------------------------------------------------------------------------------    Review of Systems   Constitutional: Positive for activity change (worsening debility). Negative for chills, fatigue and fever.   HENT: Negative for congestion, postnasal drip, rhinorrhea, sneezing and sore throat.    Eyes: Negative for discharge and redness.   Respiratory: Positive for cough (nonproductive) and shortness of breath (at baseline). Negative for apnea and wheezing.    Cardiovascular: Positive for leg swelling (chronic;baseline). Negative for chest pain and palpitations.   Gastrointestinal: Negative for abdominal distention, abdominal pain, diarrhea, nausea and vomiting.   Endocrine: Negative for polydipsia, polyphagia and polyuria.   Genitourinary: Negative for difficulty urinating, dysuria, frequency, hematuria and urgency.        Reports foul-smelling urine   Musculoskeletal: Positive for arthralgias (right ankle/knee) and gait problem. Negative for myalgias.   Skin: Negative for color change, pallor and wound.   Neurological: Positive for dizziness, weakness and headaches. Negative for seizures and syncope.   Psychiatric/Behavioral: Negative for agitation and behavioral problems. The patient is not nervous/anxious.         ---------------------------------------------------------------------------------------------------------------------   Past Medical History:   Diagnosis Date   • COPD (chronic obstructive pulmonary disease) (CMS/[facility])    • Dementia (CMS/[facility])    • Diverticulosis    • Essential hypertension    • GERD (gastroesophageal reflux disease)    • Osteoporosis    • Paroxysmal atrial fibrillation (CMS/[facility])    • Type II diabetes mellitus (CMS/[facility])      Past Surgical History:   Procedure Laterality Date   • APPENDECTOMY     • CHOLECYSTECTOMY     • TONSILLECTOMY       Family History   Problem Relation Age of Onset   • Cerebral aneurysm Mother    • Heart failure Father    • Diabetes Father      Social History     Socioeconomic History   • Marital status:      Spouse name: Not on file   •  Number of children: Not on file   • Years of education: Not on file   • Highest education level: Not on file   Tobacco Use   • Smoking status: Never Smoker   • Smokeless tobacco: Never Used   Substance and Sexual Activity   • Alcohol use: Never   • Drug use: Never   • Sexual activity: Defer     ---------------------------------------------------------------------------------------------------------------------   Allergies:  Patient has no known allergies.  ---------------------------------------------------------------------------------------------------------------------   Home medications:    Medications below are reported home medications pulling from within the system; at this time, these medications have not been reconciled unless otherwise specified and are in the verification process for further verifcation as current home medications.  Medications Prior to Admission   Medication Sig Dispense Refill Last Dose   • budesonide-formoterol (SYMBICORT) 80-4.5 MCG/ACT inhaler Inhale 2 puffs 2 (Two) Times a Day.   5/30/2021 at Unknown time   • donepezil (ARICEPT) 5 MG tablet Take 5 mg by mouth Daily.   5/30/2021 at Unknown time   • DULoxetine (CYMBALTA) 30 MG capsule Take 30 mg by mouth Daily.   5/30/2021 at Unknown time   • esomeprazole (nexIUM) 40 MG capsule Take 40 mg by mouth Every Morning Before Breakfast.   5/30/2021 at Unknown time   • ezetimibe 10 MG tablet 10 mg, simvastatin 40 MG tablet 40 mg Take 1 tablet by mouth Daily.   5/30/2021 at Unknown time   • furosemide (LASIX) 10 MG half tablet Take 10 mg by mouth Daily.   5/30/2021 at Unknown time   • insulin glargine (LANTUS, SEMGLEE) 100 UNIT/ML injection Inject 80 Units under the skin into the appropriate area as directed Daily.   5/30/2021 at Unknown time   • losartan (COZAAR) 50 MG tablet Take 50 mg by mouth Daily.   5/30/2021 at Unknown time   • montelukast (SINGULAIR) 10 MG tablet Take 10 mg by mouth Daily.   5/30/2021 at Unknown time   • potassium  chloride 10 MEQ CR tablet Take 10 mEq by mouth Daily.   5/30/2021 at Unknown time   • tiZANidine (ZANAFLEX) 2 MG half tablet Take 2 mg by mouth Daily.   5/30/2021 at Unknown time   • traZODone (DESYREL) 100 MG tablet Take 100 mg by mouth Every Night.   5/29/2021 at Unknown time   • triamterene-hydrochlorothiazide (MAXZIDE-25) 37.5-25 MG per tablet Take 1 tablet by mouth Daily.   5/30/2021 at Unknown time   • albuterol sulfate  (90 Base) MCG/ACT inhaler Inhale 2 puffs Every 6 (Six) Hours As Needed for Wheezing.   Unknown at Unknown time   • benzonatate (TESSALON) 100 MG capsule Take 100 mg by mouth 3 (Three) Times a Day As Needed for Cough.   Unknown at Unknown time       Hospital Scheduled Meds:  cefTRIAXone, 1 g, Intravenous, Q24H  docusate sodium, 100 mg, Oral, BID  heparin (porcine), 5,000 Units, Subcutaneous, Q12H  insulin aspart, 0-9 Units, Subcutaneous, TID AC  sodium chloride, 10 mL, Intravenous, Q12H      sodium chloride, 75 mL/hr, Last Rate: 75 mL/hr (05/31/21 0056)        Current listed hospital scheduled medications may not yet reflect those currently placed in orders that are signed and held awaiting patient's arrival to floor.   ---------------------------------------------------------------------------------------------------------------------     Objective     Vital Signs:  Temp:  [98.1 °F (36.7 °C)-99.2 °F (37.3 °C)] 99.2 °F (37.3 °C)  Heart Rate:  [] 102  Resp:  [14-18] 18  BP: (144-181)/() 163/69      05/30/21  1624 05/31/21  0023   Weight: 104 kg (230 lb) 117 kg (257 lb)     Body mass index is 45.53 kg/m².  ---------------------------------------------------------------------------------------------------------------------       Physical Exam  Constitutional:       Appearance: Normal appearance. She is obese.      Comments: Patient is resting in bed at the time of my examination. She is in no acute distress.    HENT:      Head: Normocephalic and atraumatic.      Right Ear:  External ear normal.      Left Ear: External ear normal.      Nose: Nose normal.      Mouth/Throat:      Mouth: Mucous membranes are moist.      Pharynx: Oropharynx is clear.   Eyes:      Extraocular Movements: Extraocular movements intact.      Conjunctiva/sclera: Conjunctivae normal.      Pupils: Pupils are equal, round, and reactive to light.   Cardiovascular:      Rate and Rhythm: Normal rate and regular rhythm.      Pulses: Normal pulses.      Heart sounds: Normal heart sounds. No murmur heard.   No friction rub. No gallop.    Pulmonary:      Effort: Pulmonary effort is normal. No respiratory distress.      Breath sounds: Normal breath sounds. No wheezing, rhonchi or rales.   Abdominal:      General: Abdomen is flat. Bowel sounds are normal. There is no distension.      Palpations: Abdomen is soft.      Tenderness: There is no abdominal tenderness. There is no guarding.   Musculoskeletal:         General: Swelling present. Normal range of motion.      Cervical back: Normal range of motion and neck supple.      Right lower leg: Edema present.      Left lower leg: Edema present.        Legs:    Skin:     General: Skin is warm and dry.      Findings: No erythema or rash.          Neurological:      General: No focal deficit present.      Mental Status: She is alert. Mental status is at baseline. She is disoriented.      Comments: Patient is alert and oriented to person, time, date of birth, and president. She is disoriented to place. Daughter reports this is her baseline orientation. No focal deficits appreciated. Facial features symmetrical. No tongue deviation.  strength 5/5 bilaterally. No upper extremity drift. Lower extremity strength 5/5 bilaterally.    Psychiatric:         Mood and Affect: Mood normal.         Behavior: Behavior normal.         Thought Content: Thought content normal.          ---------------------------------------------------------------------------------------------------------------------  EKG:    Will obtain baseline EKG.         Telemetry:    Telemetry shows normal sinus rhythm with HR 95 and SpO2 95% on room air.     I have personally looked at both the EKG and the telemetry strips.  ---------------------------------------------------------------------------------------------------------------------   Results from last 7 days   Lab Units 05/30/21 2230 05/30/21 2137   CRP mg/dL  --  1.30*   LACTATE mmol/L 1.9  --    WBC 10*3/mm3  --  19.10*   HEMOGLOBIN g/dL  --  17.0*   HEMATOCRIT %  --  52.5*   MCV fL  --  91.5   MCHC g/dL  --  32.4   PLATELETS 10*3/mm3  --  358     Results from last 7 days   Lab Units 05/30/21  2303   PH, ARTERIAL pH units 7.487*   PO2 ART mm Hg 69.2*   PCO2, ARTERIAL mm Hg 40.6   HCO3 ART mmol/L 30.6*     Results from last 7 days   Lab Units 05/30/21 2137   SODIUM mmol/L 136   POTASSIUM mmol/L 3.8   CHLORIDE mmol/L 93*   CO2 mmol/L 26.0   BUN mg/dL 13   CREATININE mg/dL 1.07*   EGFR IF NONAFRICN AM mL/min/1.73 50*   CALCIUM mg/dL 10.1   GLUCOSE mg/dL 309*   ALBUMIN g/dL 4.17   BILIRUBIN mg/dL 1.0   ALK PHOS U/L 133*   AST (SGOT) U/L 17   ALT (SGPT) U/L 17   Estimated Creatinine Clearance: 57.8 mL/min (A) (by C-G formula based on SCr of 1.07 mg/dL (H)).  No results found for: AMMONIA          No results found for: HGBA1C  No results found for: TSH, FREET4  No results found for: PREGTESTUR, PREGSERUM, HCG, HCGQUANT  Pain Management Panel    There is no flowsheet data to display.           ---------------------------------------------------------------------------------------------------------------------  Imaging Results (Last 7 Days)     Procedure Component Value Units Date/Time    CT Abdomen Pelvis Stone Protocol [[id number]] Collected: 05/30/21 2343     Updated: 05/30/21 2345    Narrative:      CT Abdomen Pelvis WO    INDICATION:   Flank pain. Urinary  tract infection. Fall today.    TECHNIQUE:   CT of the abdomen and pelvis without IV contrast. Coronal and sagittal reconstructions were obtained.  Radiation dose reduction techniques included automated exposure control or exposure modulation based on body size. Count of known CT and cardiac nuc  med studies performed in previous 12 months: 4.     COMPARISON:   None available.    FINDINGS:  There is some motion degradation. There is mild scarring/atelectasis in the lung bases. There is atherosclerotic disease with no aortic aneurysm. Gallbladder is surgically absent. No biliary obstruction is seen. 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 No renal or ureteral stones are identified, and there is no hydronephrosis. Unenhanced solid abdominal organs are otherwise unremarkable. Urinary bladder is normal. Solid pelvic organs are normal.    Moderate stool burden in the colon may indicate constipation. There is no evidence of acute colitis. The appendix is not clearly identified, but there is no evidence of acute appendicitis. There is some gas in small bowel loops suggesting a mild ileus.  There is a diverticulum in the third portion of the duodenum. No free fluid or adenopathy is identified. No acute fractures are seen.      Impression:        1. No renal or ureteral stones. No hydronephrosis.  2. Moderate stool burden in the colon may indicate constipation.  3. Mild small bowel ileus suspected. No bowel obstruction.  4. Cholecystectomy without biliary obstruction.      Signer Name: Deven Rios MD   Signed: 5/30/2021 11:43 PM   Workstation Name: [email or web address]    [facility]    CT Head Without Contrast [[id number]] Collected: 05/30/21 2302     Updated: 05/30/21 2304    Narrative:      CT Head WO    HISTORY:   Generalized weakness today. Fall today.    TECHNIQUE:   Routine noncontrast head CT. Coronal and sagittal reformatted images. Radiation dose reduction  techniques included automated exposure control or exposure modulation based on body size. Count of known CT and cardiac nuc med studies performed in previous  12 months: 0.     COMPARISON:   None.    FINDINGS:   No hemorrhage, acute infarction, mass lesion, or abnormal extra-axial fluid collection. No midline shift or focal mass effect. Ventricular system is normal in size and configuration. Moderate generalized atrophy and chronic small vessel disease  throughout the supratentorial white matter. No acute osseous abnormality. Visualized paranasal sinuses are clear. Visualized mastoid air cells are clear.      Impression:      No acute intracranial abnormality. Age-related senescent changes.          Signer Name: Venu Roque MD   Signed: 5/30/2021 11:02 PM   Workstation Name: [id number]    [facility]    CT Upper Extremity Right Without Contrast [[id number]] Collected: 05/30/21 1947     Updated: 05/30/21 1949    Narrative:      CT UE RT WO    INDICATION:    Right shoulder pain status post fall today.    TECHNIQUE:   CT of the right shoulder without IV contrast. Coronal and sagittal reconstructions were obtained.  Radiation dose reduction techniques included automated exposure control or exposure modulation based on body size. Count of known CT and cardiac nuc med  studies performed in previous 12 months: 0.     COMPARISON:    Right shoulder x-rays performed the same date.    FINDINGS:  There is no evidence of acute fracture or dislocation of the right shoulder. There is chronic flattening of the right humeral head, likely secondary to remote posttraumatic deformity. Mild degenerative change of the glenohumeral joint. Moderate  degenerative change of the acromioclavicular joint. No glenohumeral effusion. Periarticular soft tissues are unremarkable. Included lung fields are clear.      Impression:        1. No acute fracture or dislocation of the right shoulder.  2. Chronic flattening of the  right humeral head, likely secondary to remote posttraumatic deformity. Mild glenohumeral arthrosis.  3. Moderate AC joint arthrosis.    Signer Name: Venu Roque MD   Signed: 5/30/2021 7:47 PM   Workstation Name: [email or web address]    [facility]    XR Ankle 3+ View Right [[id number]] Collected: 05/30/21 1812     Updated: 05/30/21 1814    Narrative:      CR Ankle Min 3 Vws RT    INDICATION:   Right ankle pain status post fall today.    COMPARISON:   None.    FINDINGS:  3 view(s) of the right ankle.  No fracture or dislocation. Prominent plantar calcaneal spur. Ossification at the Achilles tendon insertion. Mild degenerative changes throughout the midfoot. No bone erosion or destruction. No foreign body.      Impression:      No acute fracture or dislocation.    Signer Name: Venu Roque MD   Signed: 5/30/2021 6:12 PM   Workstation Name: [facility]    Radiology [facility]    XR Knee 4+ View Right [[id number]] Collected: 05/30/21 1810     Updated: 05/30/21 1812    Narrative:      CR Knee Min 4 Vws RT    INDICATION:   Anterior right knee pain status post fall today.    COMPARISON:   None available.    FINDINGS:  4 view(s) of the right knee.  No acute fracture or dislocation. Small joint effusion. Moderate degenerative changes in all 3 compartments. No bone erosion or destruction.  No foreign body.      Impression:      Moderate tricompartmental arthrosis. Small joint effusion. No acute fracture or dislocation.    Signer Name: Venu Roque MD   Signed: 5/30/2021 6:10 PM   Workstation Name: [facility]    [facility]    XR Shoulder 2+ View Right [[id number]] Collected: 05/30/21 1809     Updated: 05/30/21 1811    Narrative:      CR Shoulder Comp Min 2 Vws RT    INDICATION:   Right shoulder pain status post fall today.    COMPARISON:   None available.    FINDINGS:   3 views of the right shoulder.  Slightly flattened appearance of the humeral head of  indeterminate age. This may also be slightly projectional. However, a humeral head fracture cannot be excluded. No glenohumeral dislocation. Moderate degenerative  changes of the acromioclavicular joint.  No foreign body.      Impression:      Slightly flattened appearance of the humeral head, indeterminate age. This may be chronic in nature and somewhat due to projectional artifact. However, a humeral head fracture cannot be excluded on the exam. No glenohumeral dislocation. CT of the right  shoulder may be considered if further characterization is warranted.    Signer Name: Venu Roque MD   Signed: 5/30/2021 6:09 PM   Workstation Name: [email or web address]    Radiology [facility]    CT Lumbar Spine Without Contrast [[id number]] Collected: 05/30/21 1725     Updated: 05/30/21 1728    Narrative:      CT Spine Lumbar WO    INDICATION:    Back pain status post fall today.    TECHNIQUE:   CT of the lumbar spine without IV contrast. Coronal and sagittal reconstructions were obtained.  Radiation dose reduction techniques included automated exposure control or exposure modulation based on body size. Count of known CT and cardiac nuc med  studies performed in previous 12 months: 0.     COMPARISON:    None available.    FINDINGS:  No acute fractures. Lumbar vertebral body heights and alignment are adequately maintained. Mild to moderate multilevel discogenic degenerative changes, most prominent at L2-3. Moderate multilevel facet degeneration. Mild central canal stenosis at  multiple levels. Paravertebral soft tissues are unremarkable. Included sacrum and SI joints are intact.      Impression:        1. No acute lumbar spine injury.  2. Multilevel degenerative changes, detailed above.    Signer Name: Venu Roque MD   Signed: 5/30/2021 5:25 PM   Workstation Name: [email or web address]    [facility]    CT Cervical Spine Without Contrast [[id number]] Collected: 05/30/21 1710     Updated: 05/30/21 1712     Narrative:      CT Spine Cervical WO    INDICATION:   Neck pain status post fall today.    TECHNIQUE:   CT of the cervical spine without IV contrast. Coronal and sagittal reconstructions were obtained.  Radiation dose reduction techniques included automated exposure control or exposure modulation based on body size. Count of known CT and cardiac nuc med  studies performed in previous 12 months: 0.     COMPARISON:  None available.    FINDINGS:  No acute fracture. Vertebral body heights and alignment are normally maintained. Atlantoaxial relationship and cervicothoracic junction are within normal limits. Mild to moderate multilevel degenerative disc changes and facet arthropathy. Moderate  central canal stenosis at C5-6. Paravertebral soft tissues are unremarkable. Included lung fields are clear.      Impression:        1. No acute cervical spine injury.  2. Multilevel degenerative changes, detailed above.    Signer Name: eVnu Roque MD   Signed: 5/30/2021 5:10 PM   Workstation Name: [email or web address]    [facility]        I have personally reviewed the above radiology images and read the final radiology report on 05/31/21  ---------------------------------------------------------------------------------------------------------------------  Assessment / Plan     Active Hospital Problems    Diagnosis  POA   • **UTI (urinary tract infection) [N39.0]  Yes       ASSESSMENT/PLAN:  Sepsis present on admission secondary to acute UTI (HR >90, WBC 19.10)  - UA shows 3+ glucose, trace ketones, positive nitrites, 1+ leukocytes, 2+ protein, 6-12 RBC, too numerous to count WBC, 4+ bacteria, and 3-6 squamous epithelium.   - CXR ordered.    - Pending urine culture and blood cultures x 2.   - Received 1 g IV Rocephin in ED; will continue IV Rocephin on admission while awaiting cultures.   - IV NS @ 75 mL/hr.   - Continuous cardiac monitoring ordered.   - Repeat AM CBC.     Frequent Falls associated with  dizziness and worsening debility, present on admission   - Daughter reports that patient has fell a total of 6 times over the past 2 months and sometimes due to dizziness and also unsteady gait.   - Imaging done in ED did not reveal any acute fracture or dislocation.  - CT head shows no acute abnormality.   - Obtain AM transthoracic echocardiogram.  - US bilateral carotids ordered.    - Orthostatic vitals ordered q shift; initial orthostatics negative.   - Continuous cardiac monitoring ordered.   - PT/OT consulted.     Presumed Acute on CKD, unknown baseline  - Creatinine 1.07; daughter was unsure of baseline and no previous labs available for review.   - CT abdomen/pelvis shows no obstructive uropathy.   - IV NS @ 75 mL/hr ordered.   - Repeat AM CMP.   - Monitor intake/output.     Mild Small Bowel Ileus  - CT abdomen/pelvis shows moderate stool burden and mild small bowel ileus.   - No complaints of abdominal pain or tenderness or nausea and vomiting.   - Bowel regimen ordered with daily colace and PRN miralax; if no BM with bowel regimen then suppository ordered.     Elevated Alkaline Phosphatase   - Alkaline phosphatase 133.   - Repeat AM CMP.     Elevated Anion-gap   - AG 17; UA showed ketones; negative acetone.   - Repeat AM CMP.     Insulin-dependent Type II Diabetes Mellitus with Hyperglycemia  - Glucose 309.   - Obtain hemoglobin A1c.   - Accuchecks qAC and qHS ordered.   - Moderate-dose SSI ordered; titrate as necessary.  - Hypoglycemic protocol in place as needed.     Paroxysmal Atrial Fibrillation   - [id number] score approximately 4 with age, gender and history of HTN and DM; takes daily aspirin, but no on anticoagulation as patient had developed postmenopausal bleeding while on anticoagulation so this was stopped.   - EKG ordered.   - Continuous cardiac monitoring ordered.     Essential Hypertension  - BP is slightly elevated.   - Continue to monitor per hospital protocol.   - Will review home  medications once reconciled by pharmacy.     COPD, without acute exacerbation  - ABG shows mild alkalosis; otherwise unremarkable.   - Supplemental oxygen ordered; titrate to maintain SpO2 90-95%.   - Continuous pulse oximetry ordered.     Osteoporosis  - Supportive care.   - PT/OT consulted.     GERD  - Review H2 blocker/PPI regimen once reconciled by pharmacy.   ----------  -DVT prophylaxis: SQ Heparin   -Activity: Up with assistance; fall precautions  -Expected length of stay:   INPATIENT status due to the need for care which can only be reasonably provided in an hospital setting such as aggressive/expedited ancillary services and/or consultation services, the necessity for IV medications, close physician monitoring and/or the possible need for procedures.  In such, I feel patient's risk for adverse outcomes and need for care warrant INPATIENT evaluation and predict the patient's care encounter to likely last beyond 2 midnights.    High risk secondary to sepsis secondary to acute UTI and frequent falls associated with dizziness and worsening debility     Disposition: Daughter and POA, Ducle Horta, is requesting that patient be placed in SNF upon discharge.  has been consulted.     CODE Status: DNR/DNI. Discussed with daughter and MÓNICAJESSICA.    Kat Villafuerte PA-C  05/31/21  01:14 EDT    ---------------------------------------------------------------------------------------------------------------------           Electronically signed by Frankie Wood MD at 05/31/21 0137       Vital Signs (last day)     Date/Time   Temp   Temp src   Pulse   Resp   BP   Patient Position   SpO2    06/01/21 0901   --   --   80   --   132/57   --   --    06/01/21 0711   --   --   --   --   --   --   90    06/01/21 0646   98 (36.7)   Oral   78   20   132/67   Lying   97    06/01/21 0502   --   --   100   --   --   --   --    06/01/21 0424   --   --   (!) 124   --   134/81   Lying   --    06/01/21 2278   --   --    118   --   144/84   Lying   --    06/01/21 0324   --   --   (!) 133   18   (!) 83/56   Standing   96    06/01/21 0319   --   --   120   18   (!) 151/112   Sitting   94    06/01/21 0314   98.2 (36.8)   Oral   119   18   136/88   Lying   90    06/01/21 0231   --   --   (!) 129   --   130/80   --   --    06/01/21 0213   --   --   (!) 125   --   112/63   Lying   --    06/01/21 0133   --   --   (!) 127   --   (!) 163/105   Lying   --    06/01/21 0058   --   --   (!) 135   --   (!) 192/86   --   --    06/01/21 0051   --   --   (!) 133   --   (!) 182/98   Lying   --    05/31/21 1908   98.6 (37)   Oral   95   18   147/65   Lying   93    05/31/21 1412   98.2 (36.8)   Oral   97   18   146/60   Lying   96    05/31/21 1410   98.2 (36.8)   Oral   97   18   152/62   Sitting   96    05/31/21 0615   98 (36.7)   Oral   90   18   150/75   Lying   95    05/31/21 0229   99.2 (37.3)   Oral   92   16   152/70   Lying   95    05/31/21 0117   --   --   --   --   --   --   96    05/31/21 0100   --   --   102   18   163/69   Lying   --    05/31/21 0050   --   --   102   --   (!) 168/139   Standing   --    05/31/21 0045   --   --   100   --   157/90   Sitting   --    05/31/21 0040   --   --   98   --   149/74   Lying   --    05/31/21 0023   99.2 (37.3)   Oral   93   18   (!) 181/82   Lying   95    05/31/21 00:00:43   --   --   95   18   163/84   Lying   93              Intake & Output (last day)       05/31 0701 - 06/01 0700 06/01 0701 - 06/02 0700    P.O. 1320     I.V. (mL/kg) 1748.8 (15.2)     IV Piggyback      Total Intake(mL/kg) 3068.8 (26.7)     Urine (mL/kg/hr) 1000 (0.4)     Stool      Total Output 1000     Net +2068.8           Urine Unmeasured Occurrence 2 x         Lines, Drains & Airways    Active LDAs     Name:   Placement date:   Placement time:   Site:   Days:    Peripheral IV 05/30/21 2309 Right Antecubital   05/30/21 2309    Antecubital   1    External Urinary Catheter   05/31/21    0547    --   1                  Current  Facility-Administered Medications   Medication Dose Route Frequency Provider Last Rate Last Admin   • acetaminophen (TYLENOL) tablet 650 mg  650 mg Oral Q6H PRN Kat Villafuerte PA-C   650 mg at 05/31/21 0237   • amLODIPine (NORVASC) tablet 5 mg  5 mg Oral Daily Rhoda Win DO   5 mg at 06/01/21 0901   • benzonatate (TESSALON) capsule 100 mg  100 mg Oral TID PRN Rhoda Win DO       • bisacodyl (DULCOLAX) suppository 10 mg  10 mg Rectal Once PRN Frankie Wood MD       • budesonide (PULMICORT) nebulizer solution 0.5 mg  0.5 mg Nebulization BID - RT Rhoda Win DO       • cefTRIAXone (ROCEPHIN) 1 g/100 mL 0.9% NS (MBP)  1 g Intravenous Q24H Frankie Wood MD   1 g at 05/31/21 2033   • dextrose (D50W) 25 g/ 50mL Intravenous Solution 25 g  25 g Intravenous Q15 Min PRN Frankie Wood MD       • dextrose (D50W) 25 g/ 50mL Intravenous Solution 25 g  25 g Intravenous Q15 Min PRN Frankie Wood MD       • dextrose (GLUTOSE) oral gel 15 g  15 g Oral Q15 Min PRN Frankie Wood MD       • dextrose (GLUTOSE) oral gel 15 g  15 g Oral Q15 Min PRN Frankie Wood MD       • docusate sodium (COLACE) capsule 100 mg  100 mg Oral BID Frankie Wood MD   100 mg at 06/01/21 0901   • donepezil (ARICEPT) tablet 5 mg  5 mg Oral Daily Rhoda Win DO   5 mg at 06/01/21 0901   • DULoxetine (CYMBALTA) DR capsule 30 mg  30 mg Oral Daily Rhoda Win DO   30 mg at 06/01/21 0901   • furosemide (LASIX) tablet 20 mg  20 mg Oral Daily Rhoda Win DO   20 mg at 06/01/21 0901   • glucagon (human recombinant) (GLUCAGEN DIAGNOSTIC) injection 1 mg  1 mg Subcutaneous Q15 Min PRN Frankie Wood MD       • glucagon (human recombinant) (GLUCAGEN DIAGNOSTIC) injection 1 mg  1 mg Subcutaneous Q15 Min PRN Frankie Wood MD       • guaiFENesin-dextromethorphan (ROBITUSSIN DM) 100-10 MG/5ML syrup 10 mL  10 mL Oral Q6H PRN  Kat Villafuerte PA-C   10 mL at 06/01/21 0525   • heparin (porcine) 5000 UNIT/ML injection 5,000 Units  5,000 Units Subcutaneous Q12H Frankie Wood MD   5,000 Units at 06/01/21 0901   • insulin aspart (novoLOG) injection 0-9 Units  0-9 Units Subcutaneous TID AC Frankie Wood MD   7 Units at 06/01/21 0902   • insulin detemir (LEVEMIR) injection 40 Units  40 Units Subcutaneous Daily Frankie Wood MD   40 Units at 06/01/21 0903   • lidocaine (LIDODERM) 5 % 1 patch  1 patch Transdermal Q24H Rhoda Win DO   1 patch at 06/01/21 0212   • montelukast (SINGULAIR) tablet 10 mg  10 mg Oral Daily Rhoda Win DO   10 mg at 06/01/21 0901   • pantoprazole (PROTONIX) EC tablet 40 mg  40 mg Oral QAM Rhoda Win DO   40 mg at 06/01/21 0502   • polyethylene glycol (MIRALAX) packet 17 g  17 g Oral BID PRN Frankie Wood MD       • potassium chloride (K-DUR,KLOR-CON) ER tablet 10 mEq  10 mEq Oral Daily Rhoda Win DO   10 mEq at 06/01/21 0901   • sodium chloride 0.9 % flush 10 mL  10 mL Intravenous PRN Frankie Wood MD       • sodium chloride 0.9 % flush 10 mL  10 mL Intravenous Q12H Frankie Wood MD   10 mL at 06/01/21 0902   • sodium chloride 0.9 % flush 10 mL  10 mL Intravenous PRN Frankie Wood MD       • sodium chloride 0.9 % infusion  75 mL/hr Intravenous Continuous Frankie Wood MD 75 mL/hr at 06/01/21 0102 75 mL/hr at 06/01/21 0102   • tiZANidine (ZANAFLEX) tablet 2 mg  2 mg Oral Q12H PRN Rhoda Win DO   2 mg at 06/01/21 0213       Lab Results (last 24 hours)     Procedure Component Value Units Date/Time    Urine Culture - Urine, Urine, Clean Catch [[id number]]  (Abnormal)  (Susceptibility) Collected: 05/30/21 2135    Specimen: Urine, Clean Catch Updated: 06/01/21 0940     Urine Culture >100,000 CFU/mL Escherichia coli    Susceptibility      Escherichia coli      CARLOS      Ampicillin Susceptible       Ampicillin + Sulbactam Susceptible      Cefazolin Susceptible      Cefepime Susceptible      Ceftazidime Susceptible      Ceftriaxone Susceptible      Gentamicin Susceptible      Levofloxacin Susceptible      Nitrofurantoin Susceptible      Piperacillin + Tazobactam Susceptible      Tetracycline Susceptible      Trimethoprim + Sulfamethoxazole Susceptible               Linear View                   POC Glucose Once [[id number]]  (Abnormal) Collected: 06/01/21 0646    Specimen: Blood Updated: 06/01/21 0652     Glucose 309 mg/dL     Blood Culture - Blood, Arm, Right [[id number]] Collected: 05/30/21 2228    Specimen: Blood from Arm, Right Updated: 05/31/21 2245     Blood Culture No growth at 24 hours    Blood Culture - Blood, Arm, Left [[id number]] Collected: 05/30/21 2228    Specimen: Blood from Arm, Left Updated: 05/31/21 2245     Blood Culture No growth at 24 hours    POC Glucose Once [[id number]]  (Abnormal) Collected: 05/31/21 1956    Specimen: Blood Updated: 05/31/21 2003     Glucose 326 mg/dL     POC Glucose Once [[id number]]  (Abnormal) Collected: 05/31/21 1606    Specimen: Blood Updated: 05/31/21 1612     Glucose 283 mg/dL         Orders (last 24 hrs)      Start     Ordered    06/01/21 0930  budesonide (PULMICORT) nebulizer solution 0.5 mg  2 Times Daily - RT      06/01/21 0143    06/01/21 0900  amLODIPine (NORVASC) tablet 5 mg  Daily      06/01/21 0143    06/01/21 0900  donepezil (ARICEPT) tablet 5 mg  Daily      06/01/21 0143    06/01/21 0900  DULoxetine (CYMBALTA) DR capsule 30 mg  Daily      06/01/21 0143    06/01/21 0900  furosemide (LASIX) tablet 20 mg  Daily      06/01/21 0143    06/01/21 0900  montelukast (SINGULAIR) tablet 10 mg  Daily      06/01/21 0143    06/01/21 0900  potassium chloride (K-DUR,KLOR-CON) ER tablet 10 mEq  Daily      06/01/21 0143    06/01/21 0700  pantoprazole (PROTONIX) EC tablet 40 mg  Every Morning      06/01/21 0143    06/01/21 0653  POC Glucose Once  Once      06/01/21 0646     06/01/21 0507  guaiFENesin-dextromethorphan (ROBITUSSIN DM) 100-10 MG/5ML syrup 10 mL  Every 6 Hours PRN      06/01/21 0507    06/01/21 0429  ECG 12 Lead  STAT      06/01/21 0428    06/01/21 0315  dilTIAZem (CARDIZEM) injection 5 mg  Once      06/01/21 0220    06/01/21 0230  metoprolol tartrate (LOPRESSOR) injection 5 mg  Once      06/01/21 0130    06/01/21 0230  lidocaine (LIDODERM) 5 % 1 patch  Every 24 Hours Scheduled      06/01/21 0143    06/01/21 0145  metoprolol tartrate (LOPRESSOR) injection 5 mg  Once      06/01/21 0053    06/01/21 0143  tiZANidine (ZANAFLEX) tablet 2 mg  Every 12 Hours PRN      06/01/21 0143    06/01/21 0142  benzonatate (TESSALON) capsule 100 mg  3 Times Daily PRN      06/01/21 0143    06/01/21 0048  ECG 12 Lead  STAT      06/01/21 0047    06/01/21 0041  benzonatate (TESSALON) capsule 100 mg  3 Times Daily PRN,   Status:  Discontinued      06/01/21 0041    05/31/21 2200  cefTRIAXone (ROCEPHIN) 1 g/100 mL 0.9% NS (MBP)  Every 24 Hours      05/31/21 0025    05/31/21 2200  guaiFENesin-dextromethorphan (ROBITUSSIN DM) 100-10 MG/5ML syrup 5 mL  Once      05/31/21 2037    05/31/21 2004  POC Glucose Once  Once      05/31/21 1956    05/31/21 1613  POC Glucose Once  Once      05/31/21 1606    05/31/21 1208  SLP Consult: Eval & Treat Other; coughs with po intake  Once      05/31/21 1207    05/31/21 0900  insulin detemir (LEVEMIR) injection 40 Units  Daily      05/31/21 0140    05/31/21 0730  insulin aspart (novoLOG) injection 0-9 Units  3 Times Daily Before Meals      05/30/21 2344    05/31/21 0700  POC Glucose 4x Daily AC & at Bedtime  4 Times Daily Before Meals & at Bedtime     Comments: If bedtime blood glucose is greater than 350 mg/dl, call MD.      05/30/21 2344    05/31/21 0700  POC Glucose 4x Daily AC & at Bedtime  4 Times Daily Before Meals & at Bedtime     Comments: If bedtime blood glucose is greater than 350 mg/dl, call MD.      05/31/21 0139    05/31/21 0400  Strict Intake & Output   Every 4 Hours      05/31/21 0025    05/31/21 0233  acetaminophen (TYLENOL) tablet 650 mg  Every 6 Hours PRN      05/31/21 0233    05/31/21 0200  heparin (porcine) 5000 UNIT/ML injection 5,000 Units  Every 12 Hours Scheduled      05/31/21 0025    05/31/21 0138  glucagon (human recombinant) (GLUCAGEN DIAGNOSTIC) injection 1 mg  Every 15 Minutes PRN      05/31/21 0139    05/31/21 0138  dextrose (GLUTOSE) oral gel 15 g  Every 15 Minutes PRN      05/31/21 0139    05/31/21 0138  dextrose (D50W) 25 g/ 50mL Intravenous Solution 25 g  Every 15 Minutes PRN      05/31/21 0139    05/31/21 0115  sodium chloride 0.9 % flush 10 mL  Every 12 Hours Scheduled      05/31/21 0025    05/31/21 0115  sodium chloride 0.9 % infusion  Continuous      05/31/21 0025    05/31/21 0100  docusate sodium (COLACE) capsule 100 mg  2 Times Daily      05/30/21 2350    05/31/21 0047  Orthostatic Blood Pressure  Every Shift      05/31/21 0046    05/31/21 0026  Daily Weights  Daily      05/31/21 0025    05/31/21 0025  sodium chloride 0.9 % flush 10 mL  As Needed      05/31/21 0025    05/30/21 2350  bisacodyl (DULCOLAX) suppository 10 mg  Once As Needed      05/30/21 2350    05/30/21 2349  polyethylene glycol (MIRALAX) packet 17 g  2 Times Daily PRN      05/30/21 2350    05/30/21 2343  dextrose (GLUTOSE) oral gel 15 g  Every 15 Minutes PRN      05/30/21 2344    05/30/21 2343  dextrose (D50W) 25 g/ 50mL Intravenous Solution 25 g  Every 15 Minutes PRN      05/30/21 2344    05/30/21 2343  glucagon (human recombinant) (GLUCAGEN DIAGNOSTIC) injection 1 mg  Every 15 Minutes PRN      05/30/21 2344    05/30/21 2217  sodium chloride 0.9 % flush 10 mL  As Needed      05/30/21 2217    05/30/21 0000  HYDROcodone-acetaminophen (NORCO) 5-325 MG per tablet  Every 6 Hours PRN      05/30/21 1958    Unscheduled  Up With Assistance  As Needed      05/31/21 0025    --  insulin glargine (LANTUS, SEMGLEE) 100 UNIT/ML injection  Daily      05/30/21 9649    --  tiZANidine  (ZANAFLEX) 2 MG half tablet  2 times daily      05/30/21 2357    --  montelukast (SINGULAIR) 10 MG tablet  Daily      05/30/21 2357    --  ezetimibe 10 MG tablet 10 mg, simvastatin 40 MG tablet 40 mg  Nightly      05/30/21 2357    --  benzonatate (TESSALON) 100 MG capsule  3 Times Daily PRN      05/30/21 2357    --  budesonide-formoterol (SYMBICORT) 80-4.5 MCG/ACT inhaler  2 Times Daily - RT,   Status:  Discontinued      05/30/21 2357    --  potassium chloride 10 MEQ CR tablet  Daily      05/30/21 2357    --  DULoxetine (CYMBALTA) 30 MG capsule  Daily      05/30/21 2357    --  furosemide (LASIX) 10 MG half tablet  Daily,   Status:  Discontinued      05/30/21 2357    --  triamterene-hydrochlorothiazide (MAXZIDE-25) 37.5-25 MG per tablet  Daily      05/30/21 2357    --  traZODone (DESYREL) 100 MG tablet  Nightly,   Status:  Discontinued      05/30/21 2357    --  esomeprazole (nexIUM) 40 MG capsule  Every Morning Before Breakfast      05/30/21 2357    --  losartan (COZAAR) 50 MG tablet  Daily,   Status:  Discontinued      05/30/21 2357    --  albuterol sulfate  (90 Base) MCG/ACT inhaler  Every 6 Hours PRN      05/30/21 2357    --  donepezil (ARICEPT) 5 MG tablet  Daily      05/30/21 2357    --  furosemide (LASIX) 20 MG tablet  Daily      05/31/21 1026    --  budesonide (PULMICORT) 180 MCG/ACT inhaler  2 Times Daily - RT      05/31/21 1026    --  amLODIPine (NORVASC) 5 MG tablet  Daily      05/31/21 1026                   Respiratory Therapy (last 24 hours)      Respiratory Therapy Flowsheet     Row Name 06/01/21 0901 06/01/21 0711 06/01/21 0646 06/01/21 0502 06/01/21 0424       $ Oximetry Charges    $ O2 Pt/System Assessment  --  yes  --  --  --       Oxygen Therapy    SpO2  --  90 %  97 %  --  --    Device (Oxygen Therapy)  room air  room air  --  --  --       Vital Signs    Temp  --  --  98 °F (36.7 °C)  --  --    Temp src  --  --  Oral  --  --    Pulse  80  --  78  100  (!) 124    Heart Rate Source  --  --   Monitor  Monitor  Monitor    Resp  --  --  20  --  --    Resp Rate Source  --  --  Visual  --  --    BP  132/57  --  132/67  --  134/81    Noninvasive MAP (mmHg)  --  --  83  --  --    BP Location  --  --  Right arm  --  Right arm    BP Method  --  --  Automatic  --  Automatic    Patient Position  --  --  Lying  --  Lying       Assessment    Respiratory WDL  WDL except;all  --  --  --  --    Rhythm/Pattern, Respiratory  no shortness of breath reported;depth regular;pattern regular;unlabored  --  --  --  --    Expansion/Accessory Muscles/Retractions  no retractions;no use of accessory muscles  --  --  --  --    Nailbeds  no discoloration  --  --  --  --    Mucous Membranes  pink;intact;moist  --  --  --  --    Cough Frequency  infrequent  --  --  --  --    Cough Type  dry;good  --  --  --  --    Skin Integrity  bruised (ecchymotic);excoriation  --  --  --  --       Breath Sounds    Breath Sounds  All Fields  --  --  --  --    All Lung Fields Breath Sounds  Anterior:;Lateral:;wheezes, expiratory  --  --  --  --       Treatment/Therapy    Cough And Deep Breathing  done with encouragement  --  --  --  --       Aerosol Therapy (SVN)    Respiratory Treatment Status (SVN)  --  refused  --  --  --       Care Plan Interventions    Skin Protection  adhesive use limited;incontinence pads utilized;skin sealant/moisture barrier applied;skin-to-device areas padded;tubing/devices free from skin contact  --  --  --  --    Row Name 06/01/21 0338 06/01/21 0324 06/01/21 0319 06/01/21 0314 06/01/21 0231       Oxygen Therapy    SpO2  --  96 %  94 %  90 %  --    Pulse Oximetry Type  --  Continuous  Continuous  Continuous  --    Device (Oxygen Therapy)  --  room air  room air  room air  --       Vital Signs    Temp  --  --  --  98.2 °F (36.8 °C)  --    Temp src  --  --  --  Oral  --    Pulse  118  (!) 133  120  119  (!) 129    Heart Rate Source  Monitor  Monitor  Monitor  Monitor  --    Resp  --  18  18  18  --    Resp Rate Source  --   Visual  Visual  Visual  --    BP  144/84  (!) 83/56  (!) 151/112  136/88  130/80    Noninvasive MAP (mmHg)  --  66  128  106  --    BP Location  Right arm  Right arm  Left arm  Left arm  --    BP Method  Automatic  Automatic  Automatic  Automatic  --    Patient Position  Lying  Standing  Sitting  Lying  --    Row Name 06/01/21 0213 06/01/21 0133 06/01/21 0058 06/01/21 0051 05/31/21 2033       Oxygen Therapy    Device (Oxygen Therapy)  --  --  --  --  room air       Vital Signs    Pulse  (!) 125  (!) 127  (!) 135  (!) 133  --    Heart Rate Source  Monitor  Monitor  --  Monitor  --    BP  112/63  (!) 163/105  (!) 192/86  (!) 182/98  --    BP Location  Left arm  Left arm  --  Left arm  --    BP Method  Automatic  Automatic  --  Automatic  --    Patient Position  Lying  Lying  --  Lying  --       Assessment    Respiratory WDL  --  --  --  --  WDL except;breath sounds;cough    Cough Frequency  --  --  --  --  infrequent    Cough Type  --  --  --  --  dry;good    Skin Integrity  --  --  --  --  bruised (ecchymotic);excoriation excoritation under breast        Breath Sounds    Breath Sounds  --  --  --  --  All Fields    All Lung Fields Breath Sounds  --  --  --  --  Anterior:;wheezes, expiratory       Care Plan Interventions    Skin Protection  --  --  --  --  adhesive use limited;incontinence pads utilized;pulse oximeter probe site changed;transparent dressing maintained;tubing/devices free from skin contact    Row Name 05/31/21 1908 05/31/21 1412 05/31/21 1410             Oxygen Therapy    SpO2  93 %  96 %  96 %      Pulse Oximetry Type  Continuous  --  --      Device (Oxygen Therapy)  room air  --  --         Vital Signs    Temp  98.6 °F (37 °C)  98.2 °F (36.8 °C)  98.2 °F (36.8 °C)      Temp src  Oral  Oral  Oral      Pulse  95  97  97      Heart Rate Source  Monitor  Monitor  Monitor      Resp  18  18  18      Resp Rate Source  Visual  Visual  Visual      BP  147/65  146/60  152/62      Noninvasive MAP (mmHg)  104   --  --      BP Location  Right arm  Right arm  Right arm      BP Method  Automatic  Automatic  Automatic      Patient Position  Lying  Lying  Sitting          Operative/Procedure Notes (last 24 hours) (Notes from 21 1010 through 21 1010)    No notes of this type exist for this encounter.            Physician Progress Notes (last 24 hours) (Notes from 21 1010 through 21 1010)      Kat Villafuerte PA-C at 21 0133        Was notified by RN that patient was in atrial fibrillation with RVR with rates 120-130's. STAT EKG obtained confirming atrial fibrillation with RVR with . IV metoprolol 5 mg administered.     Update: Patient was still running atrial fibrillation 120's, so another 5 mg of IV metroprolol administered. Will continue to monitor.      Update: Patient continuing to run 120-130's, so IV cardizem 5 mg was administered.     Update: Patient converted to sinus rhythm HR 90's. EKG below to confirm.               Electronically signed by Kat Villafuerte PA-C at 21 0704     Rocky Méndez DO at 21 1326              [facility]     Patient Identification:  Name:  Andreea Marshall  Age:  71 y.o.  Sex:  female  :  1950  MRN:  [id number]  Visit Number:  [id number]  Primary Care Provider:  Provider, No Known    Length of stay:  1    Chief complaint: None    Subjective:    Patient states she is feeling well today and has no specific complaints.  She does deny fever, chills, sweats, rigors, nausea, vomiting, dysuria or any other symptoms at this time.  Upon further questioning, patient denies any dizziness or lightheadedness at home.  She does report frequent falls but is not sure why this occurs.  She states that she will be standing up on her own 2 feet and then the next minute she will be on the  floor.  ----------------------------------------------------------------------------------------------------------------------  Current Hospital Meds:  cefTRIAXone, 1 g, Intravenous, Q24H  docusate sodium, 100 mg, Oral, BID  heparin (porcine), 5,000 Units, Subcutaneous, Q12H  insulin aspart, 0-9 Units, Subcutaneous, TID AC  insulin detemir, 40 Units, Subcutaneous, Daily  sodium chloride, 10 mL, Intravenous, Q12H      sodium chloride, 75 mL/hr, Last Rate: 75 mL/hr (05/31/21 1318)      ----------------------------------------------------------------------------------------------------------------------  Vital Signs:  Temp:  [98 °F (36.7 °C)-99.2 °F (37.3 °C)] 98 °F (36.7 °C)  Heart Rate:  [] 90  Resp:  [14-18] 18  BP: (144-181)/() 150/75      05/30/21  1624 05/31/21  0023 05/31/21  0505   Weight: 104 kg (230 lb) 117 kg (257 lb) 117 kg (257 lb) (admit weight pt not on floor 24 hrs)     Body mass index is 45.53 kg/m².    Intake/Output Summary (Last 24 hours) at 5/31/2021 1326  Last data filed at 5/31/2021 0800  Gross per 24 hour   Intake 1042.4 ml   Output 200 ml   Net 842.4 ml     Diet Regular; Consistent Carbohydrate, Renal, Cardiac  ----------------------------------------------------------------------------------------------------------------------  Physical exam:  Constitutional: Morbidly obese  female in no apparent distress.     HENT:  Head:  Normocephalic and atraumatic.  Mouth:  Moist mucous membranes.    Eyes:  Conjunctivae and EOM are normal.  Pupils are equal, round, and reactive to light.  No scleral icterus.    Neck:  Neck supple. No thyromegaly.  No JVD present.    Cardiovascular:  Regular rate and rhythm with no murmurs, rubs, clicks or gallops appreciated.  Pulmonary/Chest:  Clear to auscultation bilaterally with no crackles, wheezes or rhonchi appreciated.  Abdominal:  Soft. Nontender. Nondistended  Bowel sounds are normal in all four quadrants. No organomegally appreciated.    Musculoskeletal:   1+ pitting edema bilateral lower extremities, right lower extremity wrapped in Ace bandage.  Neurological:  Alert and oriented to person, place, and time. Cranial nerves II-XII intact with no focal deficits.  No facial droop.  No slurred speech.   Skin:  Warm and dry to palpation with no rashes or lesions appreciated.  Peripheral vascular:  2+ radial and pedal pulses in bilateral upper and lower extremities.  Psychiatric:  Alert and oriented x3, demonstrates appropriate judgement and insight.  ----------------------------------------------------------------------------------------------------------------------  Tele:    ----------------------------------------------------------------------------------------------------------------------  Results from last 7 days   Lab Units 05/31/21  0820 05/31/21  0212   TROPONIN T ng/mL <0.010 <0.010     Results from last 7 days   Lab Units 05/31/21  0212 05/30/21 2230 05/30/21  2137   CRP mg/dL 1.45*  --  1.30*   LACTATE mmol/L  --  1.9  --    WBC 10*3/mm3 13.51*  --  19.10*   HEMOGLOBIN g/dL 15.2  --  17.0*   HEMATOCRIT % 46.5  --  52.5*   MCV fL 91.9  --  91.5   MCHC g/dL 32.7  --  32.4   PLATELETS 10*3/mm3 284  --  358     Results from last 7 days   Lab Units 05/30/21  2303   PH, ARTERIAL pH units 7.487*   PO2 ART mm Hg 69.2*   PCO2, ARTERIAL mm Hg 40.6   HCO3 ART mmol/L 30.6*     Results from last 7 days   Lab Units 05/31/21  0212 05/30/21  2137   SODIUM mmol/L 135* 136   POTASSIUM mmol/L 3.4* 3.8   CHLORIDE mmol/L 95* 93*   CO2 mmol/L 26.3 26.0   BUN mg/dL 13 13   CREATININE mg/dL 0.96 1.07*   EGFR IF NONAFRICN AM mL/min/1.73 57* 50*   CALCIUM mg/dL 9.2 10.1   GLUCOSE mg/dL 324* 309*   ALBUMIN g/dL 3.78 4.17   BILIRUBIN mg/dL 0.9 1.0   ALK PHOS U/L 118* 133*   AST (SGOT) U/L 14 17   ALT (SGPT) U/L 13 17   Estimated Creatinine Clearance: 64.4 mL/min (by C-G formula based on SCr of 0.96 mg/dL).    No results found for: AMMONIA      No results found for:  BLOODCX  Urine Culture   Date Value Ref Range Status   05/30/2021 >100,000 CFU/mL Gram Negative Bacilli (A)  Preliminary     No results found for: WOUNDCX  No results found for: STOOLCX    I have personally looked at the labs and they are summarized above.  ----------------------------------------------------------------------------------------------------------------------  Imaging Results (Last 24 Hours)     Procedure Component Value Units Date/Time    US Carotid Bilateral [[id number]] Collected: 05/31/21 1016     Updated: 05/31/21 1021    Narrative:      EXAMINATION: US CAROTID BILATERAL-      Technique: Multiple real-time color Doppler images were acquired of  bilateral carotid arteries.     Stenosis measurements if obtained, were performed by the NASCET or  similar method.        CLINICAL INDICATION:     pre-syncope, frequent falls;  S43.401A-Unspecified sprain of right shoulder joint, initial encounter;  S80.01XA-Contusion of right knee, initial encounter; S16.1XXA-Strain of  muscle, fascia and tendon at neck level, initial encounter;  N39.0-Urinary tract infection, site not specified     COMPARISON:    None     FINDINGS:        RIGHT:  Mild intimal thickening is noted and mild plaque. No occlusion.  RIGHT CCA PSV:138 cm/s  RIGHT ICA PSV: 72 cm/s  RIGHT ICA EDV: 9 cm/s  Right ICA/CCA Ratio: 0.5  Anterograde flow is demonstrated in RIGHT vertebral artery.     LEFT:  Mild interval thickening and mild plaque. No occlusion. Tortuosity left  ICA is noted.  LEFT CCA PSV: 126 cm/s  LEFT ICA PSV: 43 cm/s  LEFT EDV: 9 cm/s  Left ICA/CCA Ratio: 0.3  Anterograde flow is demonstrated in LEFT vertebral artery.          Impression:      1. Mild plaque left greater than right carotid systems. No occlusion.  2. No hemodynamically significant stenosis of either RIGHT or LEFT ICA.  3. Antegrade flow noted both vertebral arteries.     This report was finalized on 5/31/2021 10:19 AM by Dr. Jericho Piper MD.       XR Chest 1 View  [[id number]] Collected: 05/31/21 0136     Updated: 05/31/21 0138    Narrative:      CR Chest 1 Vw    INDICATION:   Cough. Sepsis.     COMPARISON:    None available.    FINDINGS:  Single portable AP view(s) of the chest.    The heart and mediastinal contours are normal. The lungs are clear. No pneumothorax or pleural effusion.       Impression:      No acute cardiopulmonary findings.    Signer Name: Deven Rios MD   Signed: 5/31/2021 1:36 AM   Workstation Name: [email or web address]    [facility]    CT Abdomen Pelvis Stone Protocol [[id number]] Collected: 05/30/21 2343     Updated: 05/30/21 2345    Narrative:      CT Abdomen Pelvis WO    INDICATION:   Flank pain. Urinary tract infection. Fall today.    TECHNIQUE:   CT of the abdomen and pelvis without IV contrast. Coronal and sagittal reconstructions were obtained.  Radiation dose reduction techniques included automated exposure control or exposure modulation based on body size. Count of known CT and cardiac nuc  med studies performed in previous 12 months: 4.     COMPARISON:   None available.    FINDINGS:  There is some motion degradation. There is mild scarring/atelectasis in the lung bases. There is atherosclerotic disease with no aortic aneurysm. Gallbladder is surgically absent. No biliary obstruction is seen. 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 No renal or ureteral stones are identified, and there is no hydronephrosis. Unenhanced solid abdominal organs are otherwise unremarkable. Urinary bladder is normal. Solid pelvic organs are normal.    Moderate stool burden in the colon may indicate constipation. There is no evidence of acute colitis. The appendix is not clearly identified, but there is no evidence of acute appendicitis. There is some gas in small bowel loops suggesting a mild ileus.  There is a diverticulum in the third portion of the duodenum. No free fluid or adenopathy is identified. No  acute fractures are seen.      Impression:        1. No renal or ureteral stones. No hydronephrosis.  2. Moderate stool burden in the colon may indicate constipation.  3. Mild small bowel ileus suspected. No bowel obstruction.  4. Cholecystectomy without biliary obstruction.      Signer Name: Deven Rios MD   Signed: 5/30/2021 11:43 PM   Workstation Name: [email or web address]    [facility]    CT Head Without Contrast [[id number]] Collected: 05/30/21 2302     Updated: 05/30/21 2304    Narrative:      CT Head WO    HISTORY:   Generalized weakness today. Fall today.    TECHNIQUE:   Routine noncontrast head CT. Coronal and sagittal reformatted images. Radiation dose reduction techniques included automated exposure control or exposure modulation based on body size. Count of known CT and cardiac nuc med studies performed in previous  12 months: 0.     COMPARISON:   None.    FINDINGS:   No hemorrhage, acute infarction, mass lesion, or abnormal extra-axial fluid collection. No midline shift or focal mass effect. Ventricular system is normal in size and configuration. Moderate generalized atrophy and chronic small vessel disease  throughout the supratentorial white matter. No acute osseous abnormality. Visualized paranasal sinuses are clear. Visualized mastoid air cells are clear.      Impression:      No acute intracranial abnormality. Age-related senescent changes.          Signer Name: Venu Roque MD   Signed: 5/30/2021 11:02 PM   Workstation Name: [id number]    [facility]    CT Upper Extremity Right Without Contrast [[id number]] Collected: 05/30/21 1947     Updated: 05/30/21 1949    Narrative:      CT UE RT WO    INDICATION:    Right shoulder pain status post fall today.    TECHNIQUE:   CT of the right shoulder without IV contrast. Coronal and sagittal reconstructions were obtained.  Radiation dose reduction techniques included automated exposure control or exposure  modulation based on body size. Count of known CT and cardiac nuc med  studies performed in previous 12 months: 0.     COMPARISON:    Right shoulder x-rays performed the same date.    FINDINGS:  There is no evidence of acute fracture or dislocation of the right shoulder. There is chronic flattening of the right humeral head, likely secondary to remote posttraumatic deformity. Mild degenerative change of the glenohumeral joint. Moderate  degenerative change of the acromioclavicular joint. No glenohumeral effusion. Periarticular soft tissues are unremarkable. Included lung fields are clear.      Impression:        1. No acute fracture or dislocation of the right shoulder.  2. Chronic flattening of the right humeral head, likely secondary to remote posttraumatic deformity. Mild glenohumeral arthrosis.  3. Moderate AC joint arthrosis.    Signer Name: Venu Roque MD   Signed: 5/30/2021 7:47 PM   Workstation Name: [email or web address]    [facility]    XR Ankle 3+ View Right [[id number]] Collected: 05/30/21 1812     Updated: 05/30/21 1814    Narrative:      CR Ankle Min 3 Vws RT    INDICATION:   Right ankle pain status post fall today.    COMPARISON:   None.    FINDINGS:  3 view(s) of the right ankle.  No fracture or dislocation. Prominent plantar calcaneal spur. Ossification at the Achilles tendon insertion. Mild degenerative changes throughout the midfoot. No bone erosion or destruction. No foreign body.      Impression:      No acute fracture or dislocation.    Signer Name: Venu Roque MD   Signed: 5/30/2021 6:12 PM   Workstation Name: [email or web address]    [facility]    XR Knee 4+ View Right [[id number]] Collected: 05/30/21 1810     Updated: 05/30/21 1812    Narrative:      CR Knee Min 4 Vws RT    INDICATION:   Anterior right knee pain status post fall today.    COMPARISON:   None available.    FINDINGS:  4 view(s) of the right knee.  No acute fracture or dislocation. Small joint  effusion. Moderate degenerative changes in all 3 compartments. No bone erosion or destruction.  No foreign body.      Impression:      Moderate tricompartmental arthrosis. Small joint effusion. No acute fracture or dislocation.    Signer Name: Venu Roque MD   Signed: 5/30/2021 6:10 PM   Workstation Name: [facility]    [facility]    XR Shoulder 2+ View Right [[id number]] Collected: 05/30/21 1809     Updated: 05/30/21 1811    Narrative:      CR Shoulder Comp Min 2 Vws RT    INDICATION:   Right shoulder pain status post fall today.    COMPARISON:   None available.    FINDINGS:   3 views of the right shoulder.  Slightly flattened appearance of the humeral head of indeterminate age. This may also be slightly projectional. However, a humeral head fracture cannot be excluded. No glenohumeral dislocation. Moderate degenerative  changes of the acromioclavicular joint.  No foreign body.      Impression:      Slightly flattened appearance of the humeral head, indeterminate age. This may be chronic in nature and somewhat due to projectional artifact. However, a humeral head fracture cannot be excluded on the exam. No glenohumeral dislocation. CT of the right  shoulder may be considered if further characterization is warranted.    Signer Name: Venu Roque MD   Signed: 5/30/2021 6:09 PM   Workstation Name: [facility]    [facility]    CT Lumbar Spine Without Contrast [[id number]] Collected: 05/30/21 1725     Updated: 05/30/21 1728    Narrative:      CT Spine Lumbar WO    INDICATION:    Back pain status post fall today.    TECHNIQUE:   CT of the lumbar spine without IV contrast. Coronal and sagittal reconstructions were obtained.  Radiation dose reduction techniques included automated exposure control or exposure modulation based on body size. Count of known CT and cardiac nuc med  studies performed in previous 12 months: 0.     COMPARISON:    None  available.    FINDINGS:  No acute fractures. Lumbar vertebral body heights and alignment are adequately maintained. Mild to moderate multilevel discogenic degenerative changes, most prominent at L2-3. Moderate multilevel facet degeneration. Mild central canal stenosis at  multiple levels. Paravertebral soft tissues are unremarkable. Included sacrum and SI joints are intact.      Impression:        1. No acute lumbar spine injury.  2. Multilevel degenerative changes, detailed above.    Signer Name: Venu Roque MD   Signed: 5/30/2021 5:25 PM   Workstation Name: [email or web address]    [facility]        ----------------------------------------------------------------------------------------------------------------------  Assessment and Plan:    1.  Sepsis -present on admission, continue to treat underlying etiology which is felt to be secondary to acute cystitis    2.  Acute cystitis -continue Rocephin 1 g IV daily, urine culture currently pending, but preliminary does demonstrate greater than 100,000 CFU of gram-negative bacilli    3.  Frequent falls -etiology unclear, bilateral carotid artery ultrasound demonstrates no evidence of occlusion or hemodynamically significant stenosis.  Transthoracic echocardiogram demonstrates chronic diastolic CHF with preserved left ventricular systolic function.      4.  Insulin-dependent type 2 diabetes mellitus -continue Accu-Cheks before every meal and nightly with sliding scale insulin    5.  General debility -continue PT/OT, will likely require placement on discharge      Disposition Case management consulted for assistance with placement.  Will likely be stable for discharge in the next 24 to 48 hours.        Rocky Méndez DO  05/31/21  13:26 EDT    Electronically signed by Rocky Méndez DO at 05/31/21 1334       Consult Notes (last 24 hours) (Notes from 05/31/21 1010 through 06/01/21 1010)    No notes of this type exist for this  encounter.            Physical Therapy Notes (last 24 hours) (Notes from 21 1010 through 21 1010)      Heidy Fajardo, PT at 21 1340  Version 1 of 1         Acute Care - Physical Therapy Initial Evaluation  [facility]     Patient Name: Andreea Marshall  : 1950  MRN: [id number]  Today's Date: 2021   Onset of Illness/Injury or Date of Surgery: 21       PT Assessment (last 12 hours)      PT Evaluation and Treatment     Row Name 21 1326          Physical Therapy Time and Intention    Subjective Information  complains of;weakness;pain  -AG     Document Type  evaluation  -AG     Mode of Treatment  physical therapy  -AG     Patient Effort  adequate  -AG     Symptoms Noted During/After Treatment  fatigue;increased pain  -AG     Row Name 21 1326          General Information    Patient Profile Reviewed  yes  -AG     Onset of Illness/Injury or Date of Surgery  21  -AG     Referring Physician  Dr. Wood  -AG     Patient Observations  alert;cooperative;agree to therapy  -AG     Patient/Family/Caregiver Comments/Observations  pt. supine; morbidly obese.  R knee is ace wrapped post fall, pt. reports experiencing significant pain.   -AG     Prior Level of Function  min assist:;all household mobility sometimes needed assistance from dtr. w/ ambulation  -AG     Equipment Currently Used at Home  cane, straight;walker, rolling  -AG     Pertinent History of Current Functional Problem  pt. admitted w/ UTI; hx frequent falls.  hx dementia, COPD, GERD.  Uses walker or cane for ambulation, however, at times uses no assistive device.   -AG     Existing Precautions/Restrictions  fall  -AG     Limitations/Impairments  safety/cognitive  -AG     Equipment Issued to Patient  gait belt  -AG     Risks Reviewed  patient:;LOB;dizziness;increased discomfort;change in vital signs  -AG     Benefits Reviewed  patient:;improve function;increase independence;increase strength;increase balance;decrease risk  of DVT;increase knowledge  -AG     Barriers to Rehab  previous functional deficit;cognitive status  -AG     Row Name 05/31/21 1326          Previous Level of Function/Home Environm    Household Ambulation, Premorbid Functional Level  needs assistive device for safe performance;needs assistance for safe performance  -AG     Community Ambulation, Premorbid Functional Level  needs assistive device for safe performance;needs assistance for safe performance  -AG     Row Name 05/31/21 1326          Living Environment    Current Living Arrangements  home/apartment/condo  -AG     Home Accessibility  wheelchair accessible  -AG     Lives With  child(binh), adult lives with her daughter and grandchildren  -AG     Row Name 05/31/21 1326          Home Use of Assistive/Adaptive Equipment    Equipment Currently Used at Home  cane, straight;walker, rolling  -AG     Row Name 05/31/21 1326          Sensory    Hearing Status  WFL  -     Row Name 05/31/21 1326          Vision Assessment/Intervention    Visual Impairment/Limitations  corrective lenses full-time  -     Row Name 05/31/21 1326          Cognition    Affect/Mental Status (Cognitive)  confused  -AG     Orientation Status (Cognition)  oriented to;person  -AG     Follows Commands (Cognition)  follows one-step commands;increased processing time needed;initiation impaired;physical/tactile prompts required;repetition of directions required;verbal cues/prompting required  -AG     Personal Safety Interventions  fall prevention program maintained;gait belt;nonskid shoes/slippers when out of bed;supervised activity  -AG     Row Name 05/31/21 1326          Pain    Additional Documentation  Pain Scale: FACES Pre/Post-Treatment (Group)  -AG     Row Name 05/31/21 1326          Pain Scale: FACES Pre/Post-Treatment    Pain: FACES Scale, Pretreatment  4-->hurts little more  -AG     Posttreatment Pain Rating  4-->hurts little more  -AG     Pain Location - Side  Right  -AG     Pain Location  - Orientation  generalized  -     Pain Location  knee  -[facility] Name 05/31/21 1326          Range of Motion (ROM)    Range of Motion  -- R knee decr AROM d/t pain  -[facility] Name 05/31/21 1326          Range of Motion Comprehensive    General Range of Motion  -- R shoulder ~50% of full AROM  -AG     Row Name 05/31/21 1326          Strength (Manual Muscle Testing)    Strength (Manual Muscle Testing)  -- L LE 4-/5; R quads 2+/5 (limited by pain)  -[facility] Name 05/31/21 1326          Mobility    Extremity Weight-bearing Status  left lower extremity;right lower extremity  -     Right Lower Extremity (Weight-bearing Status)  weight-bearing as tolerated (WBAT)  -[facility] Name 05/31/21 1326          Bed Mobility    Bed Mobility  rolling left;rolling right;scooting/bridging;supine-sit;sit-supine  -     Scooting/Bridging [place] (Bed Mobility)  verbal cues;nonverbal cues (demo/gesture);moderate assist (50% patient effort)  -     Supine-Sit [place] (Bed Mobility)  verbal cues;nonverbal cues (demo/gesture);moderate assist (50% patient effort)  -     Sit-Supine [place] (Bed Mobility)  verbal cues;nonverbal cues (demo/gesture);maximum assist (25% patient effort)  -     Bed Mobility, Safety Issues  decreased use of legs for bridging/pushing  -     Assistive Device (Bed Mobility)  bed rails;draw sheet  -AG     Row Name 05/31/21 1326          Transfers    Transfers  bed-chair transfer;chair-bed transfer;sit-stand transfer;stand-sit transfer;stand pivot/stand step transfer  -     Sit-Stand [place] (Transfers)  verbal cues;nonverbal cues (demo/gesture);moderate assist (50% patient effort)  -     Stand-Sit [place] (Transfers)  verbal cues;nonverbal cues (demo/gesture);moderate assist (50% patient effort)  -AG     Row Name 05/31/21 1326          Sit-Stand Transfer    Assistive Device (Sit-Stand Transfers)  walker, front-wheeled  -AG     Row Name 05/31/21 1326          Stand-Sit  Transfer    Assistive Device (Stand-Sit Transfers)  walker, front-wheeled  -AG     Row Name 05/31/21 1326          Gait/Stairs (Locomotion)    [place] Level (Gait)  verbal cues;nonverbal cues (demo/gesture);moderate assist (50% patient effort)  -AG     Assistive Device (Gait)  walker, front-wheeled  -AG     Deviations/Abnormal Patterns (Gait)  antalgic  -AG     Comment (Gait/Stairs)  pt. unable to perform forward ambulation d/t limited weightshifting, R knee pain.  Able to ambulate 5-6 small steps to L toward HOB.   -AG     Row Name 05/31/21 1326          Safety Issues, Functional Mobility    Impairments Affecting Function (Mobility)  balance;endurance/activity tolerance;pain;range of motion (ROM);strength  -AG     Row Name 05/31/21 1326          Balance    Balance Assessment  sitting static balance;sitting dynamic balance;sit to stand dynamic balance;standing static balance;standing dynamic balance  -AG     Static Sitting Balance  WFL;unsupported;sitting, edge of bed  -AG     Static Standing Balance  moderate impairment;supported;standing  -AG     Dynamic Standing Balance  moderate impairment;supported;standing  -AG     Row Name 05/31/21 1326          Coping    Observed Emotional State  calm;cooperative  -AG     Verbalized Emotional State  acceptance  -AG     Row Name 05/31/21 1326          Plan of Care Review    Plan of Care Reviewed With  patient  -AG     Row Name 05/31/21 1326          Physical Therapy Goals    Bed Mobility Goal Selection (PT)  bed mobility, PT goal 1  -AG     Transfer Goal Selection (PT)  transfer, PT goal 1  -AG     Gait Training Goal Selection (PT)  gait training, PT goal 1  -AG     Row Name 05/31/21 1326          Bed Mobility Goal 1 (PT)    Activity/Assistive Device (Bed Mobility Goal 1, PT)  rolling to left;rolling to right;scooting;sit to supine/supine to sit  -AG     [place] Level/Cues Needed (Bed Mobility Goal 1, PT)  contact guard assist  -AG     Time Frame (Bed Mobility Goal  1, PT)  by discharge  -AG     Row Name 05/31/21 1326          Transfer Goal 1 (PT)    Activity/Assistive Device (Transfer Goal 1, PT)  sit-to-stand/stand-to-sit;bed-to-chair/chair-to-bed;toilet;walker, rolling  -AG     [place] Level/Cues Needed (Transfer Goal 1, PT)  contact guard assist  -AG     Time Frame (Transfer Goal 1, PT)  by discharge  -AG     Row Name 05/31/21 1326          Gait Training Goal 1 (PT)    Activity/Assistive Device (Gait Training Goal 1, PT)  gait (walking locomotion);assistive device use;decrease fall risk;diminish gait deviation;improve balance and speed;walker, rolling  -AG     [place] Level (Gait Training Goal 1, PT)  contact guard assist  -AG     Distance (Gait Training Goal 1, PT)  10  -AG     Time Frame (Gait Training Goal 1, PT)  by discharge  -AG     Row Name 05/31/21 1326          Positioning and Restraints    Pre-Treatment Position  in bed  -AG     Post Treatment Position  bed  -AG     In Bed  supine;call light within reach;encouraged to call for assist;side rails up x3;heels elevated  -AG     Row Name 05/31/21 1326          Therapy Assessment/Plan (PT)    Patient/Family Therapy Goals Statement (PT)  return home  -AG     Functional Level at Time of Evaluation (PT)  max/ mod A  -AG     PT Diagnosis (PT)  decr functional mobility  -AG     Rehab Potential (PT)  good, to achieve stated therapy goals  -AG     Criteria for Skilled Interventions Met (PT)  yes;meets criteria  -AG     Predicted Duration of Therapy Intervention (PT)  until d/c  -AG     Problem List (PT)  problems related to;balance;cognition;mobility;range of motion (ROM);strength;pain  -AG     Activity Limitations Related to Problem List (PT)  unable to ambulate safely;unable to transfer safely  -AG     Row Name 05/31/21 1326          Therapy Plan Review/Discharge Plan (PT)    Therapy Plan Review (PT)  evaluation/treatment results reviewed;care plan/treatment goals reviewed;risks/benefits reviewed;current/potential  barriers reviewed;participants voiced agreement with care plan;participants included;patient  -AG       User Key  (r) = Recorded By, (t) = Taken By, (c) = Cosigned By    Initials Name Provider Type    Heidy Purcell PT Physical Therapist        Physical Therapy Education                 Title: PT OT SLP Therapies (Done)     Topic: Physical Therapy (Done)     Point: Mobility training (Done)     Learning Progress Summary           Patient Acceptance, E,D, VU,NR by  at 5/31/2021 1325                   Point: Home exercise program (Done)     Learning Progress Summary           Patient Acceptance, E,D, VU,NR by  at 5/31/2021 1325                   Point: Body mechanics (Done)     Learning Progress Summary           Patient Acceptance, E,D, VU,NR by  at 5/31/2021 1325                   Point: Precautions (Done)     Learning Progress Summary           Patient Acceptance, E,D, VU,NR by  at 5/31/2021 1325                               User Key     Initials Effective Dates Name Provider Type [facility] 04/03/18 -  Heidy Fajardo PT Physical Therapist PT              PT Recommendation and Plan  Anticipated Discharge Disposition (PT): inpatient rehabilitation facility, skilled nursing facility  Planned Therapy Interventions (PT): balance training, bed mobility training, gait training, home exercise program, patient/family education, postural re-education, ROM (range of motion), strengthening, transfer training  Therapy Frequency (PT): other (see comments) (3-5 times/ week per priority)  Plan of Care Reviewed With: patient       Time Calculation:   PT Charges     Row Name 05/31/21 1325             Time Calculation    PT Received On  05/31/21  -      PT Goal Re-Cert Due Date  06/07/21  -        User Key  (r) = Recorded By, (t) = Taken By, (c) = Cosigned By    Initials Name Provider Type    Heidy Purcell PT Physical Therapist        Therapy Charges for Today     Code Description Service Date Service  Provider Modifiers Qty    [id number]  PT EVAL HIGH COMPLEXITY 4 2021 Heidy Fajardo, PT GP 1               Heidy Fajardo, PT  2021      Electronically signed by Heidy Fajardo, PT at 21 1340          Occupational Therapy Notes (last 24 hours) (Notes from 21 1010 through 21 1010)      Jericho Mitchell, OT at 21 1145          Acute Care - Occupational Therapy Initial Evaluation   Sky     Patient Name: Andreea Marshall  : 1950  MRN: [id number]  Today's Date: 2021             Admit Date: 2021       ICD-10-CM ICD-9-CM   1. Sprain of right shoulder, unspecified shoulder sprain type, initial encounter  S43.401A 840.9   2. Contusion of right knee, initial encounter  S80.01XA 924.11   3. Strain of neck muscle, initial encounter  S16.1XXA 847.0   4. Acute UTI  N39.0 599.0     Patient Active Problem List   Diagnosis   • UTI (urinary tract infection)   • Dementia (CMS/HCC)   • Paroxysmal atrial fibrillation (CMS/HCC)   • COPD (chronic obstructive pulmonary disease) (CMS/HCC)   • Osteoporosis with fracture   • Diverticulosis   • GERD without esophagitis   • Type 2 diabetes mellitus, with long-term current use of insulin (CMS/HCC)     Past Medical History:   Diagnosis Date   • COPD (chronic obstructive pulmonary disease) (CMS/HCC)    • Dementia (CMS/HCC)    • Diverticulosis    • Essential hypertension    • GERD (gastroesophageal reflux disease)    • Osteoporosis    • Paroxysmal atrial fibrillation (CMS/HCC)    • Type II diabetes mellitus (CMS/HCC)      Past Surgical History:   Procedure Laterality Date   • APPENDECTOMY     • CHOLECYSTECTOMY     • TONSILLECTOMY              OT ASSESSMENT FLOWSHEET (last 12 hours)      OT Evaluation and Treatment     Row Name 21 1136                   OT Time and Intention    Document Type  evaluation  -KR        Mode of Treatment  occupational therapy  -KR        Patient Effort  adequate  -KR           General Information    General  Observations of Patient  alert/cooperative  -KR           Cognition    Affect/Mental Status (Cognitive)  confused  -KR        Orientation Status (Cognition)  oriented to;person  -KR        Follows Commands (Cognition)  increased processing time needed  -KR           Range of Motion Comprehensive    Comment, General Range of Motion  BUE shoulders, 3/5, remaining 4/5  -KR           Strength Comprehensive (MMT)    Comment, General Manual Muscle Testing (MMT) Assessment  BUE shoulders 3-/5, remaining 4/5  -KR           Activities of Daily Living    BADL Assessment/Intervention  bathing;upper body dressing;lower body dressing;grooming;feeding;toileting  -KR           Bathing Assessment/Intervention    [place] Level (Bathing)  bathing skills;moderate assist (50% patient effort)  -KR           Upper Body Dressing Assessment/Training    [place] Level (Upper Body Dressing)  upper body dressing skills;moderate assist (50% patient effort)  -KR           Lower Body Dressing Assessment/Training    [place] Level (Lower Body Dressing)  lower body dressing skills;maximum assist (25% patient effort)  -KR           Grooming Assessment/Training    [place] Level (Grooming)  grooming skills;minimum assist (75% patient effort)  -KR           Self-Feeding Assessment/Training    [place] Level (Feeding)  feeding skills;set up  -KR           Toileting Assessment/Training    [place] Level (Toileting)  toileting skills;maximum assist (25% patient effort)  -KR           Plan of Care Review    Plan of Care Reviewed With  patient  -KR           OT Goals    Strength Goal Selection (OT)  strength, OT goal 1  -KR           Strength Goal 1 (OT)    Strength Goal 1 (OT)  BUE increase x 1 to enhance mobility/self care skills  -KR        Time Frame (Strength Goal 1, OT)  by discharge  -KR           Patient Education Goal (OT)    Activity (Patient Education Goal, OT)  AE/DME training to enhance safety/independence in home  environment  -KR        [place]/Cues/Accuracy (Memory Goal 2, OT)  verbalizes understanding  -KR        Time Frame (Patient Education Goal, OT)  by discharge  -KR           Therapy Assessment/Plan (OT)    Criteria for Skilled Therapeutic Interventions Met (OT)  yes;skilled treatment is necessary  -KR        Planned Therapy Interventions (OT)  activity tolerance training;adaptive equipment training;BADL retraining;ROM/therapeutic exercise;strengthening exercise  -KR           Therapy Plan Review/Discharge Plan (OT)    Anticipated Discharge Disposition (OT)  extended care facility;inpatient rehabilitation facility  -KR          User Key  (r) = Recorded By, (t) = Taken By, (c) = Cosigned By    Initials Name Effective Dates    Jericho Machado OT 18 -                OT Recommendation and Plan  Planned Therapy Interventions (OT): activity tolerance training, adaptive equipment training, BADL retraining, ROM/therapeutic exercise, strengthening exercise  Plan of Care Review  Plan of Care Reviewed With: patient  Plan of Care Reviewed With: patient        Time Calculation:     Therapy Charges for Today     Code Description Service Date Service Provider Modifiers Qty    [id number]  OT EVAL MOD COMPLEXITY 4 2021 Jericho Mitchell OT GO 1               Jericho Mitchell OT  2021    Electronically signed by Jericho Mitchell OT at 21 1145          Speech Language Pathology Notes (last 24 hours) (Notes from 21 1010 through 21 1010)      Diana Real, MS CCC-SLP at 21 1439          Acute Care - Speech Language Pathology   Swallow Initial Evaluation  Sky    CLINICAL DYSPHAGIA ASSESSMENT     Patient Name: Andreea Marshall  : 1950  MRN: [id number]  Today's Date: 2021  Onset of Illness/Injury or Date of Surgery: 21     Referring Physician: Dr. Wood      Admit Date: 2021    Andreea Marshall  is seen at bedside this this pm on 3S to assess safety/efficacy of  swallowing fnx, determine safest/least restrictive diet tolerance. Her family member is present at bedside for this assessment.     Ms. Marshall was admitted to [facility] 5/30/21 2/2 more frequent falls. Her daughter, primary careprovider, reports that Ms. Marshall has fallen a total of 6 times overt the last 8 weeks. She suffered a fall 5/30/21 while attempting to ambulate to the bathroom w/ her walker. She began to feel dizzy and fell. She did sustain injuries to her right leg and an abrasion just under her nose. She presented w/ a UTI.     Ms. Marshall is referred to r/o dysphagia 2/2 reported chronic cough w/ cough observed by nursing staff w/ concerns of dysphagia. She denies any h/o PNA. She does indicate h/o asthma and bronchitis, along w/ chronic cough and significant NIMA.     Social History     Socioeconomic History   • Marital status:      Spouse name: Not on file   • Number of children: Not on file   • Years of education: Not on file   • Highest education level: Not on file   Tobacco Use   • Smoking status: Never Smoker   • Smokeless tobacco: Never Used   Substance and Sexual Activity   • Alcohol use: Never   • Drug use: Never   • Sexual activity: Defer      Imaging:   CR Chest 1 Vw     INDICATION:   Cough. Sepsis.      COMPARISON:    None available.     FINDINGS:  Single portable AP view(s) of the chest.     The heart and mediastinal contours are normal. The lungs are clear. No pneumothorax or pleural effusion.      IMPRESSION:  No acute cardiopulmonary findings.     Signer Name: Deven Rios MD   Signed: 5/31/2021 1:36 AM   Workstation Name: [id number]    [facility]    Labs:  05/31/21 1007  POC Glucose Once   Collected: 05/31/21 1001  Final result  Specimen: Blood    Glucose 343High  mg/dL            05/31/21 0903  Troponin   Collected: 05/31/21 0820  Final result  Specimen: Blood    Troponin T <0.010 ng/mL            05/31/21 0640  POC Glucose Once   Collected: 05/31/21 0625   Final result  Specimen: Blood    Glucose 230High  mg/dL            05/31/21 0252  Troponin   Collected: 05/31/21 0212  Final result  Specimen: Blood    Troponin T <0.010 ng/mL            05/31/21 0252  Comprehensive Metabolic Panel   Collected: 05/31/21 0212  Final result  Specimen: Blood    Glucose 324High  mg/dL ALT (SGPT) 13 U/L   BUN 13 mg/dL AST (SGOT) 14 U/L   Creatinine 0.96 mg/dL Alkaline Phosphatase 118High  U/L   Sodium 135Low  mmol/L Total Bilirubin 0.9 mg/dL   Potassium 3.4Low  mmol/L eGFR Non African Am 57Low  mL/min/1.73   Chloride 95Low  mmol/L Globulin 3.4 gm/dL   CO2 26.3 mmol/L A/G Ratio 1.1 g/dL   Calcium 9.2 mg/dL BUN/Creatinine Ratio 13.5   Total Protein 7.2 g/dL Anion Gap 13.7 mmol/L   Albumin 3.78 g/dL            05/31/21 0252  C-reactive Protein   Collected: 05/31/21 0212  Final result  Specimen: Blood    C-Reactive Protein 1.45High  mg/dL            05/31/21 0244  Hemoglobin A1c   Collected: 05/31/21 0212  Final result  Specimen: Blood    Hemoglobin A1C 11.40High  %            05/31/21 0230  CBC Auto Differential   Collected: 05/31/21 0212  Final result  Specimen: Blood    WBC 13.51High  10*3/mm3 Lymphocyte Rel % 22.6 %   RBC 5.06 10*6/mm3 Monocyte Rel % 5.4 %   Hemoglobin 15.2 g/dL Eosinophil Rel % 1.2 %   Hematocrit 46.5 % Basophil Rel % 0.4 %   MCV 91.9 fL Immature Granulocyte Rel % 0.2 %   MCH 30.0 pg Neutrophils Absolute 9.47High  10*3/mm3   MCHC 32.7 g/dL Lymphocytes Absolute 3.06 10*3/mm3   RDW 12.6 % Monocytes Absolute 0.73 10*3/mm3   RDW-SD 42.7 fl Eosinophils Absolute 0.16 10*3/mm3   MPV 8.4 fL Basophils Absolute 0.06 10*3/mm3   Platelets 284 10*3/mm3 Immature Grans, Absolute 0.03 10*3/mm3   Neutrophil Rel % 70.2 % nRBC 0.0 /100 WBC          05/31/21 0047  POC Glucose Once   Collected: 05/31/21 0033  Final result  Specimen: Blood    Glucose 300High  mg/dL            05/31/21 0002  Acetone   Collected: 05/30/21 2311  Final result  Specimen: Blood    Acetone Negative            Diet Orders (active) (From admission, onward)     Start     Ordered    05/31/21 0141  Diet Regular; Consistent Carbohydrate, Renal, Cardiac  Diet Effective Now      05/31/21 0140                She is observed on ra w/o complications.     Ms. Marshall is positioned upright and centered in bed to accept multiple po presentations of ice chips, solid cracker, puree, and thin liquids via spoon, cup and straw.  She is able to self feed.     Facial/oral structures are symmetrical upon observation. Lingual protrusion reveals no deviation. Oral mucosa are moist, pink, and clean. Secretions are clear, thin, and controlled. OROM/EMILY is wfl to imitate oral postures. Gag is not assessed. Volitional cough is intact w/ adequate  intensity, clear in quality, non-productive. Voice is adequate in intensity, clear in quality w/ intelligible speech. She is a/a and interactive, pleasant and oriented. She follows simple directives and particpates in conversational exchanges. She is evidenced w/ a baseline dry cough, intermittent before, during, and after po presentations. Cough is clear, dry, non productive consistently. She is also evidenced w/ an habitual throat clear across this assessment.     Upon po presentations, adequate bolus anticipation and acceptance w/ good labial seal for bolus clearance via spoon bowl, cup rim stability and suction via straw. Bolus formation, manipulation and control are wfl w/ rotary mastication pattern. A-p transit is timely w/o oral residue. No overt s/s aspiration before the swallow.     Pharyngeal swallow is timely w/ adequate hyolaryngeal elevation per palpation. No overt s/s aspiration evidenced across this evaluation. No silent aspiration.    She is evidenced w/ a baseline dry cough, intermittent before, during, and after po presentations. Cough is clear, dry, non productive consistently. She is also evidenced w/ an habitual throat clear across this assessment. She reports this a premorbid  baseline status w/ chronic cough. She and her daughter deny concerns of aspiration, however, both report s/s concerning for chronic NIMA, including vocal hoarseness in am, retrosternal discomfort during and post meals, restrosternal dicomfort at [facility] to awaken Ms. Marshall, otitis media, post nasal drip. She reports current Rx tx Nexium daily 40mg.     Visit Dx:     ICD-10-CM ICD-9-CM   1. Sprain of right shoulder, unspecified shoulder sprain type, initial encounter  S43.401A 840.9   2. Contusion of right knee, initial encounter  S80.01XA 924.11   3. Strain of neck muscle, initial encounter  S16.1XXA 847.0   4. Acute UTI  N39.0 599.0     Patient Active Problem List   Diagnosis   • UTI (urinary tract infection)   • Dementia (CMS/[facility])   • Paroxysmal atrial fibrillation (CMS/[facility])   • COPD (chronic obstructive pulmonary disease) (CMS/[facility])   • Osteoporosis with fracture   • Diverticulosis   • GERD without esophagitis   • Type 2 diabetes mellitus, with long-term current use of insulin (CMS/[facility])     Past Medical History:   Diagnosis Date   • COPD (chronic obstructive pulmonary disease) (CMS/[facility])    • Dementia (CMS/[facility])    • Diverticulosis    • Essential hypertension    • GERD (gastroesophageal reflux disease)    • Osteoporosis    • Paroxysmal atrial fibrillation (CMS/[facility])    • Type II diabetes mellitus (CMS/[facility])      Past Surgical History:   Procedure Laterality Date   • APPENDECTOMY     • CHOLECYSTECTOMY     • TONSILLECTOMY       EDUCATION  The patient has been educated in the following areas:   Dysphagia (Swallowing Impairment) Oral Care/Hydration.    Impression: Ms. Marshall presents w/ wfl oropharyngeal swallow w/o s/s aspiration.No s/s indicative of silent aspiration.  No odynophagia reported.    She is observed with and does report, at premorbid baseline, report s/s concerning for chronic NIMA, including vocal hoarseness in am, retrosternal discomfort during and post meals, restrosternal dicomfort at [facility] to awaken Ms. Marshall,  otitis media, post nasal drip. She reports current Rx tx Nexium daily 40mg. NIMA could be a contributing factor to chronic cough and habitual globus sensation.     She is felt to most benefit from continued least restrictive regular consistency po diet, thin liquids. SLP did d/w pt and her daughter NIMA dietary guidelines and positioning safety for meals. Both verbalized understanding and agreement.     SLP Recommendation and Plan    1. Continue least restrictive regular consistency po diet, thin liquids.    2. Medications whole in puree/thins. Single pill presentations only.   3. Upright and centered for all po intake.  4. Strict NIMA precautions.  5. Oral care protocol.  No further formal SLP f/u warranted at this time.    D/w Ms. Marshall and her daughter results and recommendations w/ verbal agreement.    D/w RNLeon, results and recommendations w/ verbal agreement.    Thank you for allowing me to participate in the care of your patient-  Diana Real M.S., CCC/SLP                                                                        Time Calculation:       Therapy Charges for Today     Code Description Service Date Service Provider Modifiers Qty    [id number] HC ST EVAL ORAL PHARYNG SWALLOW 4 5/31/2021 Diana Real, MS CCC-SLP GN 1               Diana Real MS CCC-SLP  5/31/2021    Electronically signed by Diana Real, MS CCC-SLP at 05/31/21 1502       ADL Documentation (last day)     Date/Time Transferring Toileting Bathing Dressing Eating Communication Swallowing Change in Functional Status Since Onset of Current Illness/Injury Equipment Currently Used at Home    06/01/21 0901  3 - assistive equipment and person  3 - assistive equipment and person  3 - assistive equipment and person  3 - assistive equipment and person  0 - independent  0 - understands/communicates without difficulty  0 - swallows foods/liquids without difficulty  --  --    05/31/21 2033  3 - assistive  equipment and person  3 - assistive equipment and person  3 - assistive equipment and person  3 - assistive equipment and person  0 - independent  0 - understands/communicates without difficulty  0 - swallows foods/liquids without difficulty  --  --    05/31/21 1326  --  --  --  --  --  --  --  --  cane, straight;walker, rolling    05/31/21 1215  --  --  --  --  --  --  --  --  walker, rolling;cane, straight    05/31/21 0030  --  --  --  --  --  --  --  yes  cane, straight;walker, rolling;cpap;respiratory supplies;nebulizer    05/31/21 0028  --  --  --  --  --  --  --  yes  cane, straight;walker, rolling;cpap;respiratory supplies;nebulizer    05/31/21 0023  3 - assistive equipment and person  3 - assistive equipment and person  3 - assistive equipment and person  3 - assistive equipment and person  0 - independent  0 - understands/communicates without difficulty  0 - swallows foods/liquids without difficulty  --  --

## 2021-06-02 LAB
ANION GAP SERPL CALCULATED.3IONS-SCNC: 7.9 MMOL/L (ref 5–15)
BUN SERPL-MCNC: 15 MG/DL (ref 8–23)
BUN/CREAT SERPL: 20.5 (ref 7–25)
CALCIUM SPEC-SCNC: 8.8 MG/DL (ref 8.6–10.5)
CHLORIDE SERPL-SCNC: 106 MMOL/L (ref 98–107)
CO2 SERPL-SCNC: 27.1 MMOL/L (ref 22–29)
CREAT SERPL-MCNC: 0.73 MG/DL (ref 0.57–1)
DEPRECATED RDW RBC AUTO: 44.9 FL (ref 37–54)
ERYTHROCYTE [DISTWIDTH] IN BLOOD BY AUTOMATED COUNT: 12.9 % (ref 12.3–15.4)
GFR SERPL CREATININE-BSD FRML MDRD: 78 ML/MIN/1.73
GLUCOSE BLDC GLUCOMTR-MCNC: 211 MG/DL (ref 70–130)
GLUCOSE BLDC GLUCOMTR-MCNC: 300 MG/DL (ref 70–130)
GLUCOSE BLDC GLUCOMTR-MCNC: 303 MG/DL (ref 70–130)
GLUCOSE BLDC GLUCOMTR-MCNC: 322 MG/DL (ref 70–130)
GLUCOSE SERPL-MCNC: 232 MG/DL (ref 65–99)
HCT VFR BLD AUTO: 41.3 % (ref 34–46.6)
HGB BLD-MCNC: 13.1 G/DL (ref 12–15.9)
MCH RBC QN AUTO: 30 PG (ref 26.6–33)
MCHC RBC AUTO-ENTMCNC: 31.7 G/DL (ref 31.5–35.7)
MCV RBC AUTO: 94.5 FL (ref 79–97)
PLATELET # BLD AUTO: 255 10*3/MM3 (ref 140–450)
PMV BLD AUTO: 8.6 FL (ref 6–12)
POTASSIUM SERPL-SCNC: 3.9 MMOL/L (ref 3.5–5.2)
QT INTERVAL: 364 MS
QT INTERVAL: 392 MS
QT INTERVAL: 434 MS
QT INTERVAL: 440 MS
QTC INTERVAL: 435 MS
QTC INTERVAL: 460 MS
QTC INTERVAL: 464 MS
QTC INTERVAL: 471 MS
RBC # BLD AUTO: 4.37 10*6/MM3 (ref 3.77–5.28)
SODIUM SERPL-SCNC: 141 MMOL/L (ref 136–145)
WBC # BLD AUTO: 11.92 10*3/MM3 (ref 3.4–10.8)

## 2021-06-02 PROCEDURE — 82962 GLUCOSE BLOOD TEST: CPT

## 2021-06-02 PROCEDURE — 63710000001 INSULIN ASPART PER 5 UNITS: Performed by: HOSPITALIST

## 2021-06-02 PROCEDURE — 25010000002 CEFTRIAXONE PER 250 MG: Performed by: HOSPITALIST

## 2021-06-02 PROCEDURE — 93010 ELECTROCARDIOGRAM REPORT: CPT | Performed by: INTERNAL MEDICINE

## 2021-06-02 PROCEDURE — 99232 SBSQ HOSP IP/OBS MODERATE 35: CPT | Performed by: INTERNAL MEDICINE

## 2021-06-02 PROCEDURE — 63710000001 INSULIN DETEMIR PER 5 UNITS: Performed by: HOSPITALIST

## 2021-06-02 PROCEDURE — 93005 ELECTROCARDIOGRAM TRACING: CPT | Performed by: SPECIALIST

## 2021-06-02 PROCEDURE — 85027 COMPLETE CBC AUTOMATED: CPT | Performed by: INTERNAL MEDICINE

## 2021-06-02 PROCEDURE — 99232 SBSQ HOSP IP/OBS MODERATE 35: CPT | Performed by: SPECIALIST

## 2021-06-02 PROCEDURE — 93005 ELECTROCARDIOGRAM TRACING: CPT | Performed by: INTERNAL MEDICINE

## 2021-06-02 PROCEDURE — 25010000002 HEPARIN (PORCINE) PER 1000 UNITS: Performed by: HOSPITALIST

## 2021-06-02 PROCEDURE — 80048 BASIC METABOLIC PNL TOTAL CA: CPT | Performed by: INTERNAL MEDICINE

## 2021-06-02 PROCEDURE — 94799 UNLISTED PULMONARY SVC/PX: CPT

## 2021-06-02 RX ADMIN — GUAIFENESIN AND DEXTROMETHORPHAN 10 ML: 100; 10 SYRUP ORAL at 08:55

## 2021-06-02 RX ADMIN — INSULIN ASPART 7 UNITS: 100 INJECTION, SOLUTION INTRAVENOUS; SUBCUTANEOUS at 17:00

## 2021-06-02 RX ADMIN — SOTALOL HYDROCHLORIDE 80 MG: 80 TABLET ORAL at 08:49

## 2021-06-02 RX ADMIN — DULOXETINE HYDROCHLORIDE 30 MG: 30 CAPSULE, DELAYED RELEASE ORAL at 08:48

## 2021-06-02 RX ADMIN — INSULIN DETEMIR 40 UNITS: 100 INJECTION, SOLUTION SUBCUTANEOUS at 08:51

## 2021-06-02 RX ADMIN — DONEPEZIL HYDROCHLORIDE 5 MG: 5 TABLET, FILM COATED ORAL at 08:48

## 2021-06-02 RX ADMIN — SODIUM CHLORIDE 75 ML/HR: 9 INJECTION, SOLUTION INTRAVENOUS at 20:26

## 2021-06-02 RX ADMIN — POTASSIUM CHLORIDE 10 MEQ: 750 TABLET, FILM COATED, EXTENDED RELEASE ORAL at 08:48

## 2021-06-02 RX ADMIN — FUROSEMIDE 20 MG: 20 TABLET ORAL at 08:48

## 2021-06-02 RX ADMIN — SOTALOL HYDROCHLORIDE 80 MG: 80 TABLET ORAL at 20:25

## 2021-06-02 RX ADMIN — INSULIN ASPART 4 UNITS: 100 INJECTION, SOLUTION INTRAVENOUS; SUBCUTANEOUS at 08:48

## 2021-06-02 RX ADMIN — DOCUSATE SODIUM 100 MG: 100 CAPSULE, LIQUID FILLED ORAL at 20:26

## 2021-06-02 RX ADMIN — LIDOCAINE 1 PATCH: 50 PATCH CUTANEOUS at 08:49

## 2021-06-02 RX ADMIN — MONTELUKAST SODIUM 10 MG: 10 TABLET, COATED ORAL at 08:48

## 2021-06-02 RX ADMIN — CARBIDOPA AND LEVODOPA 5 MG: 50; 200 TABLET, EXTENDED RELEASE ORAL at 16:59

## 2021-06-02 RX ADMIN — AMLODIPINE BESYLATE 5 MG: 5 TABLET ORAL at 08:49

## 2021-06-02 RX ADMIN — CEFTRIAXONE 1 G: 1 INJECTION, POWDER, FOR SOLUTION INTRAMUSCULAR; INTRAVENOUS at 20:25

## 2021-06-02 RX ADMIN — PANTOPRAZOLE SODIUM 40 MG: 40 TABLET, DELAYED RELEASE ORAL at 05:23

## 2021-06-02 RX ADMIN — HEPARIN SODIUM 5000 UNITS: 5000 INJECTION INTRAVENOUS; SUBCUTANEOUS at 08:49

## 2021-06-02 RX ADMIN — HEPARIN SODIUM 5000 UNITS: 5000 INJECTION INTRAVENOUS; SUBCUTANEOUS at 20:26

## 2021-06-02 RX ADMIN — DOCUSATE SODIUM 100 MG: 100 CAPSULE, LIQUID FILLED ORAL at 08:48

## 2021-06-02 RX ADMIN — CARBIDOPA AND LEVODOPA 5 MG: 50; 200 TABLET, EXTENDED RELEASE ORAL at 08:48

## 2021-06-02 RX ADMIN — INSULIN ASPART 7 UNITS: 100 INJECTION, SOLUTION INTRAVENOUS; SUBCUTANEOUS at 11:12

## 2021-06-02 RX ADMIN — GUAIFENESIN AND DEXTROMETHORPHAN 10 ML: 100; 10 SYRUP ORAL at 22:32

## 2021-06-02 RX ADMIN — SODIUM CHLORIDE, PRESERVATIVE FREE 10 ML: 5 INJECTION INTRAVENOUS at 08:50

## 2021-06-02 NOTE — CASE MANAGEMENT/SOCIAL WORK
Discharge Planning Assessment   [place]     Patient Name: Andreea Marshall  MRN: [id number]  Today's Date: 6/2/2021    Admit Date: 5/30/2021      Discharge Plan     Row Name 06/02/21 1215       Plan    Plan  [facility] per Mary agreeable to accept pt when medically stable.  Pt's daughter Dulce aware and agreeable.  SS will follow up in am.        Continued Care and Services - Admitted Since 5/30/2021     Destination     Service Provider Request Status Selected Services Address Phone Fax Patient Preferred    [facility]  Pending - Request Sent N/A [address] [place] [address] [phone] --    [facility]  Declined  Accepted but chose another facility N[facility]A [address] [facility] [address] [phone] --              Livia Wilson, BSW

## 2021-06-02 NOTE — PROGRESS NOTES
[facility] HOSPITALIST PROGRESS NOTE     Patient Identification:  Name:  Andreea Marshall  Age:  71 y.o.  Sex:  female  :  1950  MRN:  [id number]  Visit Number:  [id number]  Primary Care Provider:  Provider, No Known    Length of stay:  3    Chief complaint: None    Subjective:    Patient doing well today with no new complaints overnight.  She denies any fevers, chills, sweats or rigors.  Denies any recurrent falls.  Blood pressure does appear relatively well controlled.  We will continue to monitor orthostatic vital signs.  ----------------------------------------------------------------------------------------------------------------------  [facility] Meds:  amLODIPine, 5 mg, Oral, Daily  budesonide, 0.5 mg, Nebulization, BID - RT  cefTRIAXone, 1 g, Intravenous, Q24H  docusate sodium, 100 mg, Oral, BID  donepezil, 5 mg, Oral, Daily  DULoxetine, 30 mg, Oral, Daily  furosemide, 20 mg, Oral, Daily  heparin (porcine), 5,000 Units, Subcutaneous, Q12H  insulin aspart, 0-9 Units, Subcutaneous, TID AC  insulin detemir, 40 Units, Subcutaneous, Daily  lidocaine, 1 patch, Transdermal, Q24H  midodrine, 5 mg, Oral, TID AC  montelukast, 10 mg, Oral, Daily  pantoprazole, 40 mg, Oral, QAM  potassium chloride, 10 mEq, Oral, Daily  sodium chloride, 10 mL, Intravenous, Q12H  sotalol, 80 mg, Oral, Q12H      sodium chloride, 75 mL/hr, Last Rate: 75 mL/hr (21 0102)      ----------------------------------------------------------------------------------------------------------------------  Vital Signs:  Temp:  [98 °F (36.7 °C)-98.4 °F (36.9 °C)] 98.2 °F (36.8 °C)  Heart Rate:  [69-84] 72  Resp:  [18-20] 19  BP: (108-150)/(51-72) 111/51      21  0505 21  0324 21  0125   Weight: 117 kg (257 lb) (admit weight pt not on floor 24 hrs) 115 kg (253 lb 6.4 oz) 116 kg (256 lb)     Body mass index is 45.35 kg/m².    Intake/Output Summary (Last 24 hours) at 2021 8701  Last data filed at 2021  1446  Gross per 24 hour   Intake 1320 ml   Output 1600 ml   Net -280 ml     Diet Regular; Consistent Carbohydrate, Renal, Cardiac  ----------------------------------------------------------------------------------------------------------------------  Physical exam:  Constitutional: Morbidly obese  female in no apparent distress.     HENT:  Head:  Normocephalic and atraumatic.  Mouth:  Moist mucous membranes.    Eyes:  Conjunctivae and EOM are normal.  Pupils are equal, round, and reactive to light.  No scleral icterus.    Neck:  Neck supple. No thyromegaly.  No JVD present.    Cardiovascular:  Regular rate and rhythm with no murmurs, rubs, clicks or gallops appreciated.  Pulmonary/Chest:  Clear to auscultation bilaterally with no crackles, wheezes or rhonchi appreciated.  Abdominal:  Soft. Nontender. Nondistended  Bowel sounds are normal in all four quadrants. No organomegally appreciated.   Musculoskeletal:   1+ pitting edema bilateral lower extremities, right lower extremity wrapped in Ace bandage.  Neurological:  Alert and oriented to person, place, and time. Cranial nerves II-XII intact with no focal deficits.  No facial droop.  No slurred speech.   Skin:  Warm and dry to palpation with no rashes or lesions appreciated.  Peripheral vascular:  2+ radial and pedal pulses in bilateral upper and lower extremities.  Psychiatric:  Alert and oriented x3, demonstrates appropriate judgement and insight.  ----------------------------------------------------------------------------------------------------------------------  Tele:    ----------------------------------------------------------------------------------------------------------------------  Results from last 7 days   Lab Units 05/31/21  0820 05/31/21 0212   TROPONIN T ng/mL <0.010 <0.010     Results from last 7 days   Lab Units 06/02/21  0222 05/31/21 0212 05/30/21  2230 05/30/21  2137   CRP mg/dL  --  1.45*  --  1.30*   LACTATE mmol/L  --   --  1.9   --    WBC 10*3/mm3 11.92* 13.51*  --  19.10*   HEMOGLOBIN g/dL 13.1 15.2  --  17.0*   HEMATOCRIT % 41.3 46.5  --  52.5*   MCV fL 94.5 91.9  --  91.5   MCHC g/dL 31.7 32.7  --  32.4   PLATELETS 10*3/mm3 255 284  --  358     Results from last 7 days   Lab Units 05/30/21  2303   PH, ARTERIAL pH units 7.487*   PO2 ART mm Hg 69.2*   PCO2, ARTERIAL mm Hg 40.6   HCO3 ART mmol/L 30.6*     Results from last 7 days   Lab Units 06/02/21  0222 05/31/21  0212 05/30/21  2137   SODIUM mmol/L 141 135* 136   POTASSIUM mmol/L 3.9 3.4* 3.8   CHLORIDE mmol/L 106 95* 93*   CO2 mmol/L 27.1 26.3 26.0   BUN mg/dL 15 13 13   CREATININE mg/dL 0.73 0.96 1.07*   EGFR IF NONAFRICN AM mL/min/1.73 78 57* 50*   CALCIUM mg/dL 8.8 9.2 10.1   GLUCOSE mg/dL 232* 324* 309*   ALBUMIN g/dL  --  3.78 4.17   BILIRUBIN mg/dL  --  0.9 1.0   ALK PHOS U/L  --  118* 133*   AST (SGOT) U/L  --  14 17   ALT (SGPT) U/L  --  13 17   Estimated Creatinine Clearance: 76.9 mL/min (by C-G formula based on SCr of 0.73 mg/dL).    No results found for: AMMONIA      No results found for: BLOODCX  Urine Culture   Date Value Ref Range Status   05/30/2021 >100,000 CFU/mL Gram Negative Bacilli (A)  Preliminary     No results found for: WOUNDCX  No results found for: STOOLCX    I have personally looked at the labs and they are summarized above.  ----------------------------------------------------------------------------------------------------------------------  Imaging Results (Last 24 Hours)     ** No results found for the last 24 hours. **        ----------------------------------------------------------------------------------------------------------------------  Assessment and Plan:    1.  Sepsis -present on admission, now resolved. Continue to treat underlying etiology which is felt to be secondary to acute cystitis    2.  Acute cystitis -urine culture with greater than 100,000 CFU of pansensitive E. coli, will continue Rocephin 1 g IV daily day #4    3.  Frequent falls  -likely secondary to significant orthostatic hypotension.  Appreciate cardiology recommendations, have started midodrine with improvement in orthostasis.    4.  Insulin-dependent type 2 diabetes mellitus -continue Accu-Cheks before every meal and nightly with sliding scale insulin    5.  General debility -continue PT/OT, has been approved for placement at the [facility] on discharge.      Disposition Case management consulted for assistance with placement.  Will likely be stable for discharge in the next 24 to 48 hours.        Rocky Méndez,   06/02/21  15:54 EDT

## 2021-06-02 NOTE — PLAN OF CARE
Goal Outcome Evaluation:  Plan of Care Reviewed With: patient   Pt resting in bed with no complaints. Shows no s/s of distress. Will continue with plan of care.

## 2021-06-02 NOTE — PROGRESS NOTES
LOS: 3 days     Name: Andreea Marshall  Age/Sex: 71 y.o. female  :  1950        PCP: Provider, No Known  REF: No ref. provider found    Principal Problem:    UTI (urinary tract infection)      Reason for follow-up: Orthostatic hypotension     Subjective       Subjective     Andreea Marshall is a 71 year female with a past medical history significant for dementia, essential hypertension, paroxysmal atrial fibrillation, COPD and diabetes mellitus type 2. Patient presented to the ED with complaints of frequent falls.    Interval History: Patient reports she is feeling better today. She got up to the bathroom today with no dizziness. Blood pressure has improved. No significant hypotension or hypertension. QTc stable at 471 ms on sotalol. In normal sinus rhythm.     Vital Signs  Temp:  [98.1 °F (36.7 °C)-98.4 °F (36.9 °C)] 98.2 °F (36.8 °C)  Heart Rate:  [69-84] 71  Resp:  [18-20] 18  BP: (108-150)/(60-73) 134/64     Vital Signs (last 72 hrs)        0700  -   0659  0700  -   0659  0700  -   0659  0700  -   0918   Most Recent    Temp (°F) 98 -  99.2    98 -  98.6    98.1 -  98.4       98.2 (36.8)    Heart Rate 85 -  102    78 -  135    69 -  84      71     71    Resp 14 -  18    18 -  20    18 -  20       18    /75 -  181/82    83/56 -  192/86    108/71 -  150/63      134/64     134/64    SpO2 (%) 93 -  96    90 -  97    90 -  97       93        Documented weights    21 1624 21 0023 21 0505 21 0324   Weight: 104 kg (230 lb) 117 kg (257 lb) 117 kg (257 lb) 115 kg (253 lb 6.4 oz)    21 0125   Weight: 116 kg (256 lb)      Body mass index is 45.35 kg/m².    Intake/Output Summary (Last 24 hours) at 2021 0918  Last data filed at 2021 0800  Gross per 24 hour   Intake 1560 ml   Output 1000 ml   Net 560 ml     Objective    Objective       Physical Exam:     General Appearance:    Alert, cooperative, in no acute distress   Head:     Normocephalic, without obvious abnormality, atraumatic   Eyes:            Conjunctivae and sclerae normal, no   icterus, no pallor, corneas clear.   Neck:   No adenopathy, supple, trachea midline, no thyromegaly, no   carotid bruit, no JVD   Lungs:     Clear to auscultation,respirations regular, even and                  unlabored    Heart:    Regular rhythm and normal rate, normal S1 and S2, no            murmur, no gallop, no rub, no click   Chest Wall:    No abnormalities observed   Abdomen:     Normal bowel sounds, no masses, no organomegaly, soft        non-tender, non-distended, no guarding, no rebound                tenderness   Extremities:   Moves all extremities well, no edema, no cyanosis, no             redness   Pulses:   Pulses palpable and equal bilaterally   Skin:   No bleeding, bruising or rash       Neurologic:   Alert, disoriented to place, oriented to self      Results review       Results Review:   Results from last 7 days   Lab Units 06/02/21  0222 05/31/21  0212 05/30/21  2137   WBC 10*3/mm3 11.92* 13.51* 19.10*   HEMOGLOBIN g/dL 13.1 15.2 17.0*   PLATELETS 10*3/mm3 255 284 358     Results from last 7 days   Lab Units 06/02/21  0222 05/31/21  0212 05/30/21  2137   SODIUM mmol/L 141 135* 136   POTASSIUM mmol/L 3.9 3.4* 3.8   CHLORIDE mmol/L 106 95* 93*   CO2 mmol/L 27.1 26.3 26.0   BUN mg/dL 15 13 13   CREATININE mg/dL 0.73 0.96 1.07*   CALCIUM mg/dL 8.8 9.2 10.1   GLUCOSE mg/dL 232* 324* 309*   ALT (SGPT) U/L  --  13 17   AST (SGOT) U/L  --  14 17     Results from last 7 days   Lab Units 05/31/21  0820 05/31/21  0212   TROPONIN T ng/mL <0.010 <0.010     No results found for: INR  No results found for: MG  No results found for: TSH, PSA, CHLPL, TRIG, HDL, LDL   Imaging Results (Last 48 Hours)     Procedure Component Value Units Date/Time    MRI Brain Without Contrast [[id number]] Collected: 06/01/21 1038     Updated: 06/01/21 1041    Narrative:      EXAM:    MR Head Without Intravenous  Contrast     EXAM DATE:    6/1/2021 10:01 AM     CLINICAL HISTORY:    positional nystagmus, recent dizziness and imbalance with frequent  falls, rule out posterior circulation/cerebellar stroke that may have  not bee evident on CT; S43.401A-Unspecified sprain of right shoulder  joint, initial encounter; S80.01XA-Contusion of right knee, initial  encounter; S16.1XXA-Strain of muscle, fascia and tendon at neck level,  initial encounter; N39.0-Urinary tract infection, site     TECHNIQUE:    Magnetic resonance images of the head/brain without intravenous  contrast in multiple planes.     COMPARISON:    CT 05/30/2021     FINDINGS:    Brain:  Generalized cerebral atrophy more pronounced in the frontal  lobe regions and is moderate in extent for patient age.  Mild chronic  small vessel ischemic disease.  No acute infarct identified.  No  hemorrhage.    Ventricles:  Unremarkable.  No ventriculomegaly.    Bones/joints:  Unremarkable.    Sinuses:  Unremarkable as visualized.  No acute sinusitis.    Mastoid air cells:  Unremarkable as visualized.  No mastoid effusion.    Orbits:  Unremarkable as visualized.       Impression:      1.  No acute intracranial findings.  2.  Specifically, no acute infarct identified.  3.  Generalized cerebral atrophy with areas of more pronounced atrophy  in the frontal lobes.  4.  Mild chronic small vessel ischemic disease.     This report was finalized on 6/1/2021 10:39 AM by Dr. Jericho Piper MD.           Echo   Results for orders placed during the hospital encounter of 05/30/21    Adult Transthoracic Echo Complete W/ Cont if Necessary Per Protocol    Interpretation Summary  · Normal left ventricular cavity size and wall thickness noted. All left ventricular wall segments contract normally  · Left ventricular ejection fraction appears to be 61 - 65%.  · Left ventricular diastolic function is consistent with (grade I) impaired relaxation.  · The aortic valve is not well visualized.  · There is  mild calcification of the mitral valve posterior leaflet(s). Mild mitral valve regurgitation is present. No significant mitral valve stenosis is present.  · There is no evidence of pericardial effusion. .     I reviewed the patient's new clinical results.    Telemetry: NSR 60-70 bpm          Medication Review:   amLODIPine, 5 mg, Oral, Daily  budesonide, 0.5 mg, Nebulization, BID - RT  cefTRIAXone, 1 g, Intravenous, Q24H  docusate sodium, 100 mg, Oral, BID  donepezil, 5 mg, Oral, Daily  DULoxetine, 30 mg, Oral, Daily  furosemide, 20 mg, Oral, Daily  heparin (porcine), 5,000 Units, Subcutaneous, Q12H  insulin aspart, 0-9 Units, Subcutaneous, TID AC  insulin detemir, 40 Units, Subcutaneous, Daily  lidocaine, 1 patch, Transdermal, Q24H  midodrine, 5 mg, Oral, TID AC  montelukast, 10 mg, Oral, Daily  pantoprazole, 40 mg, Oral, QAM  potassium chloride, 10 mEq, Oral, Daily  sodium chloride, 10 mL, Intravenous, Q12H  sotalol, 80 mg, Oral, Q12H        sodium chloride, 75 mL/hr, Last Rate: 75 mL/hr (06/01/21 0102)        Assessment      Assessment:  1. Frequent falls with significant postural hypotension  2. Paroxysmal atrial fibrillation with a [id number] score of 4  3. Dementia  4. Urosepsis  5. Essential hypertension  6. Morbid obesity  7. Possible obstructive sleep apnea  8. Diabetes mellitus type 2        Plan     Recommendations:  1. Patient currently tolerating sotalol well with acceptable QTC we will continue with the current management she is maintaining sinus rhythm  2. She will eventually require anticoagulation if her frequent falls being controlled which probably also related to the postural hypotension currently she is tolerating midodrine monitor her blood pressure response  3.  Regarding her essential hypertension blood pressure is controlled  4.  Is advised to lose weight  5.  Ambulate    3.       I discussed the patients findings and my recommendations with patient and family      Electronically signed by  PAT Tapia, 06/02/21, 9:19 AM EDT.  Electronically signed by Annalisa Cates MD, 06/02/21, 12:28 PM EDT.  Please note that portions of this note were completed with a voice recognition program.

## 2021-06-02 NOTE — PLAN OF CARE
Goal Outcome Evaluation:  Patient resting well during shift. She has periods of confusion. Patient reoriented GILBERTO POLO notified. No complaints or concerns at this time. No distress noted will continue to follow plan of care.

## 2021-06-03 LAB
GLUCOSE BLDC GLUCOMTR-MCNC: 200 MG/DL (ref 70–130)
GLUCOSE BLDC GLUCOMTR-MCNC: 259 MG/DL (ref 70–130)
GLUCOSE BLDC GLUCOMTR-MCNC: 271 MG/DL (ref 70–130)
GLUCOSE BLDC GLUCOMTR-MCNC: 325 MG/DL (ref 70–130)

## 2021-06-03 PROCEDURE — 93005 ELECTROCARDIOGRAM TRACING: CPT | Performed by: SPECIALIST

## 2021-06-03 PROCEDURE — 25010000002 CEFTRIAXONE PER 250 MG: Performed by: HOSPITALIST

## 2021-06-03 PROCEDURE — 93010 ELECTROCARDIOGRAM REPORT: CPT | Performed by: INTERNAL MEDICINE

## 2021-06-03 PROCEDURE — 63710000001 INSULIN DETEMIR PER 5 UNITS: Performed by: HOSPITALIST

## 2021-06-03 PROCEDURE — 99232 SBSQ HOSP IP/OBS MODERATE 35: CPT | Performed by: INTERNAL MEDICINE

## 2021-06-03 PROCEDURE — 25010000002 HEPARIN (PORCINE) PER 1000 UNITS: Performed by: HOSPITALIST

## 2021-06-03 PROCEDURE — 82962 GLUCOSE BLOOD TEST: CPT

## 2021-06-03 PROCEDURE — 93005 ELECTROCARDIOGRAM TRACING: CPT | Performed by: INTERNAL MEDICINE

## 2021-06-03 PROCEDURE — 99231 SBSQ HOSP IP/OBS SF/LOW 25: CPT | Performed by: INTERNAL MEDICINE

## 2021-06-03 PROCEDURE — 63710000001 INSULIN ASPART PER 5 UNITS: Performed by: HOSPITALIST

## 2021-06-03 PROCEDURE — 94799 UNLISTED PULMONARY SVC/PX: CPT

## 2021-06-03 RX ADMIN — INSULIN ASPART 6 UNITS: 100 INJECTION, SOLUTION INTRAVENOUS; SUBCUTANEOUS at 11:54

## 2021-06-03 RX ADMIN — INSULIN DETEMIR 40 UNITS: 100 INJECTION, SOLUTION SUBCUTANEOUS at 09:30

## 2021-06-03 RX ADMIN — HEPARIN SODIUM 5000 UNITS: 5000 INJECTION INTRAVENOUS; SUBCUTANEOUS at 08:26

## 2021-06-03 RX ADMIN — INSULIN ASPART 4 UNITS: 100 INJECTION, SOLUTION INTRAVENOUS; SUBCUTANEOUS at 08:25

## 2021-06-03 RX ADMIN — POTASSIUM CHLORIDE 10 MEQ: 750 TABLET, FILM COATED, EXTENDED RELEASE ORAL at 08:25

## 2021-06-03 RX ADMIN — SOTALOL HYDROCHLORIDE 80 MG: 80 TABLET ORAL at 20:41

## 2021-06-03 RX ADMIN — AMLODIPINE BESYLATE 5 MG: 5 TABLET ORAL at 08:25

## 2021-06-03 RX ADMIN — DONEPEZIL HYDROCHLORIDE 5 MG: 5 TABLET, FILM COATED ORAL at 08:25

## 2021-06-03 RX ADMIN — CARBIDOPA AND LEVODOPA 5 MG: 50; 200 TABLET, EXTENDED RELEASE ORAL at 11:54

## 2021-06-03 RX ADMIN — DOCUSATE SODIUM 100 MG: 100 CAPSULE, LIQUID FILLED ORAL at 08:25

## 2021-06-03 RX ADMIN — CEFTRIAXONE 1 G: 1 INJECTION, POWDER, FOR SOLUTION INTRAMUSCULAR; INTRAVENOUS at 20:41

## 2021-06-03 RX ADMIN — MONTELUKAST SODIUM 10 MG: 10 TABLET, COATED ORAL at 08:25

## 2021-06-03 RX ADMIN — ACETAMINOPHEN 650 MG: 325 TABLET ORAL at 11:53

## 2021-06-03 RX ADMIN — INSULIN ASPART 7 UNITS: 100 INJECTION, SOLUTION INTRAVENOUS; SUBCUTANEOUS at 18:26

## 2021-06-03 RX ADMIN — DULOXETINE HYDROCHLORIDE 30 MG: 30 CAPSULE, DELAYED RELEASE ORAL at 08:25

## 2021-06-03 RX ADMIN — FUROSEMIDE 20 MG: 20 TABLET ORAL at 08:25

## 2021-06-03 RX ADMIN — LIDOCAINE 1 PATCH: 50 PATCH CUTANEOUS at 08:26

## 2021-06-03 RX ADMIN — HEPARIN SODIUM 5000 UNITS: 5000 INJECTION INTRAVENOUS; SUBCUTANEOUS at 20:41

## 2021-06-03 RX ADMIN — SOTALOL HYDROCHLORIDE 80 MG: 80 TABLET ORAL at 08:25

## 2021-06-03 RX ADMIN — PANTOPRAZOLE SODIUM 40 MG: 40 TABLET, DELAYED RELEASE ORAL at 08:25

## 2021-06-03 RX ADMIN — CARBIDOPA AND LEVODOPA 5 MG: 50; 200 TABLET, EXTENDED RELEASE ORAL at 08:25

## 2021-06-03 NOTE — PLAN OF CARE
Pt resting at times. Complains of frequent cough, PRN medication given. Will continue to follow plan of care and monitor.

## 2021-06-03 NOTE — SIGNIFICANT NOTE
06/03/21 1322   OTHER   Discipline physical therapist   Rehab Time/Intention   Session Not Performed patient/family declined treatment  (pt. declines treatment, citing headache; notified RN.)

## 2021-06-03 NOTE — PROGRESS NOTES
LOS: 4 days     Name: Andreea Marshall  Age/Sex: 71 y.o. female  :  1950        PCP: Provider, No Known    Principal Problem:    UTI (urinary tract infection)      Admission Information: Andreea Marshall is a 71 y.o. female with a past medical history significant for dementia, essential hypertension, paroxysmal atrial fibrillation, COPD, and diabetes mellitus type 2. The patient initially presented to the ED with complaints of frequent falls.     Following for: Orthostatic hypotension    Telemetry Monitoring: sinus rhythm in the 70s    Interval history: The patient is resting in bed this morning. She states she has a headache. Denies any palpitations, chest pains, or shortness of breath. QTc stable at 470 ms on sotalol. Maintaining sinus rhythm. Currently not anticoagulated due to frequent falls. BP has improved with midodrine.    Subjective     ROS    Vital Signs  Vitals:    21 0310 21 0416 21 0621 21 0640   BP: 139/64   146/65   BP Location: Left arm   Left arm   Patient Position: Standing   Lying   Pulse: 82   67   Resp:    20   Temp:    98.1 °F (36.7 °C)   TempSrc:    Oral   SpO2:   94% 95%   Weight:  118 kg (260 lb 9.6 oz)     Height:         Body mass index is 46.16 kg/m².      Intake/Output Summary (Last 24 hours) at 6/3/2021 0933  Last data filed at 6/3/2021 0319  Gross per 24 hour   Intake 480 ml   Output 2200 ml   Net -1720 ml       Vitals reviewed.   Constitutional:       Appearance: Healthy appearance. Well-developed and not in distress.   HENT:      Head: Normocephalic and atraumatic.   Neck:      Vascular: No JVD.   Pulmonary:      Effort: Pulmonary effort is normal.      Breath sounds: Normal breath sounds. No wheezing. No rales.   Cardiovascular:      Normal rate. Regular rhythm.      Murmurs: There is no murmur.      . No S3 and S4 gallop.   Edema:     Peripheral edema absent.   Abdominal:      General: Bowel sounds are normal.      Palpations: Abdomen is soft.   Skin:      General: Skin is warm and dry.   Neurological:      Mental Status: Alert, oriented to person, place, and time and oriented to person, place and time.   Psychiatric:         Mood and Affect: Mood normal.         Behavior: Behavior normal.          Results Review:     Results from last 7 days   Lab Units 06/02/21 0222 05/31/21 0212 05/30/21 2137   WBC 10*3/mm3 11.92* 13.51* 19.10*   HEMOGLOBIN g/dL 13.1 15.2 17.0*   PLATELETS 10*3/mm3 255 284 358     Results from last 7 days   Lab Units 06/02/21 0222 05/31/21 0212 05/30/21  2137   SODIUM mmol/L 141 135* 136   POTASSIUM mmol/L 3.9 3.4* 3.8   CHLORIDE mmol/L 106 95* 93*   CO2 mmol/L 27.1 26.3 26.0   BUN mg/dL 15 13 13   CREATININE mg/dL 0.73 0.96 1.07*   CALCIUM mg/dL 8.8 9.2 10.1   GLUCOSE mg/dL 232* 324* 309*     Results from last 7 days   Lab Units 05/31/21  0820 05/31/21 0212   TROPONIN T ng/mL <0.010 <0.010           I reviewed the patient's new clinical results.  I reviewed the patient's new imaging results and agree with the interpretation.  I personally viewed and interpreted the patient's EKG/Telemetry data      Medication Review:   amLODIPine, 5 mg, Oral, Daily  budesonide, 0.5 mg, Nebulization, BID - RT  cefTRIAXone, 1 g, Intravenous, Q24H  docusate sodium, 100 mg, Oral, BID  donepezil, 5 mg, Oral, Daily  DULoxetine, 30 mg, Oral, Daily  furosemide, 20 mg, Oral, Daily  heparin (porcine), 5,000 Units, Subcutaneous, Q12H  insulin aspart, 0-9 Units, Subcutaneous, TID AC  insulin detemir, 40 Units, Subcutaneous, Daily  lidocaine, 1 patch, Transdermal, Q24H  midodrine, 5 mg, Oral, TID AC  montelukast, 10 mg, Oral, Daily  pantoprazole, 40 mg, Oral, QAM  potassium chloride, 10 mEq, Oral, Daily  sodium chloride, 10 mL, Intravenous, Q12H  sotalol, 80 mg, Oral, Q12H      sodium chloride, 75 mL/hr, Last Rate: 75 mL/hr (06/02/21 2026)        Assessment:  1. Frequent falls from significant orthostatic hypotension, seems to improved since patient has been on  midodrine.  2. Paroxysmal atrial fibrillation, has been maintaining sinus rhythm and has been initiated on sotalol on 6/1/2021.  Her QTC is acceptable and has been stable around 470 ms.  3. Essential hypertension with mildly elevated systolic blood pressure.  4. Morbid obesity with possible obstructive sleep apnea.  5. Type 2 diabetes mellitus.    Recommendations:  1. For her orthostatic hypotension, continue with midodrine at current dose and continue monitor her supine blood pressures.  2. For her atrial fibrillation, continue with sotalol at current dose and continue monitor the QTC for another 24 hours.  3. She is not a good candidate for chronic anticoagulation until we can make sure that she is not having significant dizziness and falls.  May consider restarting anticoagulation if she remains stable with ambulation with no more falls.      I discussed the patients findings and my recommendations with patient and Dr. Méndez.    Electronically signed by PAT Barcenas, 06/03/21, 9:33 AM EDT.    Electronically signed by Abdoul Yu MD, 06/03/21, 10:22 AM EDT.

## 2021-06-03 NOTE — PROGRESS NOTES
[facility] HOSPITALIST PROGRESS NOTE     Patient Identification:  Name:  Andreea Marshall  Age:  71 y.o.  Sex:  female  :  1950  MRN:  [id number]  Visit Number:  [id number]  Primary Care Provider:  Provider, No Known    Length of stay:  4    Chief complaint: None    Subjective:    Patient reports that she feels about the same as yesterday.  She has no complaints.  She has been able to go from sitting to standing without being dizzy or passing out.  Orthostatic vital signs appear much improved.  No new events overnight.  ----------------------------------------------------------------------------------------------------------------------  [facility] Meds:  amLODIPine, 5 mg, Oral, Daily  budesonide, 0.5 mg, Nebulization, BID - RT  cefTRIAXone, 1 g, Intravenous, Q24H  docusate sodium, 100 mg, Oral, BID  donepezil, 5 mg, Oral, Daily  DULoxetine, 30 mg, Oral, Daily  furosemide, 20 mg, Oral, Daily  heparin (porcine), 5,000 Units, Subcutaneous, Q12H  insulin aspart, 0-9 Units, Subcutaneous, TID AC  insulin detemir, 40 Units, Subcutaneous, Daily  lidocaine, 1 patch, Transdermal, Q24H  midodrine, 5 mg, Oral, TID AC  montelukast, 10 mg, Oral, Daily  pantoprazole, 40 mg, Oral, QAM  potassium chloride, 10 mEq, Oral, Daily  sodium chloride, 10 mL, Intravenous, Q12H  sotalol, 80 mg, Oral, Q12H      sodium chloride, 75 mL/hr, Last Rate: 75 mL/hr (21)      ----------------------------------------------------------------------------------------------------------------------  Vital Signs:  Temp:  [97.3 °F (36.3 °C)-98.3 °F (36.8 °C)] 98.1 °F (36.7 °C)  Heart Rate:  [61-82] 61  Resp:  [18-22] 20  BP: (122-146)/(54-79) 122/56      21  0324 21  0125 21  0416   Weight: 115 kg (253 lb 6.4 oz) 116 kg (256 lb) 118 kg (260 lb 9.6 oz)     Body mass index is 46.16 kg/m².    Intake/Output Summary (Last 24 hours) at 6/3/2021 152  Last data filed at 6/3/2021 0319  Gross per 24 hour   Intake  120 ml   Output 1350 ml   Net -1230 ml     Diet Regular; Consistent Carbohydrate, Renal, Cardiac  ----------------------------------------------------------------------------------------------------------------------  Physical exam:  Constitutional: Morbidly obese  female in no apparent distress.     HENT:  Head:  Normocephalic and atraumatic.  Mouth:  Moist mucous membranes.    Eyes:  Conjunctivae and EOM are normal.  Pupils are equal, round, and reactive to light.  No scleral icterus.    Neck:  Neck supple. No thyromegaly.  No JVD present.    Cardiovascular:  Regular rate and rhythm with no murmurs, rubs, clicks or gallops appreciated.  Pulmonary/Chest:  Clear to auscultation bilaterally with no crackles, wheezes or rhonchi appreciated.  Abdominal:  Soft. Nontender. Nondistended  Bowel sounds are normal in all four quadrants. No organomegally appreciated.   Musculoskeletal:   1+ pitting edema bilateral lower extremities, right lower extremity wrapped in Ace bandage.  Neurological:  Alert and oriented to person, place, and time. Cranial nerves II-XII intact with no focal deficits.  No facial droop.  No slurred speech.   Skin:  Warm and dry to palpation with no rashes or lesions appreciated.  Peripheral vascular:  2+ radial and pedal pulses in bilateral upper and lower extremities.  Psychiatric:  Alert and oriented x3, demonstrates appropriate judgement and insight.  ----------------------------------------------------------------------------------------------------------------------  Tele:    ----------------------------------------------------------------------------------------------------------------------  Results from last 7 days   Lab Units 05/31/21  0820 05/31/21 0212   TROPONIN T ng/mL <0.010 <0.010     Results from last 7 days   Lab Units 06/02/21  0222 05/31/21 0212 05/30/21 2230 05/30/21  2137   CRP mg/dL  --  1.45*  --  1.30*   LACTATE mmol/L  --   --  1.9  --    WBC 10*3/mm3 11.92* 13.51*   --  19.10*   HEMOGLOBIN g/dL 13.1 15.2  --  17.0*   HEMATOCRIT % 41.3 46.5  --  52.5*   MCV fL 94.5 91.9  --  91.5   MCHC g/dL 31.7 32.7  --  32.4   PLATELETS 10*3/mm3 255 284  --  358     Results from last 7 days   Lab Units 05/30/21  2303   PH, ARTERIAL pH units 7.487*   PO2 ART mm Hg 69.2*   PCO2, ARTERIAL mm Hg 40.6   HCO3 ART mmol/L 30.6*     Results from last 7 days   Lab Units 06/02/21  0222 05/31/21  0212 05/30/21  2137   SODIUM mmol/L 141 135* 136   POTASSIUM mmol/L 3.9 3.4* 3.8   CHLORIDE mmol/L 106 95* 93*   CO2 mmol/L 27.1 26.3 26.0   BUN mg/dL 15 13 13   CREATININE mg/dL 0.73 0.96 1.07*   EGFR IF NONAFRICN AM mL/min/1.73 78 57* 50*   CALCIUM mg/dL 8.8 9.2 10.1   GLUCOSE mg/dL 232* 324* 309*   ALBUMIN g/dL  --  3.78 4.17   BILIRUBIN mg/dL  --  0.9 1.0   ALK PHOS U/L  --  118* 133*   AST (SGOT) U/L  --  14 17   ALT (SGPT) U/L  --  13 17   Estimated Creatinine Clearance: 77.7 mL/min (by C-G formula based on SCr of 0.73 mg/dL).    No results found for: AMMONIA      No results found for: BLOODCX  Urine Culture   Date Value Ref Range Status   05/30/2021 >100,000 CFU/mL Gram Negative Bacilli (A)  Preliminary     No results found for: WOUNDCX  No results found for: STOOLCX    I have personally looked at the labs and they are summarized above.  ----------------------------------------------------------------------------------------------------------------------  Imaging Results (Last 24 Hours)     ** No results found for the last 24 hours. **        ----------------------------------------------------------------------------------------------------------------------  Assessment and Plan:    1.  Sepsis -present on admission, now resolved. Continue to treat underlying etiology which is felt to be secondary to acute cystitis    2.  Acute cystitis -urine culture with greater than 100,000 CFU of pansensitive E. coli, will complete Rocephin today    3.  Frequent falls -likely secondary to significant orthostatic  hypotension.  Orthostasis has dramatically improved with addition of midodrine 5 mg p.o. 3 times daily.  We will continue to work with physical therapy and Occupational Therapy.    4.  Insulin-dependent type 2 diabetes mellitus -continue Accu-Cheks before every meal and nightly with sliding scale insulin    5.  General debility -continue PT/OT, has been approved for placement at the [facility] on discharge.    6.  Paroxysmal A. Fib -we will continue to hold anticoagulation in the setting of frequent falls.  We will continue sotalol per cardiology recommendations.  Will monitor on telemetry for another 24 hours and if QTC remains stable, will likely continue sotalol on discharge.      Disposition likely discharge tomorrow.        Rocky Méndez,   06/03/21  15:28 EDT

## 2021-06-04 VITALS
BODY MASS INDEX: 46.21 KG/M2 | SYSTOLIC BLOOD PRESSURE: 154 MMHG | HEART RATE: 67 BPM | RESPIRATION RATE: 18 BRPM | HEIGHT: 63 IN | OXYGEN SATURATION: 96 % | DIASTOLIC BLOOD PRESSURE: 65 MMHG | TEMPERATURE: 98.4 F | WEIGHT: 260.8 LBS

## 2021-06-04 PROBLEM — N39.0 UTI (URINARY TRACT INFECTION): Status: RESOLVED | Noted: 2021-05-30 | Resolved: 2021-06-04

## 2021-06-04 LAB
ANION GAP SERPL CALCULATED.3IONS-SCNC: 9.6 MMOL/L (ref 5–15)
BACTERIA SPEC AEROBE CULT: NORMAL
BACTERIA SPEC AEROBE CULT: NORMAL
BUN SERPL-MCNC: 11 MG/DL (ref 8–23)
BUN/CREAT SERPL: 14.1 (ref 7–25)
CALCIUM SPEC-SCNC: 8.8 MG/DL (ref 8.6–10.5)
CHLORIDE SERPL-SCNC: 104 MMOL/L (ref 98–107)
CO2 SERPL-SCNC: 27.4 MMOL/L (ref 22–29)
CREAT SERPL-MCNC: 0.78 MG/DL (ref 0.57–1)
GFR SERPL CREATININE-BSD FRML MDRD: 72 ML/MIN/1.73
GLUCOSE BLDC GLUCOMTR-MCNC: 207 MG/DL (ref 70–130)
GLUCOSE BLDC GLUCOMTR-MCNC: 259 MG/DL (ref 70–130)
GLUCOSE BLDC GLUCOMTR-MCNC: 327 MG/DL (ref 70–130)
GLUCOSE SERPL-MCNC: 187 MG/DL (ref 65–99)
POTASSIUM SERPL-SCNC: 3.9 MMOL/L (ref 3.5–5.2)
SODIUM SERPL-SCNC: 141 MMOL/L (ref 136–145)

## 2021-06-04 PROCEDURE — 93005 ELECTROCARDIOGRAM TRACING: CPT | Performed by: SPECIALIST

## 2021-06-04 PROCEDURE — 99232 SBSQ HOSP IP/OBS MODERATE 35: CPT | Performed by: SPECIALIST

## 2021-06-04 PROCEDURE — 63710000001 INSULIN ASPART PER 5 UNITS: Performed by: HOSPITALIST

## 2021-06-04 PROCEDURE — 25010000002 HEPARIN (PORCINE) PER 1000 UNITS: Performed by: HOSPITALIST

## 2021-06-04 PROCEDURE — 94799 UNLISTED PULMONARY SVC/PX: CPT

## 2021-06-04 PROCEDURE — 99239 HOSP IP/OBS DSCHRG MGMT >30: CPT | Performed by: INTERNAL MEDICINE

## 2021-06-04 PROCEDURE — 63710000001 INSULIN DETEMIR PER 5 UNITS: Performed by: HOSPITALIST

## 2021-06-04 PROCEDURE — 82962 GLUCOSE BLOOD TEST: CPT

## 2021-06-04 PROCEDURE — 80048 BASIC METABOLIC PNL TOTAL CA: CPT | Performed by: INTERNAL MEDICINE

## 2021-06-04 PROCEDURE — 93010 ELECTROCARDIOGRAM REPORT: CPT | Performed by: INTERNAL MEDICINE

## 2021-06-04 RX ORDER — SOTALOL HYDROCHLORIDE 80 MG/1
80 TABLET ORAL EVERY 12 HOURS SCHEDULED
Qty: 180 TABLET | Refills: 1 | Status: SHIPPED | OUTPATIENT
Start: 2021-06-04 | End: 2021-06-04

## 2021-06-04 RX ORDER — MIDODRINE HYDROCHLORIDE 5 MG/1
5 TABLET ORAL
Qty: 90 TABLET | Refills: 1 | Status: SHIPPED | OUTPATIENT
Start: 2021-06-04 | End: 2021-12-14

## 2021-06-04 RX ORDER — SOTALOL HYDROCHLORIDE 80 MG/1
80 TABLET ORAL EVERY 12 HOURS SCHEDULED
Qty: 60 TABLET | Refills: 0 | Status: SHIPPED | OUTPATIENT
Start: 2021-06-04

## 2021-06-04 RX ADMIN — POTASSIUM CHLORIDE 10 MEQ: 750 TABLET, FILM COATED, EXTENDED RELEASE ORAL at 09:19

## 2021-06-04 RX ADMIN — INSULIN ASPART 4 UNITS: 100 INJECTION, SOLUTION INTRAVENOUS; SUBCUTANEOUS at 09:19

## 2021-06-04 RX ADMIN — DULOXETINE HYDROCHLORIDE 30 MG: 30 CAPSULE, DELAYED RELEASE ORAL at 09:19

## 2021-06-04 RX ADMIN — PANTOPRAZOLE SODIUM 40 MG: 40 TABLET, DELAYED RELEASE ORAL at 09:20

## 2021-06-04 RX ADMIN — CARBIDOPA AND LEVODOPA 5 MG: 50; 200 TABLET, EXTENDED RELEASE ORAL at 09:18

## 2021-06-04 RX ADMIN — DONEPEZIL HYDROCHLORIDE 5 MG: 5 TABLET, FILM COATED ORAL at 09:19

## 2021-06-04 RX ADMIN — FUROSEMIDE 20 MG: 20 TABLET ORAL at 09:20

## 2021-06-04 RX ADMIN — MONTELUKAST SODIUM 10 MG: 10 TABLET, COATED ORAL at 09:19

## 2021-06-04 RX ADMIN — INSULIN DETEMIR 40 UNITS: 100 INJECTION, SOLUTION SUBCUTANEOUS at 09:23

## 2021-06-04 RX ADMIN — AMLODIPINE BESYLATE 5 MG: 5 TABLET ORAL at 09:20

## 2021-06-04 RX ADMIN — SOTALOL HYDROCHLORIDE 80 MG: 80 TABLET ORAL at 09:19

## 2021-06-04 RX ADMIN — LIDOCAINE 1 PATCH: 50 PATCH CUTANEOUS at 09:19

## 2021-06-04 RX ADMIN — INSULIN ASPART 7 UNITS: 100 INJECTION, SOLUTION INTRAVENOUS; SUBCUTANEOUS at 11:52

## 2021-06-04 RX ADMIN — DOCUSATE SODIUM 100 MG: 100 CAPSULE, LIQUID FILLED ORAL at 09:19

## 2021-06-04 RX ADMIN — HEPARIN SODIUM 5000 UNITS: 5000 INJECTION INTRAVENOUS; SUBCUTANEOUS at 09:19

## 2021-06-04 NOTE — PROGRESS NOTES
LOS: 5 days     Name: Andreea Marshall  Age/Sex: 71 y.o. female  :  1950        PCP: Provider, No Known    Principal Problem:    UTI (urinary tract infection)      Admission Information: Andreea Marshall is a 71 y.o. female with a past medical history significant for dementia, essential hypertension, paroxysmal atrial fibrillation, COPD, and diabetes mellitus type 2. The patient initially presented to the ED with complaints of frequent falls. Since that time she has been initiated on sotalol.    Following for: Orthostatic hypotension    Telemetry Monitoring: sinus rhythm in the 70s to 80s    Interval history: The patient is maintaining sinus rhythm on sotalol. QTc today stable at 447 ms. Creatinine is 0.078. She is not anticoagulated at this time due to frequent falls. Blood pressure has been around 150/70 on midodrine. She denies shortness of breath. She has had only mild dizziness when ambulating yesterday and today.    Subjective     ROS    Vital Signs  Vitals:    21 0300 21 0301 21 0605 21 0654   BP: 154/74  136/68    BP Location: Right arm  Right arm    Patient Position: Lying  Lying    Pulse:   66    Resp:   18    Temp:   98.1 °F (36.7 °C)    TempSrc:   Oral    SpO2:   96% 95%   Weight:  118 kg (260 lb 12.8 oz)     Height:         Body mass index is 46.2 kg/m².      Intake/Output Summary (Last 24 hours) at 2021 0900  Last data filed at 2021 0524  Gross per 24 hour   Intake 1080 ml   Output 2500 ml   Net -1420 ml       Vitals reviewed.   Constitutional:       Appearance: Healthy appearance. Well-developed and not in distress.   HENT:      Head: Normocephalic and atraumatic.   Neck:      Vascular: No JVD.   Pulmonary:      Effort: Pulmonary effort is normal.      Breath sounds: Normal breath sounds. No wheezing. No rales.   Cardiovascular:      Normal rate. Regular rhythm.      Murmurs: There is no murmur.      . No S3 and S4 gallop.   Edema:     Peripheral edema absent.    Abdominal:      General: Bowel sounds are normal.      Palpations: Abdomen is soft.   Skin:     General: Skin is warm and dry.   Neurological:      Mental Status: Alert, oriented to person, place, and time and oriented to person, place and time.   Psychiatric:         Mood and Affect: Mood normal.         Behavior: Behavior normal.                Results Review:     Results from last 7 days   Lab Units 06/02/21  0222 05/31/21 0212 05/30/21  2137   WBC 10*3/mm3 11.92* 13.51* 19.10*   HEMOGLOBIN g/dL 13.1 15.2 17.0*   PLATELETS 10*3/mm3 255 284 358     Results from last 7 days   Lab Units 06/04/21  0137 06/02/21  0222 05/31/21 0212 05/30/21  2137   SODIUM mmol/L 141 141 135* 136   POTASSIUM mmol/L 3.9 3.9 3.4* 3.8   CHLORIDE mmol/L 104 106 95* 93*   CO2 mmol/L 27.4 27.1 26.3 26.0   BUN mg/dL 11 15 13 13   CREATININE mg/dL 0.78 0.73 0.96 1.07*   CALCIUM mg/dL 8.8 8.8 9.2 10.1   GLUCOSE mg/dL 187* 232* 324* 309*     Results from last 7 days   Lab Units 05/31/21  0820 05/31/21 0212   TROPONIN T ng/mL <0.010 <0.010             I reviewed the patient's new clinical results.  I reviewed the patient's new imaging results and agree with the interpretation.  I personally viewed and interpreted the patient's EKG/Telemetry data      Medication Review:   amLODIPine, 5 mg, Oral, Daily  budesonide, 0.5 mg, Nebulization, BID - RT  cefTRIAXone, 1 g, Intravenous, Q24H  docusate sodium, 100 mg, Oral, BID  donepezil, 5 mg, Oral, Daily  DULoxetine, 30 mg, Oral, Daily  furosemide, 20 mg, Oral, Daily  heparin (porcine), 5,000 Units, Subcutaneous, Q12H  insulin aspart, 0-9 Units, Subcutaneous, TID AC  insulin detemir, 40 Units, Subcutaneous, Daily  lidocaine, 1 patch, Transdermal, Q24H  midodrine, 5 mg, Oral, TID AC  montelukast, 10 mg, Oral, Daily  pantoprazole, 40 mg, Oral, QAM  potassium chloride, 10 mEq, Oral, Daily  sodium chloride, 10 mL, Intravenous, Q12H  sotalol, 80 mg, Oral, Q12H      sodium chloride, 75 mL/hr, Last Rate: 75  mL/hr (06/02/21 2026)        Assessment:  1. Frequent falls secondary to significant orthostatic hypotension, improving  2. Paroxysmal atrial fibrillation patient is maintaining normal sinus rhythm  3. Essential hypertension  4. Morbid obesity  5. Possible obstructive sleep apnea  6. Type 2 diabetes mellitus      Recommendations:  1. Patient is maintaining normal sinus rhythm on sotalol. QTC is 447 ms continue with the current dose of sotalol  2. Anticoagulation has not been started because of frequent falls now she is improving if her gait is more steady she should be considered for anticoagulation chronically  3. Ambulate continue with the physical therapy she is much better symptom wise  4. We'll follow the distance please call if assistance is needed patient will need to be followed up with cardiology office as an outpatient    I discussed the patients findings and my recommendations with patient and family  Electronically signed by Annalisa Cates MD, 06/04/21, 11:54 AM EDT.  Electronically signed by PAT Barcenas, 06/04/21, 9:04 AM EDT.

## 2021-06-04 NOTE — NURSING NOTE
Report called to Vikki MENDOZA at [facility] @ 15:25. Verbalized understanding with no questions

## 2021-06-04 NOTE — DISCHARGE PLACEMENT REQUEST
"Ivan Marshall (71 y.o. Female)     Date of Birth Social Security Number Address Home Phone MRN    1950  [address] [place] [address] [phone] [id number]    Voodoo Marital Status          Unknown        Admission Date Admission Type Admitting Provider Attending Provider Department, Room/Bed    5/30/21 Emergency Frankie Wood MD Mullins, Thomas Anthony, DO [address], 3319/1P    Discharge Date Discharge Disposition Discharge Destination         Skilled Nursing Facility (DC - External)              Attending Provider: Rocky Méndez DO    Allergies: No Known Allergies    Isolation: None   Infection: None   Code Status: No CPR    Ht: 160 cm (63\")   Wt: 118 kg (260 lb 12.8 oz)    Admission Cmt: None   Principal Problem: UTI (urinary tract infection) [N39.0]                 Active Insurance as of 5/30/2021     Primary Coverage     Payor Plan Insurance Group Employer/Plan Group    MEDICARE MEDICARE A & B      Payor Plan Address Payor Plan Phone Number Payor Plan Fax Number Effective Dates    [address] [phone]  10/1/2001 - None Entered    [facility] [id number]       Subscriber Name Subscriber Birth Date Member ID       IVAN MARSHALL 1950 [id number]           Secondary Coverage     Payor Plan Insurance Group Employer/Plan Group    [facility] [id number]     Payor Plan Address Payor Plan Phone Number Payor Plan Fax Number Effective Dates    [address]   1/1/2021 - None Entered    [facility] [id number]       Subscriber Name Subscriber Birth Date Member ID       IVAN MARSHALL 1950 [id number]                 Emergency Contacts      (Rel.) Home Phone Work Phone Mobile Phone    JS CARLOS (Daughter) [phone] -- --            Emergency Contact Information     Name Relation Home Work Mobile    JS CARLOS Daughter [phone]            Insurance Information                MEDICARE/MEDICARE A & B Phone: " [phone]    Subscriber: Andreea Marshall Subscriber#: [id number]    Group#:  Precert#:         [employer] Phone:     Subscriber: Andreea Marshall Subscriber#: [id number]    Group#: [id number] Precert#:           Treatment Team  Chat With All Active Members    Provider Relationship Specialty Contact    Rocky Méndez DO  Attending, Physician of Record Internal Medicine  [phone]    Jamari Read  Patient Care Technician --     Frankie Wood MD  Consulting Physician Hospitalist  [phone]    Jeffery Zee RN  Registered Nurse --  [phone]    Kiki Harris, RRT  Respiratory Therapist --  [phone]    Annalisa Cates MD  Consulting Physician Cardiology  [phone]          Problem List         Codes Noted - Resolved       Non-Hospital    Dementia (CMS/HCC) (Chronic) ICD-10-CM: F03.90  ICD-9-CM: 294.20 5/31/2021 - Present    Paroxysmal atrial fibrillation (CMS/HCC) (Chronic) ICD-10-CM: I48.0  ICD-9-CM: 427.31 5/31/2021 - Present    COPD (chronic obstructive pulmonary disease) (CMS/HCC) (Chronic) ICD-10-CM: J44.9  ICD-9-CM: 496 5/31/2021 - Present    Osteoporosis with fracture (Chronic) ICD-10-CM: M80.80XA  ICD-9-CM: 733.10, 733.00 5/31/2021 - Present    Diverticulosis (Chronic) ICD-10-CM: K57.90  ICD-9-CM: 562.10 5/31/2021 - Present    GERD without esophagitis (Chronic) ICD-10-CM: K21.9  ICD-9-CM: 530.81 5/31/2021 - Present    Type 2 diabetes mellitus, with long-term current use of insulin (CMS/HCC) (Chronic) ICD-10-CM: E11.9, Z79.4  ICD-9-CM: 250.00, V58.67 5/31/2021 - Present             History & Physical      Kat Villafuerte PA-C at 05/30/21 2350     Attestation signed by Frankie Wood MD at 05/31/21 2988    I have seen and examined the patient independently from Kat Villafuerte PA-C, and have reviewed Kat's findings, assessment and plan in the H&P below. Patient and daughter at bedside reiterate recent history of frequent falls,  some of which are associated with dizziness and loss of balance. No urinary symptoms except foul smelling urine per daughter. Patient does technically meet sepsis criteria, felt to be most likely secondary to UTI for which rocephin has been ordered. Follow up urine and blood cultures. Underlying infection could certainly be contributing to dizziness and frequent falls. However, also has afib and unfortunately is not anticoagulated despite a high [id number] score because of developing vaginal bleeding while on anticoagulation in the past. Therefore at high risk of stroke. Nothing seen on CT head, but she had reported nystagmus with change in position in bed while checking orthostatic vitals so could have posterior circulation/cerebellar embolic stroke not seen on CT. Alternatively could have BPPV. Will evaluate further with MRI brain. Otherwise agree with pre-syncope workup listed below including monitor for arrhythmias on telemetry, trending troponin, and evaluating further with ECHO and carotid doppler in the morning. For diabetes, glucose glucose is currently 309. Patient takes lantus every morning. Will give some correctional novolog now and start SSI in the morning. Wlil start levemir in place of lantus once home meds are reconciled. Ultimately daughter is requesting SNF placement so PT and OT will evaluate and  will begin working on placement.                       [facility]  History & Physical    Patient Identification:  Name:  Andreea Marshall  Age:  71 y.o.  Sex:  female  :  1950  MRN:  [id number]   Visit Number:  [id number]  Primary Care Physician:  Provider, No Known    Subjective     2021   Chief complaint: Frequent falls    History of presenting illness:      Andreea Marshall is a 71 y.o. female with past medical history significant for dementia, essential hypertension, paroxysmal atrial fibrillation, COPD, osteoporosis, diverticulosis, GERD,  and insulin-dependent type II diabetes mellitus.    Patient presents to [facility] Emergency Department due to more frequent falls. Patient's daughter, Dulce Horta, is present at bedside to assist in providing the history and physical. She reports that her mother has been living with her for about a year and a half and is requiring total care with her activities of daily living. She states that her mother does have a history of falls over the past few years, but has been falling more frequently over the past few months. She states that she has fallen a total of six times over the past two months. She reports that sometimes it is due to her being very unsteady on her feet and sometimes it is due to her getting dizzy. She has a walker, cane, and wheelchair that she uses at home, but only uses these devices as needed. She states that today is the worst fall that her mother has sustained. The patient states that she was ambulating to the bathroom and started to experience some dizziness and fell. She did sustain injuries to her right leg including her knee and ankle and has a small abrasion just under her nose. She denies having a syncopal episode or experiencing chest pain prior to her fall. She reports some shortness of breath, but no worse than usual due to her underlying COPD. She has had a cough that is nonproductive that has worsened over the past few days. She also reports a headache. Denies nasal drainage, sore throat, abdominal pain, nausea, vomiting, or diarrhea. Patient is alert and oriented to person, time, date of birth, and who the president is. She is disoriented to place. The daughter reports this is her baseline orientation status. Daughter states that she is having a hard time caring for her mother as she has a full-time job and her mother is growing weaker and requiring assistance. I did discuss with her the possibility of placement at a skilled nursing facility and she is agreeable to  this.     Upon arrival to the ED, vital signs were temperaure 98.1, heart rate 85, respirations 14, /82, SpO2 94% on room air. ABG shows pH 7.487, pCO2 40.6, pO2 69.2, HCO3 30.6 with oxygen saturation of 95 on room air. CMP shows glucose 309, chloride 93, anion gap 17, creatinine 1.07, eGFR 50, alkaline phosphatase 133, otherwise within normal limits. CBC shows WBC 19.10, RBC 5.74, hemoglobin 17, hematocrit 52.5, otherwise within normal limits. UA shows trace ketones, positive nitrites, 1+ leukocytes, 2+ protein, 6-12 RBC, too numerous to count WBC, 4+ bacteria, and 3-6 squamous epithelial cells. C-RP 1.3. Lactate 1.9. Negative acetone. Pending blood cultures x 2 and urine culture. X-ray right shoulder shows slightly flattening appearance of the humeral head which may be chronic in nature and somewhat due to projectional artifact but fracture cannot be excluded and no dislocation. X-ray right knee shows moderate tricompartmental arthrosis with small joint effusion without fracture dislocation. X-ray of right ankle shows no acute fracture dislocation. CT head shows no acute intracranial abnormality. CT cervical spine shows no acute cervical spine injury with multilevel degenerative changes. CT lumbar spine shows no acute injury with multilevel degenerative changes. CT right upper extremity shows no acute fracture dislocation of the right shoulder; chronic flattening of the right humeral head likely secondary to remote posttraumatic deformity with mild glenohumeral arthrosis; moderate AC joint arthrosis. CT abdomen pelvis shows no renal or ureteral stones and no hydronephrosis; moderate stool burden and mild small bowel ileus suspected without obstruction.    Known Emergency Department medications received prior to my evaluation included IV Rocephin 1 g. Emergency Department Room location at the time of my evaluation was 408.      ---------------------------------------------------------------------------------------------------------------------   Review of Systems   Constitutional: Positive for activity change (worsening debility). Negative for chills, fatigue and fever.   HENT: Negative for congestion, postnasal drip, rhinorrhea, sneezing and sore throat.    Eyes: Negative for discharge and redness.   Respiratory: Positive for cough (nonproductive) and shortness of breath (at baseline). Negative for apnea and wheezing.    Cardiovascular: Positive for leg swelling (chronic;baseline). Negative for chest pain and palpitations.   Gastrointestinal: Negative for abdominal distention, abdominal pain, diarrhea, nausea and vomiting.   Endocrine: Negative for polydipsia, polyphagia and polyuria.   Genitourinary: Negative for difficulty urinating, dysuria, frequency, hematuria and urgency.        Reports foul-smelling urine   Musculoskeletal: Positive for arthralgias (right ankle/knee) and gait problem. Negative for myalgias.   Skin: Negative for color change, pallor and wound.   Neurological: Positive for dizziness, weakness and headaches. Negative for seizures and syncope.   Psychiatric/Behavioral: Negative for agitation and behavioral problems. The patient is not nervous/anxious.         ---------------------------------------------------------------------------------------------------------------------   Past Medical History:   Diagnosis Date   • COPD (chronic obstructive pulmonary disease) (CMS/[facility])    • Dementia (CMS/[facility])    • Diverticulosis    • Essential hypertension    • GERD (gastroesophageal reflux disease)    • Osteoporosis    • Paroxysmal atrial fibrillation (CMS/HCC)    • Type II diabetes mellitus (CMS/HCC)      Past Surgical History:   Procedure Laterality Date   • APPENDECTOMY     • CHOLECYSTECTOMY     • TONSILLECTOMY       Family History   Problem Relation Age of Onset   • Cerebral aneurysm Mother    • Heart failure Father    •  Diabetes Father      Social History     Socioeconomic History   • Marital status:      Spouse name: Not on file   • Number of children: Not on file   • Years of education: Not on file   • Highest education level: Not on file   Tobacco Use   • Smoking status: Never Smoker   • Smokeless tobacco: Never Used   Substance and Sexual Activity   • Alcohol use: Never   • Drug use: Never   • Sexual activity: Defer     ---------------------------------------------------------------------------------------------------------------------   Allergies:  Patient has no known allergies.  ---------------------------------------------------------------------------------------------------------------------   Home medications:    Medications below are reported home medications pulling from within the system; at this time, these medications have not been reconciled unless otherwise specified and are in the verification process for further verifcation as current home medications.  Medications Prior to Admission   Medication Sig Dispense Refill Last Dose   • budesonide-formoterol (SYMBICORT) 80-4.5 MCG/ACT inhaler Inhale 2 puffs 2 (Two) Times a Day.   5/30/2021 at Unknown time   • donepezil (ARICEPT) 5 MG tablet Take 5 mg by mouth Daily.   5/30/2021 at Unknown time   • DULoxetine (CYMBALTA) 30 MG capsule Take 30 mg by mouth Daily.   5/30/2021 at Unknown time   • esomeprazole (nexIUM) 40 MG capsule Take 40 mg by mouth Every Morning Before Breakfast.   5/30/2021 at Unknown time   • ezetimibe 10 MG tablet 10 mg, simvastatin 40 MG tablet 40 mg Take 1 tablet by mouth Daily.   5/30/2021 at Unknown time   • furosemide (LASIX) 10 MG half tablet Take 10 mg by mouth Daily.   5/30/2021 at Unknown time   • insulin glargine (LANTUS, SEMGLEE) 100 UNIT/ML injection Inject 80 Units under the skin into the appropriate area as directed Daily.   5/30/2021 at Unknown time   • losartan (COZAAR) 50 MG tablet Take 50 mg by mouth Daily.   5/30/2021 at  Unknown time   • montelukast (SINGULAIR) 10 MG tablet Take 10 mg by mouth Daily.   5/30/2021 at Unknown time   • potassium chloride 10 MEQ CR tablet Take 10 mEq by mouth Daily.   5/30/2021 at Unknown time   • tiZANidine (ZANAFLEX) 2 MG half tablet Take 2 mg by mouth Daily.   5/30/2021 at Unknown time   • traZODone (DESYREL) 100 MG tablet Take 100 mg by mouth Every Night.   5/29/2021 at Unknown time   • triamterene-hydrochlorothiazide (MAXZIDE-25) 37.5-25 MG per tablet Take 1 tablet by mouth Daily.   5/30/2021 at Unknown time   • albuterol sulfate  (90 Base) MCG/ACT inhaler Inhale 2 puffs Every 6 (Six) Hours As Needed for Wheezing.   Unknown at Unknown time   • benzonatate (TESSALON) 100 MG capsule Take 100 mg by mouth 3 (Three) Times a Day As Needed for Cough.   Unknown at Unknown time       Hospital Scheduled Meds:  cefTRIAXone, 1 g, Intravenous, Q24H  docusate sodium, 100 mg, Oral, BID  heparin (porcine), 5,000 Units, Subcutaneous, Q12H  insulin aspart, 0-9 Units, Subcutaneous, TID AC  sodium chloride, 10 mL, Intravenous, Q12H      sodium chloride, 75 mL/hr, Last Rate: 75 mL/hr (05/31/21 0056)        Current listed hospital scheduled medications may not yet reflect those currently placed in orders that are signed and held awaiting patient's arrival to floor.   ---------------------------------------------------------------------------------------------------------------------     Objective     Vital Signs:  Temp:  [98.1 °F (36.7 °C)-99.2 °F (37.3 °C)] 99.2 °F (37.3 °C)  Heart Rate:  [] 102  Resp:  [14-18] 18  BP: (144-181)/() 163/69      05/30/21  1624 05/31/21  0023   Weight: 104 kg (230 lb) 117 kg (257 lb)     Body mass index is 45.53 kg/m².  ---------------------------------------------------------------------------------------------------------------------       Physical Exam  Constitutional:       Appearance: Normal appearance. She is obese.      Comments: Patient is resting in bed at the  time of my examination. She is in no acute distress.    HENT:      Head: Normocephalic and atraumatic.      Right Ear: External ear normal.      Left Ear: External ear normal.      Nose: Nose normal.      Mouth/Throat:      Mouth: Mucous membranes are moist.      Pharynx: Oropharynx is clear.   Eyes:      Extraocular Movements: Extraocular movements intact.      Conjunctiva/sclera: Conjunctivae normal.      Pupils: Pupils are equal, round, and reactive to light.   Cardiovascular:      Rate and Rhythm: Normal rate and regular rhythm.      Pulses: Normal pulses.      Heart sounds: Normal heart sounds. No murmur heard.   No friction rub. No gallop.    Pulmonary:      Effort: Pulmonary effort is normal. No respiratory distress.      Breath sounds: Normal breath sounds. No wheezing, rhonchi or rales.   Abdominal:      General: Abdomen is flat. Bowel sounds are normal. There is no distension.      Palpations: Abdomen is soft.      Tenderness: There is no abdominal tenderness. There is no guarding.   Musculoskeletal:         General: Swelling present. Normal range of motion.      Cervical back: Normal range of motion and neck supple.      Right lower leg: Edema present.      Left lower leg: Edema present.        Legs:    Skin:     General: Skin is warm and dry.      Findings: No erythema or rash.          Neurological:      General: No focal deficit present.      Mental Status: She is alert. Mental status is at baseline. She is disoriented.      Comments: Patient is alert and oriented to person, time, date of birth, and president. She is disoriented to place. Daughter reports this is her baseline orientation. No focal deficits appreciated. Facial features symmetrical. No tongue deviation.  strength 5/5 bilaterally. No upper extremity drift. Lower extremity strength 5/5 bilaterally.    Psychiatric:         Mood and Affect: Mood normal.         Behavior: Behavior normal.         Thought Content: Thought content normal.          ---------------------------------------------------------------------------------------------------------------------  EKG:    Will obtain baseline EKG.         Telemetry:    Telemetry shows normal sinus rhythm with HR 95 and SpO2 95% on room air.     I have personally looked at both the EKG and the telemetry strips.  ---------------------------------------------------------------------------------------------------------------------   Results from last 7 days   Lab Units 05/30/21 2230 05/30/21 2137   CRP mg/dL  --  1.30*   LACTATE mmol/L 1.9  --    WBC 10*3/mm3  --  19.10*   HEMOGLOBIN g/dL  --  17.0*   HEMATOCRIT %  --  52.5*   MCV fL  --  91.5   MCHC g/dL  --  32.4   PLATELETS 10*3/mm3  --  358     Results from last 7 days   Lab Units 05/30/21  2303   PH, ARTERIAL pH units 7.487*   PO2 ART mm Hg 69.2*   PCO2, ARTERIAL mm Hg 40.6   HCO3 ART mmol/L 30.6*     Results from last 7 days   Lab Units 05/30/21 2137   SODIUM mmol/L 136   POTASSIUM mmol/L 3.8   CHLORIDE mmol/L 93*   CO2 mmol/L 26.0   BUN mg/dL 13   CREATININE mg/dL 1.07*   EGFR IF NONAFRICN AM mL/min/1.73 50*   CALCIUM mg/dL 10.1   GLUCOSE mg/dL 309*   ALBUMIN g/dL 4.17   BILIRUBIN mg/dL 1.0   ALK PHOS U/L 133*   AST (SGOT) U/L 17   ALT (SGPT) U/L 17   Estimated Creatinine Clearance: 57.8 mL/min (A) (by C-G formula based on SCr of 1.07 mg/dL (H)).  No results found for: AMMONIA          No results found for: HGBA1C  No results found for: TSH, FREET4  No results found for: PREGTESTUR, PREGSERUM, HCG, HCGQUANT  Pain Management Panel    There is no flowsheet data to display.           ---------------------------------------------------------------------------------------------------------------------  Imaging Results (Last 7 Days)     Procedure Component Value Units Date/Time    CT Abdomen Pelvis Stone Protocol [[id number]] Collected: 05/30/21 2343     Updated: 05/30/21 2345    Narrative:      CT Abdomen Pelvis WO    INDICATION:   Flank pain. Urinary  tract infection. Fall today.    TECHNIQUE:   CT of the abdomen and pelvis without IV contrast. Coronal and sagittal reconstructions were obtained.  Radiation dose reduction techniques included automated exposure control or exposure modulation based on body size. Count of known CT and cardiac nuc  med studies performed in previous 12 months: 4.     COMPARISON:   None available.    FINDINGS:  There is some motion degradation. There is mild scarring/atelectasis in the lung bases. There is atherosclerotic disease with no aortic aneurysm. Gallbladder is surgically absent. No biliary obstruction is seen. 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 No renal or ureteral stones are identified, and there is no hydronephrosis. Unenhanced solid abdominal organs are otherwise unremarkable. Urinary bladder is normal. Solid pelvic organs are normal.    Moderate stool burden in the colon may indicate constipation. There is no evidence of acute colitis. The appendix is not clearly identified, but there is no evidence of acute appendicitis. There is some gas in small bowel loops suggesting a mild ileus.  There is a diverticulum in the third portion of the duodenum. No free fluid or adenopathy is identified. No acute fractures are seen.      Impression:        1. No renal or ureteral stones. No hydronephrosis.  2. Moderate stool burden in the colon may indicate constipation.  3. Mild small bowel ileus suspected. No bowel obstruction.  4. Cholecystectomy without biliary obstruction.      Signer Name: Deven Rios MD   Signed: 5/30/2021 11:43 PM   Workstation Name: [email or web address]    [facility]    CT Head Without Contrast [[id number]] Collected: 05/30/21 2302     Updated: 05/30/21 2304    Narrative:      CT Head WO    HISTORY:   Generalized weakness today. Fall today.    TECHNIQUE:   Routine noncontrast head CT. Coronal and sagittal reformatted images. Radiation dose reduction  techniques included automated exposure control or exposure modulation based on body size. Count of known CT and cardiac nuc med studies performed in previous  12 months: 0.     COMPARISON:   None.    FINDINGS:   No hemorrhage, acute infarction, mass lesion, or abnormal extra-axial fluid collection. No midline shift or focal mass effect. Ventricular system is normal in size and configuration. Moderate generalized atrophy and chronic small vessel disease  throughout the supratentorial white matter. No acute osseous abnormality. Visualized paranasal sinuses are clear. Visualized mastoid air cells are clear.      Impression:      No acute intracranial abnormality. Age-related senescent changes.          Signer Name: Venu Roque MD   Signed: 5/30/2021 11:02 PM   Workstation Name: [id number]    [facility]    CT Upper Extremity Right Without Contrast [[id number]] Collected: 05/30/21 1947     Updated: 05/30/21 1949    Narrative:      CT UE RT WO    INDICATION:    Right shoulder pain status post fall today.    TECHNIQUE:   CT of the right shoulder without IV contrast. Coronal and sagittal reconstructions were obtained.  Radiation dose reduction techniques included automated exposure control or exposure modulation based on body size. Count of known CT and cardiac nuc med  studies performed in previous 12 months: 0.     COMPARISON:    Right shoulder x-rays performed the same date.    FINDINGS:  There is no evidence of acute fracture or dislocation of the right shoulder. There is chronic flattening of the right humeral head, likely secondary to remote posttraumatic deformity. Mild degenerative change of the glenohumeral joint. Moderate  degenerative change of the acromioclavicular joint. No glenohumeral effusion. Periarticular soft tissues are unremarkable. Included lung fields are clear.      Impression:        1. No acute fracture or dislocation of the right shoulder.  2. Chronic flattening of the  right humeral head, likely secondary to remote posttraumatic deformity. Mild glenohumeral arthrosis.  3. Moderate AC joint arthrosis.    Signer Name: Venu Roque MD   Signed: 5/30/2021 7:47 PM   Workstation Name: [email or web address]    [facility]    XR Ankle 3+ View Right [[id number]] Collected: 05/30/21 1812     Updated: 05/30/21 1814    Narrative:      CR Ankle Min 3 Vws RT    INDICATION:   Right ankle pain status post fall today.    COMPARISON:   None.    FINDINGS:  3 view(s) of the right ankle.  No fracture or dislocation. Prominent plantar calcaneal spur. Ossification at the Achilles tendon insertion. Mild degenerative changes throughout the midfoot. No bone erosion or destruction. No foreign body.      Impression:      No acute fracture or dislocation.    Signer Name: Venu Roque MD   Signed: 5/30/2021 6:12 PM   Workstation Name: [facility]    Radiology [facility]    XR Knee 4+ View Right [[id number]] Collected: 05/30/21 1810     Updated: 05/30/21 1812    Narrative:      CR Knee Min 4 Vws RT    INDICATION:   Anterior right knee pain status post fall today.    COMPARISON:   None available.    FINDINGS:  4 view(s) of the right knee.  No acute fracture or dislocation. Small joint effusion. Moderate degenerative changes in all 3 compartments. No bone erosion or destruction.  No foreign body.      Impression:      Moderate tricompartmental arthrosis. Small joint effusion. No acute fracture or dislocation.    Signer Name: Venu Roque MD   Signed: 5/30/2021 6:10 PM   Workstation Name: [facility]    [facility]    XR Shoulder 2+ View Right [[id number]] Collected: 05/30/21 1809     Updated: 05/30/21 1811    Narrative:      CR Shoulder Comp Min 2 Vws RT    INDICATION:   Right shoulder pain status post fall today.    COMPARISON:   None available.    FINDINGS:   3 views of the right shoulder.  Slightly flattened appearance of the humeral head of  indeterminate age. This may also be slightly projectional. However, a humeral head fracture cannot be excluded. No glenohumeral dislocation. Moderate degenerative  changes of the acromioclavicular joint.  No foreign body.      Impression:      Slightly flattened appearance of the humeral head, indeterminate age. This may be chronic in nature and somewhat due to projectional artifact. However, a humeral head fracture cannot be excluded on the exam. No glenohumeral dislocation. CT of the right  shoulder may be considered if further characterization is warranted.    Signer Name: Venu Roque MD   Signed: 5/30/2021 6:09 PM   Workstation Name: [employer]    Radiology [facility]    CT Lumbar Spine Without Contrast [[id number]] Collected: 05/30/21 1725     Updated: 05/30/21 1728    Narrative:      CT Spine Lumbar WO    INDICATION:    Back pain status post fall today.    TECHNIQUE:   CT of the lumbar spine without IV contrast. Coronal and sagittal reconstructions were obtained.  Radiation dose reduction techniques included automated exposure control or exposure modulation based on body size. Count of known CT and cardiac nuc med  studies performed in previous 12 months: 0.     COMPARISON:    None available.    FINDINGS:  No acute fractures. Lumbar vertebral body heights and alignment are adequately maintained. Mild to moderate multilevel discogenic degenerative changes, most prominent at L2-3. Moderate multilevel facet degeneration. Mild central canal stenosis at  multiple levels. Paravertebral soft tissues are unremarkable. Included sacrum and SI joints are intact.      Impression:        1. No acute lumbar spine injury.  2. Multilevel degenerative changes, detailed above.    Signer Name: Venu Roque MD   Signed: 5/30/2021 5:25 PM   Workstation Name: [employer]    [facility]    CT Cervical Spine Without Contrast [[id number]] Collected: 05/30/21 1710     Updated: 05/30/21 1712     Narrative:      CT Spine Cervical WO    INDICATION:   Neck pain status post fall today.    TECHNIQUE:   CT of the cervical spine without IV contrast. Coronal and sagittal reconstructions were obtained.  Radiation dose reduction techniques included automated exposure control or exposure modulation based on body size. Count of known CT and cardiac nuc med  studies performed in previous 12 months: 0.     COMPARISON:  None available.    FINDINGS:  No acute fracture. Vertebral body heights and alignment are normally maintained. Atlantoaxial relationship and cervicothoracic junction are within normal limits. Mild to moderate multilevel degenerative disc changes and facet arthropathy. Moderate  central canal stenosis at C5-6. Paravertebral soft tissues are unremarkable. Included lung fields are clear.      Impression:        1. No acute cervical spine injury.  2. Multilevel degenerative changes, detailed above.    Signer Name: Venu Roque MD   Signed: 5/30/2021 5:10 PM   Workstation Name: [email or web address]    [facility] [place]        I have personally reviewed the above radiology images and read the final radiology report on 05/31/21  ---------------------------------------------------------------------------------------------------------------------  Assessment / Plan     Active Hospital Problems    Diagnosis  POA   • **UTI (urinary tract infection) [N39.0]  Yes       ASSESSMENT/PLAN:  Sepsis present on admission secondary to acute UTI (HR >90, WBC 19.10)  - UA shows 3+ glucose, trace ketones, positive nitrites, 1+ leukocytes, 2+ protein, 6-12 RBC, too numerous to count WBC, 4+ bacteria, and 3-6 squamous epithelium.   - CXR ordered.    - Pending urine culture and blood cultures x 2.   - Received 1 g IV Rocephin in ED; will continue IV Rocephin on admission while awaiting cultures.   - IV NS @ 75 mL/hr.   - Continuous cardiac monitoring ordered.   - Repeat AM CBC.     Frequent Falls associated with  dizziness and worsening debility, present on admission   - Daughter reports that patient has fell a total of 6 times over the past 2 months and sometimes due to dizziness and also unsteady gait.   - Imaging done in ED did not reveal any acute fracture or dislocation.  - CT head shows no acute abnormality.   - Obtain AM transthoracic echocardiogram.  - US bilateral carotids ordered.    - Orthostatic vitals ordered q shift; initial orthostatics negative.   - Continuous cardiac monitoring ordered.   - PT/OT consulted.     Presumed Acute on CKD, unknown baseline  - Creatinine 1.07; daughter was unsure of baseline and no previous labs available for review.   - CT abdomen/pelvis shows no obstructive uropathy.   - IV NS @ 75 mL/hr ordered.   - Repeat AM CMP.   - Monitor intake/output.     Mild Small Bowel Ileus  - CT abdomen/pelvis shows moderate stool burden and mild small bowel ileus.   - No complaints of abdominal pain or tenderness or nausea and vomiting.   - Bowel regimen ordered with daily colace and PRN miralax; if no BM with bowel regimen then suppository ordered.     Elevated Alkaline Phosphatase   - Alkaline phosphatase 133.   - Repeat AM CMP.     Elevated Anion-gap   - AG 17; UA showed ketones; negative acetone.   - Repeat AM CMP.     Insulin-dependent Type II Diabetes Mellitus with Hyperglycemia  - Glucose 309.   - Obtain hemoglobin A1c.   - Accuchecks qAC and qHS ordered.   - Moderate-dose SSI ordered; titrate as necessary.  - Hypoglycemic protocol in place as needed.     Paroxysmal Atrial Fibrillation   - [id number] score approximately 4 with age, gender and history of HTN and DM; takes daily aspirin, but no on anticoagulation as patient had developed postmenopausal bleeding while on anticoagulation so this was stopped.   - EKG ordered.   - Continuous cardiac monitoring ordered.     Essential Hypertension  - BP is slightly elevated.   - Continue to monitor per hospital protocol.   - Will review home  medications once reconciled by pharmacy.     COPD, without acute exacerbation  - ABG shows mild alkalosis; otherwise unremarkable.   - Supplemental oxygen ordered; titrate to maintain SpO2 90-95%.   - Continuous pulse oximetry ordered.     Osteoporosis  - Supportive care.   - PT/OT consulted.     GERD  - Review H2 blocker/PPI regimen once reconciled by pharmacy.   ----------  -DVT prophylaxis: SQ Heparin   -Activity: Up with assistance; fall precautions  -Expected length of stay:   INPATIENT status due to the need for care which can only be reasonably provided in an hospital setting such as aggressive/expedited ancillary services and/or consultation services, the necessity for IV medications, close physician monitoring and/or the possible need for procedures.  In such, I feel patient's risk for adverse outcomes and need for care warrant INPATIENT evaluation and predict the patient's care encounter to likely last beyond 2 midnights.    High risk secondary to sepsis secondary to acute UTI and frequent falls associated with dizziness and worsening debility     Disposition: Daughter and POA, Dulce Horta, is requesting that patient be placed in SNF upon discharge.  has been consulted.     CODE Status: DNR/DNI. Discussed with daughter and MÓNICAJESSICA.    Kat Villafuerte PA-C  05/31/21  01:14 EDT    ---------------------------------------------------------------------------------------------------------------------           Electronically signed by Frankie Wood MD at 05/31/21 0137       Vital Signs (last day)     Date/Time   Temp   Temp src   Pulse   Resp   BP   Patient Position   SpO2    06/04/21 1033   98.4 (36.9)   Oral   67   18   154/65   Lying   96    06/04/21 0654   --   --   --   --   --   --   95    06/04/21 0605   98.1 (36.7)   Oral   66   18   136/68   Lying   96    06/04/21 0300   --   --   --   --   154/74   Lying   --    06/04/21 0250   --   --   --   --   170/74   --   --    06/04/21  0245   --   --   --   --   165/72   Standing   --    06/04/21 0240   --   --   --   --   153/73   Sitting   --    06/04/21 0232   97.7 (36.5)   Oral   57   16   157/71   Lying   95    06/03/21 1829   --   --   63   18   --   --   95    06/03/21 1806   98.3 (36.8)   Oral   64   18   169/70   Lying   90    06/03/21 1426   98.1 (36.7)   Oral   61   20   122/56   Lying   96    06/03/21 1012   --   --   76   --   124/70   Standing   --    06/03/21 1009   --   --   74   --   140/79   Sitting   --    06/03/21 1006   98.3 (36.8)   Oral   68   20   129/54   Lying   96    06/03/21 0939   --   --   --   --   --   --   92    06/03/21 0640   98.1 (36.7)   Oral   67   20   146/65   Lying   95    06/03/21 0621   --   --   --   --   --   --   94    06/03/21 0310   --   --   82   --   139/64   Standing   --    06/03/21 0305   --   --   74   --   141/72   Sitting   93    06/03/21 0300   97.9 (36.6)   Oral   70   20   142/55   Lying   94              Lines, Drains & Airways    Active LDAs     Name:   Placement date:   Placement time:   Site:   Days:    Peripheral IV 05/30/21 2309 Right Antecubital   05/30/21 2309    Antecubital   4    External Urinary Catheter   05/31/21    0547    --   4                  Current Facility-Administered Medications   Medication Dose Route Frequency Provider Last Rate Last Admin   • acetaminophen (TYLENOL) tablet 650 mg  650 mg Oral Q6H PRN Kat Villafuerte PA-C   650 mg at 06/03/21 1153   • amLODIPine (NORVASC) tablet 5 mg  5 mg Oral Daily Rhoda Win DO   5 mg at 06/04/21 0920   • benzonatate (TESSALON) capsule 100 mg  100 mg Oral TID PRN Rhoda Win DO   100 mg at 06/01/21 2202   • bisacodyl (DULCOLAX) suppository 10 mg  10 mg Rectal Once PRN Frankie Wood MD       • budesonide (PULMICORT) nebulizer solution 0.5 mg  0.5 mg Nebulization BID - RT Rhoda Win DO       • cefTRIAXone (ROCEPHIN) 1 g/100 mL 0.9% NS (MBP)  1 g Intravenous Q24H Frankie Wood  MD Eric   1 g at 06/03/21 2041   • dextrose (D50W) 25 g/ 50mL Intravenous Solution 25 g  25 g Intravenous Q15 Min PRN Frankie Wood MD       • dextrose (D50W) 25 g/ 50mL Intravenous Solution 25 g  25 g Intravenous Q15 Min PRN Frankie Wood MD       • dextrose (GLUTOSE) oral gel 15 g  15 g Oral Q15 Min PRN Frankie Wood MD       • dextrose (GLUTOSE) oral gel 15 g  15 g Oral Q15 Min PRN Frankie Wood MD       • docusate sodium (COLACE) capsule 100 mg  100 mg Oral BID Frankie Wood MD   100 mg at 06/04/21 0919   • donepezil (ARICEPT) tablet 5 mg  5 mg Oral Daily Rhoda Win DO   5 mg at 06/04/21 0919   • DULoxetine (CYMBALTA) DR capsule 30 mg  30 mg Oral Daily Rhoda Win DO   30 mg at 06/04/21 0919   • furosemide (LASIX) tablet 20 mg  20 mg Oral Daily Rhoda Win DO   20 mg at 06/04/21 0920   • glucagon (human recombinant) (GLUCAGEN DIAGNOSTIC) injection 1 mg  1 mg Subcutaneous Q15 Min PRN Frankie Wood MD       • glucagon (human recombinant) (GLUCAGEN DIAGNOSTIC) injection 1 mg  1 mg Subcutaneous Q15 Min PRN Frankie Wood MD       • guaiFENesin-dextromethorphan (ROBITUSSIN DM) 100-10 MG/5ML syrup 10 mL  10 mL Oral Q6H PRN Kat Villafuerte PA-C   10 mL at 06/02/21 2232   • heparin (porcine) 5000 UNIT/ML injection 5,000 Units  5,000 Units Subcutaneous Q12H Frankie Wood MD   5,000 Units at 06/04/21 0919   • insulin aspart (novoLOG) injection 0-9 Units  0-9 Units Subcutaneous TID AC Frankie Wood MD   7 Units at 06/04/21 1152   • insulin detemir (LEVEMIR) injection 40 Units  40 Units Subcutaneous Daily Frankie Wood MD   40 Units at 06/04/21 0923   • lidocaine (LIDODERM) 5 % 1 patch  1 patch Transdermal Q24H Rhoda Win DO   1 patch at 06/04/21 0919   • midodrine (PROAMATINE) tablet 5 mg  5 mg Oral TID AC Annalisa Cates MD   5 mg at 06/04/21 0918   • montelukast (SINGULAIR) tablet 10  mg  10 mg Oral Daily Rhoda Win DO   10 mg at 06/04/21 0919   • pantoprazole (PROTONIX) EC tablet 40 mg  40 mg Oral QAM Rhoda Win DO   40 mg at 06/04/21 0920   • polyethylene glycol (MIRALAX) packet 17 g  17 g Oral BID PRN Frankie Wood MD       • potassium chloride (K-DUR,KLOR-CON) ER tablet 10 mEq  10 mEq Oral Daily Rhoda Win DO   10 mEq at 06/04/21 0919   • sodium chloride 0.9 % flush 10 mL  10 mL Intravenous PRN Frankie Wood MD       • sodium chloride 0.9 % flush 10 mL  10 mL Intravenous Q12H Frankie Wood MD   10 mL at 06/02/21 0850   • sodium chloride 0.9 % flush 10 mL  10 mL Intravenous PRN Frankie Wood MD       • sodium chloride 0.9 % infusion  75 mL/hr Intravenous Continuous Frankie Wood MD 75 mL/hr at 06/02/21 2026 75 mL/hr at 06/02/21 2026   • sotalol (BETAPACE) tablet 80 mg  80 mg Oral Q12H Annalisa Cates MD   80 mg at 06/04/21 0919   • tiZANidine (ZANAFLEX) tablet 2 mg  2 mg Oral Q12H PRN Rhoda Win DO   2 mg at 06/01/21 0213       Lab Results (last 24 hours)     Procedure Component Value Units Date/Time    POC Glucose Once [[id number]]  (Abnormal) Collected: 06/04/21 1035    Specimen: Blood Updated: 06/04/21 1047     Glucose 327 mg/dL     POC Glucose Once [[id number]]  (Abnormal) Collected: 06/04/21 0700    Specimen: Blood Updated: 06/04/21 0707     Glucose 207 mg/dL     Basic Metabolic Panel [[id number]]  (Abnormal) Collected: 06/04/21 0137    Specimen: Blood Updated: 06/04/21 0245     Glucose 187 mg/dL      BUN 11 mg/dL      Creatinine 0.78 mg/dL      Sodium 141 mmol/L      Potassium 3.9 mmol/L      Chloride 104 mmol/L      CO2 27.4 mmol/L      Calcium 8.8 mg/dL      eGFR Non African Amer 72 mL/min/1.73      BUN/Creatinine Ratio 14.1     Anion Gap 9.6 mmol/L     Narrative:      GFR Normal >60  Chronic Kidney Disease <60  Kidney Failure <15      Blood Culture - Blood, Arm, Right [[id number]] Collected: 05/30/21  2228    Specimen: Blood from Arm, Right Updated: 06/03/21 2246     Blood Culture No growth at 4 days    Blood Culture - Blood, Arm, Left [[id number]] Collected: 05/30/21 2228    Specimen: Blood from Arm, Left Updated: 06/03/21 2246     Blood Culture No growth at 4 days        Orders (last 24 hrs)      Start     Ordered    06/04/21 1306  Discontinue IV  Once      06/04/21 1307    06/04/21 1304  Discharge patient  Once      06/04/21 1307    06/04/21 1048  POC Glucose Once  Once      06/04/21 1035    06/04/21 0945  ECG 12 Lead  Once      06/04/21 0945    06/04/21 0707  POC Glucose Once  Once      06/04/21 0700    06/04/21 0600  Basic Metabolic Panel  Morning Draw      06/03/21 1530    06/04/21 0000  midodrine (PROAMATINE) 5 MG tablet  3 Times Daily Before Meals      06/04/21 1307    06/04/21 0000  sotalol (BETAPACE) 80 MG tablet  Every 12 Hours Scheduled,   Status:  Discontinued      06/04/21 1307    06/04/21 0000  Discharge Follow-up with Specialty: Cardiology; 2 Weeks      06/04/21 1307    06/04/21 0000  sotalol (BETAPACE) 80 MG tablet  Every 12 Hours Scheduled      06/04/21 1335    06/03/21 2126  ECG 12 Lead  Once      06/03/21 2125    06/03/21 1818  POC Glucose Once  Once      06/03/21 1811    06/03/21 1708  POC Glucose Once  Once      06/03/21 1656    06/01/21 2100  sotalol (BETAPACE) tablet 80 mg  Every 12 Hours Scheduled      06/01/21 1614    06/01/21 1730  midodrine (PROAMATINE) tablet 5 mg  3 Times Daily Before Meals      06/01/21 1614    06/01/21 0930  budesonide (PULMICORT) nebulizer solution 0.5 mg  2 Times Daily - RT      06/01/21 0143    06/01/21 0900  amLODIPine (NORVASC) tablet 5 mg  Daily      06/01/21 0143    06/01/21 0900  donepezil (ARICEPT) tablet 5 mg  Daily      06/01/21 0143    06/01/21 0900  DULoxetine (CYMBALTA) DR capsule 30 mg  Daily      06/01/21 0143 06/01/21 0900  furosemide (LASIX) tablet 20 mg  Daily      06/01/21 0143 06/01/21 0900  montelukast (SINGULAIR) tablet 10 mg  Daily       06/01/21 0143    06/01/21 0900  potassium chloride (K-DUR,KLOR-CON) ER tablet 10 mEq  Daily      06/01/21 0143    06/01/21 0700  pantoprazole (PROTONIX) EC tablet 40 mg  Every Morning      06/01/21 0143    06/01/21 0507  guaiFENesin-dextromethorphan (ROBITUSSIN DM) 100-10 MG/5ML syrup 10 mL  Every 6 Hours PRN      06/01/21 0507    06/01/21 0230  lidocaine (LIDODERM) 5 % 1 patch  Every 24 Hours Scheduled      06/01/21 0143    06/01/21 0143  tiZANidine (ZANAFLEX) tablet 2 mg  Every 12 Hours PRN      06/01/21 0143    06/01/21 0142  benzonatate (TESSALON) capsule 100 mg  3 Times Daily PRN      06/01/21 0143    05/31/21 2200  cefTRIAXone (ROCEPHIN) 1 g/100 mL 0.9% NS (MBP)  Every 24 Hours      05/31/21 0025    05/31/21 0900  insulin detemir (LEVEMIR) injection 40 Units  Daily      05/31/21 0140    05/31/21 0730  insulin aspart (novoLOG) injection 0-9 Units  3 Times Daily Before Meals      05/30/21 2344 05/31/21 0700  POC Glucose 4x Daily AC & at Bedtime  4 Times Daily Before Meals & at Bedtime     Comments: If bedtime blood glucose is greater than 350 mg/dl, call MD.      05/30/21 2344 05/31/21 0700  POC Glucose 4x Daily AC & at Bedtime  4 Times Daily Before Meals & at Bedtime     Comments: If bedtime blood glucose is greater than 350 mg/dl, call MD.      05/31/21 0139    05/31/21 0400  Strict Intake & Output  Every 4 Hours      05/31/21 0025    05/31/21 0233  acetaminophen (TYLENOL) tablet 650 mg  Every 6 Hours PRN      05/31/21 0233    05/31/21 0200  heparin (porcine) 5000 UNIT/ML injection 5,000 Units  Every 12 Hours Scheduled      05/31/21 0025    05/31/21 0138  glucagon (human recombinant) (GLUCAGEN DIAGNOSTIC) injection 1 mg  Every 15 Minutes PRN      05/31/21 0139    05/31/21 0138  dextrose (GLUTOSE) oral gel 15 g  Every 15 Minutes PRN      05/31/21 0139    05/31/21 0138  dextrose (D50W) 25 g/ 50mL Intravenous Solution 25 g  Every 15 Minutes PRN      05/31/21 0139    05/31/21 0115  sodium chloride 0.9 %  flush 10 mL  Every 12 Hours Scheduled      05/31/21 0025    05/31/21 0115  sodium chloride 0.9 % infusion  Continuous      05/31/21 0025    05/31/21 0100  docusate sodium (COLACE) capsule 100 mg  2 Times Daily      05/30/21 2350 05/31/21 0047  Orthostatic Blood Pressure  Every Shift      05/31/21 0046    05/31/21 0026  Daily Weights  Daily      05/31/21 0025    05/31/21 0025  sodium chloride 0.9 % flush 10 mL  As Needed      05/31/21 0025    05/30/21 2350  bisacodyl (DULCOLAX) suppository 10 mg  Once As Needed      05/30/21 2350 05/30/21 2349  polyethylene glycol (MIRALAX) packet 17 g  2 Times Daily PRN      05/30/21 2350 05/30/21 2343  dextrose (GLUTOSE) oral gel 15 g  Every 15 Minutes PRN      05/30/21 2344 05/30/21 2343  dextrose (D50W) 25 g/ 50mL Intravenous Solution 25 g  Every 15 Minutes PRN      05/30/21 2344 05/30/21 2343  glucagon (human recombinant) (GLUCAGEN DIAGNOSTIC) injection 1 mg  Every 15 Minutes PRN      05/30/21 2344 05/30/21 2217  sodium chloride 0.9 % flush 10 mL  As Needed      05/30/21 2217 05/30/21 0000  HYDROcodone-acetaminophen (NORCO) 5-325 MG per tablet  Every 6 Hours PRN,   Status:  Discontinued      05/30/21 1958    Unscheduled  Up With Assistance  As Needed      05/31/21 0025    --  insulin glargine (LANTUS, SEMGLEE) 100 UNIT/ML injection  Daily      05/30/21 2357    --  tiZANidine (ZANAFLEX) 2 MG half tablet  2 times daily      05/30/21 2357    --  montelukast (SINGULAIR) 10 MG tablet  Daily      05/30/21 2357    --  ezetimibe 10 MG tablet 10 mg, simvastatin 40 MG tablet 40 mg  Nightly      05/30/21 2357    --  benzonatate (TESSALON) 100 MG capsule  3 Times Daily PRN      05/30/21 2357    --  potassium chloride 10 MEQ CR tablet  Daily      05/30/21 2357    --  DULoxetine (CYMBALTA) 30 MG capsule  Daily      05/30/21 2357    --  triamterene-hydrochlorothiazide (MAXZIDE-25) 37.5-25 MG per tablet  Daily      05/30/21 2357    --  esomeprazole (nexIUM) 40 MG capsule   Every Morning Before Breakfast      21    --  albuterol sulfate  (90 Base) MCG/ACT inhaler  Every 6 Hours PRN      21    --  donepezil (ARICEPT) 5 MG tablet  Daily      21    --  furosemide (LASIX) 20 MG tablet  Daily      21 1026    --  budesonide (PULMICORT) 180 MCG/ACT inhaler  2 Times Daily - RT      21 102    --  amLODIPine (NORVASC) 5 MG tablet  Daily      21 1026    --  SCANNED - TELEMETRY        21 0000    --  SCANNED - TELEMETRY        21 0000                Operative/Procedure Notes (last 24 hours) (Notes from 21 142 through 21)    No notes of this type exist for this encounter.            Physician Progress Notes (last 24 hours) (Notes from 21 through 21)      Annalisa Cates MD at 21 0859             LOS: 5 days     Name: Andreea Marshall  Age/Sex: 71 y.o. female  :  1950        PCP: Provider, No Known    Principal Problem:    UTI (urinary tract infection)      Admission Information: Andreea Marshall is a 71 y.o. female with a past medical history significant for dementia, essential hypertension, paroxysmal atrial fibrillation, COPD, and diabetes mellitus type 2. The patient initially presented to the ED with complaints of frequent falls. Since that time she has been initiated on sotalol.    Following for: Orthostatic hypotension    Telemetry Monitoring: sinus rhythm in the 70s to 80s    Interval history: The patient is maintaining sinus rhythm on sotalol. QTc today stable at 447 ms. Creatinine is 0.078. She is not anticoagulated at this time due to frequent falls. Blood pressure has been around 150/70 on midodrine. She denies shortness of breath. She has had only mild dizziness when ambulating yesterday and today.    Subjective     ROS    Vital Signs  Vitals:    21 0300 21 0301 21 0605 21 0654   BP: 154/74  136/68    BP Location: Right arm  Right arm    Patient  Position: Lying  Lying    Pulse:   66    Resp:   18    Temp:   98.1 °F (36.7 °C)    TempSrc:   Oral    SpO2:   96% 95%   Weight:  118 kg (260 lb 12.8 oz)     Height:         Body mass index is 46.2 kg/m².      Intake/Output Summary (Last 24 hours) at 6/4/2021 0900  Last data filed at 6/4/2021 0524  Gross per 24 hour   Intake 1080 ml   Output 2500 ml   Net -1420 ml       Vitals reviewed.   Constitutional:       Appearance: Healthy appearance. Well-developed and not in distress.   HENT:      Head: Normocephalic and atraumatic.   Neck:      Vascular: No JVD.   Pulmonary:      Effort: Pulmonary effort is normal.      Breath sounds: Normal breath sounds. No wheezing. No rales.   Cardiovascular:      Normal rate. Regular rhythm.      Murmurs: There is no murmur.      . No S3 and S4 gallop.   Edema:     Peripheral edema absent.   Abdominal:      General: Bowel sounds are normal.      Palpations: Abdomen is soft.   Skin:     General: Skin is warm and dry.   Neurological:      Mental Status: Alert, oriented to person, place, and time and oriented to person, place and time.   Psychiatric:         Mood and Affect: Mood normal.         Behavior: Behavior normal.                Results Review:     Results from last 7 days   Lab Units 06/02/21 0222 05/31/21 0212 05/30/21 2137   WBC 10*3/mm3 11.92* 13.51* 19.10*   HEMOGLOBIN g/dL 13.1 15.2 17.0*   PLATELETS 10*3/mm3 255 284 358     Results from last 7 days   Lab Units 06/04/21  0137 06/02/21 0222 05/31/21 0212 05/30/21 2137   SODIUM mmol/L 141 141 135* 136   POTASSIUM mmol/L 3.9 3.9 3.4* 3.8   CHLORIDE mmol/L 104 106 95* 93*   CO2 mmol/L 27.4 27.1 26.3 26.0   BUN mg/dL 11 15 13 13   CREATININE mg/dL 0.78 0.73 0.96 1.07*   CALCIUM mg/dL 8.8 8.8 9.2 10.1   GLUCOSE mg/dL 187* 232* 324* 309*     Results from last 7 days   Lab Units 05/31/21  0820 05/31/21 0212   TROPONIN T ng/mL <0.010 <0.010             I reviewed the patient's new clinical results.  I reviewed the patient's  new imaging results and agree with the interpretation.  I personally viewed and interpreted the patient's EKG/Telemetry data      Medication Review:   amLODIPine, 5 mg, Oral, Daily  budesonide, 0.5 mg, Nebulization, BID - RT  cefTRIAXone, 1 g, Intravenous, Q24H  docusate sodium, 100 mg, Oral, BID  donepezil, 5 mg, Oral, Daily  DULoxetine, 30 mg, Oral, Daily  furosemide, 20 mg, Oral, Daily  heparin (porcine), 5,000 Units, Subcutaneous, Q12H  insulin aspart, 0-9 Units, Subcutaneous, TID AC  insulin detemir, 40 Units, Subcutaneous, Daily  lidocaine, 1 patch, Transdermal, Q24H  midodrine, 5 mg, Oral, TID AC  montelukast, 10 mg, Oral, Daily  pantoprazole, 40 mg, Oral, QAM  potassium chloride, 10 mEq, Oral, Daily  sodium chloride, 10 mL, Intravenous, Q12H  sotalol, 80 mg, Oral, Q12H      sodium chloride, 75 mL/hr, Last Rate: 75 mL/hr (06/02/21 2026)        Assessment:  1. Frequent falls secondary to significant orthostatic hypotension, improving  2. Paroxysmal atrial fibrillation patient is maintaining normal sinus rhythm  3. Essential hypertension  4. Morbid obesity  5. Possible obstructive sleep apnea  6. Type 2 diabetes mellitus      Recommendations:  1. Patient is maintaining normal sinus rhythm on sotalol. QTC is 447 ms continue with the current dose of sotalol  2. Anticoagulation has not been started because of frequent falls now she is improving if her gait is more steady she should be considered for anticoagulation chronically  3. Ambulate continue with the physical therapy she is much better symptom wise  4. We'll follow the distance please call if assistance is needed patient will need to be followed up with cardiology office as an outpatient    I discussed the patients findings and my recommendations with patient and family  Electronically signed by Annalisa Cates MD, 06/04/21, 11:54 AM EDT.  Electronically signed by PAT Barcenas, 06/04/21, 9:04 AM EDT.        Electronically signed by Annalisa Cates MD  at 21 1157     Rocky Méndez, DO at 21 1528              [facility]     Patient Identification:  Name:  Andreea Marshall  Age:  71 y.o.  Sex:  female  :  1950  MRN:  [id number]  Visit Number:  [id number]  Primary Care Provider:  Provider, No Known    Length of stay:  4    Chief complaint: None    Subjective:    Patient reports that she feels about the same as yesterday.  She has no complaints.  She has been able to go from sitting to standing without being dizzy or passing out.  Orthostatic vital signs appear much improved.  No new events overnight.  ----------------------------------------------------------------------------------------------------------------------  Current [facility] Meds:  amLODIPine, 5 mg, Oral, Daily  budesonide, 0.5 mg, Nebulization, BID - RT  cefTRIAXone, 1 g, Intravenous, Q24H  docusate sodium, 100 mg, Oral, BID  donepezil, 5 mg, Oral, Daily  DULoxetine, 30 mg, Oral, Daily  furosemide, 20 mg, Oral, Daily  heparin (porcine), 5,000 Units, Subcutaneous, Q12H  insulin aspart, 0-9 Units, Subcutaneous, TID AC  insulin detemir, 40 Units, Subcutaneous, Daily  lidocaine, 1 patch, Transdermal, Q24H  midodrine, 5 mg, Oral, TID AC  montelukast, 10 mg, Oral, Daily  pantoprazole, 40 mg, Oral, QAM  potassium chloride, 10 mEq, Oral, Daily  sodium chloride, 10 mL, Intravenous, Q12H  sotalol, 80 mg, Oral, Q12H      sodium chloride, 75 mL/hr, Last Rate: 75 mL/hr (21)      ----------------------------------------------------------------------------------------------------------------------  Vital Signs:  Temp:  [97.3 °F (36.3 °C)-98.3 °F (36.8 °C)] 98.1 °F (36.7 °C)  Heart Rate:  [61-82] 61  Resp:  [18-22] 20  BP: (122-146)/(54-79) 122/56      21  0324 21  0125 21  0416   Weight: 115 kg (253 lb 6.4 oz) 116 kg (256 lb) 118 kg (260 lb 9.6 oz)     Body mass index is 46.16 kg/m².    Intake/Output Summary (Last 24 hours) at  6/3/2021 1528  Last data filed at 6/3/2021 0319  Gross per 24 hour   Intake 120 ml   Output 1350 ml   Net -1230 ml     Diet Regular; Consistent Carbohydrate, Renal, Cardiac  ----------------------------------------------------------------------------------------------------------------------  Physical exam:  Constitutional: Morbidly obese  female in no apparent distress.     HENT:  Head:  Normocephalic and atraumatic.  Mouth:  Moist mucous membranes.    Eyes:  Conjunctivae and EOM are normal.  Pupils are equal, round, and reactive to light.  No scleral icterus.    Neck:  Neck supple. No thyromegaly.  No JVD present.    Cardiovascular:  Regular rate and rhythm with no murmurs, rubs, clicks or gallops appreciated.  Pulmonary/Chest:  Clear to auscultation bilaterally with no crackles, wheezes or rhonchi appreciated.  Abdominal:  Soft. Nontender. Nondistended  Bowel sounds are normal in all four quadrants. No organomegally appreciated.   Musculoskeletal:   1+ pitting edema bilateral lower extremities, right lower extremity wrapped in Ace bandage.  Neurological:  Alert and oriented to person, place, and time. Cranial nerves II-XII intact with no focal deficits.  No facial droop.  No slurred speech.   Skin:  Warm and dry to palpation with no rashes or lesions appreciated.  Peripheral vascular:  2+ radial and pedal pulses in bilateral upper and lower extremities.  Psychiatric:  Alert and oriented x3, demonstrates appropriate judgement and insight.  ----------------------------------------------------------------------------------------------------------------------  Tele:    ----------------------------------------------------------------------------------------------------------------------  Results from last 7 days   Lab Units 05/31/21  0820 05/31/21 0212   TROPONIN T ng/mL <0.010 <0.010     Results from last 7 days   Lab Units 06/02/21  0222 05/31/21  0212 05/30/21  2230 05/30/21  2137   CRP mg/dL  --  1.45*   --  1.30*   LACTATE mmol/L  --   --  1.9  --    WBC 10*3/mm3 11.92* 13.51*  --  19.10*   HEMOGLOBIN g/dL 13.1 15.2  --  17.0*   HEMATOCRIT % 41.3 46.5  --  52.5*   MCV fL 94.5 91.9  --  91.5   MCHC g/dL 31.7 32.7  --  32.4   PLATELETS 10*3/mm3 255 284  --  358     Results from last 7 days   Lab Units 05/30/21  2303   PH, ARTERIAL pH units 7.487*   PO2 ART mm Hg 69.2*   PCO2, ARTERIAL mm Hg 40.6   HCO3 ART mmol/L 30.6*     Results from last 7 days   Lab Units 06/02/21 0222 05/31/21 0212 05/30/21  2137   SODIUM mmol/L 141 135* 136   POTASSIUM mmol/L 3.9 3.4* 3.8   CHLORIDE mmol/L 106 95* 93*   CO2 mmol/L 27.1 26.3 26.0   BUN mg/dL 15 13 13   CREATININE mg/dL 0.73 0.96 1.07*   EGFR IF NONAFRICN AM mL/min/1.73 78 57* 50*   CALCIUM mg/dL 8.8 9.2 10.1   GLUCOSE mg/dL 232* 324* 309*   ALBUMIN g/dL  --  3.78 4.17   BILIRUBIN mg/dL  --  0.9 1.0   ALK PHOS U/L  --  118* 133*   AST (SGOT) U/L  --  14 17   ALT (SGPT) U/L  --  13 17   Estimated Creatinine Clearance: 77.7 mL/min (by C-G formula based on SCr of 0.73 mg/dL).    No results found for: AMMONIA      No results found for: BLOODCX  Urine Culture   Date Value Ref Range Status   05/30/2021 >100,000 CFU/mL Gram Negative Bacilli (A)  Preliminary     No results found for: WOUNDCX  No results found for: STOOLCX    I have personally looked at the labs and they are summarized above.  ----------------------------------------------------------------------------------------------------------------------  Imaging Results (Last 24 Hours)     ** No results found for the last 24 hours. **        ----------------------------------------------------------------------------------------------------------------------  Assessment and Plan:    1.  Sepsis -present on admission, now resolved. Continue to treat underlying etiology which is felt to be secondary to acute cystitis    2.  Acute cystitis -urine culture with greater than 100,000 CFU of pansensitive E. coli, will complete Rocephin  today    3.  Frequent falls -likely secondary to significant orthostatic hypotension.  Orthostasis has dramatically improved with addition of midodrine 5 mg p.o. 3 times daily.  We will continue to work with physical therapy and Occupational Therapy.    4.  Insulin-dependent type 2 diabetes mellitus -continue Accu-Cheks before every meal and nightly with sliding scale insulin    5.  General debility -continue PT/OT, has been approved for placement at the [facility] on discharge.    6.  Paroxysmal A. Fib -we will continue to hold anticoagulation in the setting of frequent falls.  We will continue sotalol per cardiology recommendations.  Will monitor on telemetry for another 24 hours and if QTC remains stable, will likely continue sotalol on discharge.      Disposition likely discharge tomorrow.        Rocky Méndez DO  06/03/21  15:28 EDT    Electronically signed by Rocky Méndez DO at 06/03/21 1529       Consult Notes (last 24 hours) (Notes from 06/03/21 1428 through 06/04/21 1428)    No notes of this type exist for this encounter.         Physical Therapy Notes (last 24 hours) (Notes from 06/03/21 1428 through 06/04/21 1428)    No notes exist for this encounter.         Occupational Therapy Notes (last 24 hours) (Notes from 06/03/21 1428 through 06/04/21 1428)    No notes exist for this encounter.         Summary  Andreea Marshall  MRN: [id number]    Icon primary diagnosis          Urinary tract infection   Icon Date   5/30/2021 - 6/4/2021   Icon Location   [address]   Icon Checklist header    Your Next Steps    Icon do   Do   Icon Checkbox     [facility] [place] [address] [facility] [phone]  - [phone] FX  1 midodrine  2 sotalol  Icon read   Read   Icon Checkbox    Read these attachments  1 Cervical Strain and Sprain Rehab-SportsMed (English)  2 Contusion  Easy-to-Read (English)  [facility] Sign-Up    Send messages to your  "doctor, view your test results, renew your prescriptions, schedule appointments, and more.   Go to [email or web address], click \"Sign Up Now\", and enter your personal activation code: [email or web address]. Activation code expires 6/29/2021.  After Visit Summary  Instructions     Icon medication changes this visit           Your medications have changed    Icon medications to start taking   START taking:   midodrine (PROAMATINE)    sotalol (BETAPACE)   Review your updated medication list below.  Your Next Steps  Icon do   Do  Icon read   Read  If you have any questions about your recovery, please call the [facility] Nurse Call Center at [phone]. A registered nurse is available 24 hours a day 7 days a week to assist you.   If you have any COVID-19 related questions, please call [phone].   Icon Orders placed today                  Other instructions    Discharge Follow-up with Specialty: Cardiology; 2 Weeks   What's Next    What's Next          Follow up with No Known Provider [place] [address]  [phone]    Icon Orders placed today                  Additional Information    No  Apt  Made  Pt  Discharge  To [facility]  And  rehab   Your Allergies  Date Reviewed: 5/31/2021  Your Allergies   No active allergies   Patient Belongings Returned    Document Return of Belongings Flowsheet    Were the patient bedside belongings sent home? Yes   Medications Retrieved from Pharmacy & Sent Home N/A   Belongings Sent to Safe None   Belongings sent with: --   Belongings Retrieved from Security & Sent Home N/A       POPSUGAR Signup    [facility] POPSUGAR allows you to send messages to your doctor, view your test results, renew your prescriptions, schedule appointments, and more. To sign up, go to [employer] and click on the Sign Up Now link in the New User? box. Enter your POPSUGAR Activation Code exactly as it appears below along with the last " four digits of your Social Security Number and your Date of Birth () to complete the sign-up process. If you do not sign up before the expiration date, you must request a new code.     [employer] Activation Code: [email or web address]  Expires: 2021  7:55 PM     If you have questions, you can email [email or web address] or call [phone] to talk to our [employer] staff. Remember, SOLARBRUSH is NOT to be used for urgent needs. For medical emergencies, dial 911.     Medication List  Medication List     Morning Afternoon Evening Bedtime As Needed    albuterol sulfate  (90 Base) MCG/ACT inhaler  Commonly known as: PROVENTIL HFA;VENTOLIN HFA;PROAIR HFA  Inhale 2 puffs Every 6 (Six) Hours As Needed for Wheezing.         amLODIPine 5 MG tablet  Commonly known as: NORVASC  Take 5 mg by mouth Daily.  Last time this was given: 5 mg on 2021  9:20 AM         benzonatate 100 MG capsule  Commonly known as: TESSALON  Take 100 mg by mouth 3 (Three) Times a Day As Needed for Cough.  Last time this was given: 100 mg on 2021 10:02 PM         budesonide 180 MCG/ACT inhaler  Commonly known as: PULMICORT  Inhale 1 puff 2 (Two) Times a Day.         donepezil 5 MG tablet  Commonly known as: ARICEPT  Take 5 mg by mouth Daily.  Last time this was given: 5 mg on 2021  9:19 AM         DULoxetine 30 MG capsule  Commonly known as: CYMBALTA  Take 30 mg by mouth Daily.  Last time this was given: 30 mg on 2021  9:19 AM         esomeprazole 40 MG capsule  Commonly known as: nexIUM  Take 40 mg by mouth Every Morning Before Breakfast.         ezetimibe 10 MG tablet 10 mg, simvastatin 40 MG tablet 40 mg  Take 1 tablet by mouth Every Night.         furosemide 20 MG tablet  Commonly known as: LASIX  Take 20 mg by mouth Daily.  Last time this was given: 20 mg on 2021  9:20 AM         insulin glargine 100 UNIT/ML injection  Commonly known as: LANTUS, SEMGLEE  Inject 80 Units under the skin into the  appropriate area as directed Daily.        Icon medications to start taking   midodrine 5 MG tablet  Commonly known as: PROAMATINE  Take 1 tablet by mouth 3 (Three) Times a Day Before Meals.  Last time this was given: 5 mg on June 4, 2021  9:18 AM         montelukast 10 MG tablet  Commonly known as: SINGULAIR  Take 10 mg by mouth Daily.  Last time this was given: 10 mg on June 4, 2021  9:19 AM         potassium chloride 10 MEQ CR tablet  Take 10 mEq by mouth Daily.  Last time this was given: 10 mEq on June 4, 2021  9:19 AM        Icon medications to start taking   sotalol 80 MG tablet  Commonly known as: BETAPACE  Take 1 tablet by mouth Every 12 (Twelve) Hours.  Last time this was given: 80 mg on June 4, 2021  9:19 AM         tiZANidine 2 MG half tablet  Commonly known as: ZANAFLEX  Take 2 mg by mouth 2 (two) times a day.  Last time this was given: 2 mg on June 1, 2021  2:13 AM         triamterene-hydrochlorothiazide 37.5-25 MG per tablet  Commonly known as: MAXZIDE-25  Take 1 tablet by mouth Daily.        Where to  your medications     Icon medication  information                   these medications at [facility] - [place], [place] - [address] [place] [address] [phone] [facility] [phone] FX     midodrine • sotalol  Address: [address] [place] [address]   Phone: [phone]   Always carry an updated list of your medications with you. If there is an emergency, a responder can quickly see what medications you are taking. Take this paperwork with you the next time you see your health care provider.        Sen Sign-Up  Instructions    Follow up  with you primary care provider in 2-3 days.     Return to the emergency room for worsening symptoms.     Call one of the offices below to establish a primary care provider.  If you are unable to get an appointment and feel it is an emergency and need to be seen immediately please return to the Emergency Department.     Call one of the  office below to set up a primary care provider.    Dr. August Orellana                                                                                                       [address] [facility] [address]  [phone]     Dr. Ellington, Dr. NATHALIE Gross, Dr. TERRIE Gross ([facility])  [address] [address]  [phone]     Dr. Bartholomew, Dr. Hernandez, Dr. Hammonds ([facility])  [address] [facility] [address]  [phone]     Dr. Fletcher  [address] [facility] [address]  [phone]     Dr. Johnson, Dr. Ortiz, Dr. Horne, Dr. Sr ([facility])  [address]  [facility] [address]  [phone]     Dr. Nena Connolly  [address] [facility] [address]  [phone]     Dr. Eli Orellana  [address]   [address]  [facility] [address]  [phone]     Dr. Orellana  [address] [facility] [address]  [phone]     Dr. Carmichael, Dr. Worthington  [address] [facility] [address]  [phone]     Dr. Chavarria  [address]                                                              [address]  [facility] [address]  [phone]     Dr. Ambrosio  [address] [facility] [address]  [phone]     Dr. Mahi Turk  [address] [facility]  [address]  [place] [address]  [phone]     Dr. Hall and [facility]   [address] [facility]  [address]  [place] [address]  [phone]                                                                                                                                                                 Additional Instructions    Click to add instructions  Icon List of Attachments     Attached Information  Cervical Strain and Sprain Rehab-SportsMed (English)  Cervical Strain and Sprain Rehab  Ask your health care provider which exercises are safe for you. Do exercises exactly as told by your health care provider and adjust them as directed. It is normal to feel mild stretching, pulling, tightness, or discomfort as  you do these exercises. Stop right away if you feel sudden pain or your pain gets worse. Do not begin these exercises until told by your health care provider.  Stretching and range-of-motion exercises  Cervical side bending  ?     1. Using good posture, sit on a stable chair or stand up.  2. Without moving your shoulders, slowly tilt your left / right ear to your shoulder until you feel a stretch in the opposite side neck muscles. You should be looking straight ahead.  3. Hold for __________ seconds.  4. Repeat with the other side of your neck.  Repeat __________ times. Complete this exercise __________ times a day.  Cervical rotation  ?     1. Using good posture, sit on a stable chair or stand up.  2. Slowly turn your head to the side as if you are looking over your left / right shoulder.  ? Keep your eyes level with the ground.  ? Stop when you feel a stretch along the side and the back of your neck.  3. Hold for __________ seconds.  4. Repeat this by turning to your other side.  Repeat __________ times. Complete this exercise __________ times a day.  Thoracic extension and pectoral stretch  1. Roll a towel or a small blanket so it is about 4 inches (10 cm) in diameter.  2. Lie down on your back on a firm surface.  3. Put the towel lengthwise, under your spine in the middle of your back. It should not be under your shoulder blades. The towel should line up with your spine from your middle back to your lower back.  4. Put your hands behind your head and let your elbows fall out to your sides.  5. Hold for __________ seconds.  Repeat __________ times. Complete this exercise __________ times a day.  Strengthening exercises  Isometric upper cervical flexion  1. Lie on your back with a thin pillow behind your head and a small rolled-up towel under your neck.  2. Gently tuck your chin toward your chest and nod your head down to look toward your feet. Do not lift your head off the pillow.  3. Hold for __________  seconds.  4. Release the tension slowly. Relax your neck muscles completely before you repeat this exercise.  Repeat __________ times. Complete this exercise __________ times a day.  Isometric cervical extension  ?     1. Stand about 6 inches (15 cm) away from a wall, with your back facing the wall.  2. Place a soft object, about 6-8 inches (15-20 cm) in diameter, between the back of your head and the wall. A soft object could be a small pillow, a ball, or a folded towel.  3. Gently tilt your head back and press into the soft object. Keep your jaw and forehead relaxed.  4. Hold for __________ seconds.  5. Release the tension slowly. Relax your neck muscles completely before you repeat this exercise.  Repeat __________ times. Complete this exercise __________ times a day.  Posture and body mechanics  Body mechanics refers to the movements and positions of your body while you do your daily activities. Posture is part of body mechanics. Good posture and healthy body mechanics can help to relieve stress in your body's tissues and joints. Good posture means that your spine is in its natural S-curve position (your spine is neutral), your shoulders are pulled back slightly, and your head is not tipped forward. The following are general guidelines for applying improved posture and body mechanics to your everyday activities.  Sitting  ?     1. When sitting, keep your spine neutral and keep your feet flat on the floor. Use a footrest, if necessary, and keep your thighs parallel to the floor. Avoid rounding your shoulders, and avoid tilting your head forward.  2. When working at a desk or a computer, keep your desk at a height where your hands are slightly lower than your elbows. Slide your chair under your desk so you are close enough to maintain good posture.  3. When working at a computer, place your monitor at a height where you are looking straight ahead and you do not have to tilt your head forward or downward to look at  the screen.  Standing  ?     · When standing, keep your spine neutral and keep your feet about hip-width apart. Keep a slight bend in your knees. Your ears, shoulders, and hips should line up.  · When you do a task in which you  one place for a long time, place one foot up on a stable object that is 2-4 inches (5-10 cm) high, such as a footstool. This helps keep your spine neutral.  Resting  When lying down and resting, avoid positions that are most painful for you. Try to support your neck in a neutral position. You can use a contour pillow or a small rolled-up towel. Your pillow should support your neck but not push on it.  This information is not intended to replace advice given to you by your health care provider. Make sure you discuss any questions you have with your health care provider.  Document Revised: 04/08/2020 Document Reviewed: 09/18/2019  [employer] Patient Education © 2021 [employer]     Icon List of Attachments     Attached Information  Contusion  Easy-to-Read (English)  Contusion  A contusion is a deep bruise. This is a result of an injury that causes bleeding under the skin. Symptoms of bruising include pain, swelling, and discolored skin. The skin may turn blue, purple, or yellow.  Follow these instructions at home:  Managing pain, stiffness, and swelling  ?  You may use RICE. This stands for:  · Resting.  · Icing.  · Compression, or putting pressure.  · Elevating, or raising the injured area.  To follow this method, do these actions:  · Rest the injured area.  · If told, put ice on the injured area.  ? Put ice in a plastic bag.  ? Place a towel between your skin and the bag.  ? Leave the ice on for 20 minutes, 2-3 times per day.  · If told, put light pressure (compression) on the injured area using an elastic bandage. Make sure the bandage is not too tight. If the area tingles or becomes numb, remove it and put it back on as told by your doctor.  · If possible, raise (elevate) the  injured area above the level of your heart while you are sitting or lying down.     General instructions  · Take over-the-counter and prescription medicines only as told by your doctor.  · Keep all follow-up visits as told by your doctor. This is important.  Contact a doctor if:  · Your symptoms do not get better after several days of treatment.  · Your symptoms get worse.  · You have trouble moving the injured area.  Get help right away if:  · You have very bad pain.  · You have a loss of feeling (numbness) in a hand or foot.  · Your hand or foot turns pale or cold.  Summary  · A contusion is a deep bruise. This is a result of an injury that causes bleeding under the skin.  · Symptoms of bruising include pain, swelling, and discolored skin. The skin may turn blue, purple, or yellow.  · This condition is treated with rest, ice, compression, and elevation. This is also called RICE. You may be given over-the-counter medicines for pain.  · Contact a doctor if you do not feel better, or you feel worse. Get help right away if you have very bad pain, have lost feeling in a hand or foot, or the area turns pale or cold.  This information is not intended to replace advice given to you by your health care provider. Make sure you discuss any questions you have with your health care provider.  Document Revised: 08/09/2019 Document Reviewed: 08/09/2019  [employer] Patient Education © 2021 [employer]           Opioid Resource    If you or someone you know needs information on substance abuse, please visit   https://www.findhelpnowky.org/ for listings of facilities and resources across [place].  Stroke Symptoms    · Call 911 or have someone take you to the Emergency Department if you have any of the following:  · Sudden numbness or weakness of your face, arm or leg especially on one side of the body  · Sudden confusion, difficulty speaking or trouble understanding   · Changes in your vision or loss of sight in one eye  · Sudden  severe headache with no known cause  · Sudden dizziness, trouble walking, loss of balance or coordination     It is important to seek emergency care right away if you have further stroke symptoms. If you get emergency help quickly, the powerful clot-dissolving medicines can reduce the disabilities caused by a stroke.      For more information:  American Stroke Association  9-263-0-STROKE  www.strokeassociation.org  Smoking Cessation    IF YOU SMOKE OR USE TOBACCO PLEASE READ THE FOLLOWING:  Why is smoking bad for me?  Smoking increases the risk of heart disease, lung disease, vascular disease, stroke, and cancer. If you smoke, STOP!     For more information:  Quit Now [place]  1-800-QUIT-NOW  https://[place].quitlogix.org/en-[place]/  Suicidal Feelings    If you feel like life is too tough and are thinking of suicide or injuring yourself, get help right away!  · Call 911  · Call a suicide hotline to speak to a counselor. 0-224-621-TALK or 8-195-QHPXXXV   Patient Experience    Thank you for choosing [facility]. You may receive a survey following your visit. Please take a moment to share what went well, where we need improvement, and which staff members deserve recognition. We value your input.           ? ?              YOU ARE THE MOST IMPORTANT FACTOR IN YOUR RECOVERY.      Follow all instructions carefully.      I have reviewed my discharge instructions with my nurse, including the following information, if applicable:                 Information about my illness and diagnosis              Follow up appointments (including lab draws)              Wound Care              Equipment Needs              Medications (new and continuing) along with side effects              Preventative information such as vaccines and smoking cessations              Diet              Pain              I know when to contact my Doctor's office or seek emergency care        I want my nurse to describe the side effects of my medications:  YES NO   If the answer is no, I understand the side effects of my medications: YES NO   My nurse described the side effects of my medications in a way that I could understand: YES NO   I have taken my personal belongings and my own medications with me at discharge: YES NO       I have received this information and my questions have been answered. I have discussed any concerns I see with this plan with the nurse or physician. I understand these instructions.     Signature of Patient or Responsible Person: _____________________________________     Date: _________________  Time: __________________     Signature of Healthcare Provider: _______________________________________  Date: _________________  Time: __________________           Discharge Summary      Rocky Méndez, DO at 21 1307              [facility] HOSPITALIST MEDICINE DISCHARGE SUMMARY    Patient Identification:  Name:  Andreea Marshall  Age:  71 y.o.  Sex:  female  :  1950  MRN:  [id number]  Visit Number:  [id number]    Date of Admission: 2021  Date of Discharge: 2021    PCP: Provider, No Known    DISCHARGE DIAGNOSIS   1.  Orthostatic hypotension  2.  Sepsis (present on admission, resolved)  3.  Acute cystitis  4.  Frequent falls  5.  Insulin-dependent type 2 diabetes mellitus  6.  General debility  7.  Paroxysmal A. fib      CONSULTS  1. Dr. Cates, Cardiology        PROCEDURES PERFORMED   None      HOSPITAL COURSE  Ms. Marshall is a 71 y.o. female who presented to [facility] ED on 2021 with a chief complaint of frequent falls.  Patient has a past medical history remarkable for dementia, essential hypertension, paroxysmal A. fib, COPD, osteoporosis, GERD and insulin-dependent type 2 diabetes mellitus.  Patient arrived at the emergency department with her daughter at bedside.  Her daughter was able to assist in providing most of her history.  According to the patient and her daughter, the patient's daughter  has been living with her for approximately 1.5 years and has required total care with all activities of daily living.  Patient's daughter states that the patient has a history of falls over the past few years but has been falling more frequently at home in the past 2 to 3 months.  She does report the patient has fallen a total of 6 times in the past 2 months.  Patient did sustain a mechanical fall on date of presentation when she was ambulating to the bathroom and experienced dizziness and fell to the ground.  The patient denied having syncopal episode or losing consciousness but did report some shortness of breath.  She states shortness of breath was no worse than her usual secondary to COPD.  For this reason, patient presented to the emergency department for further treatment and evaluation.  Initial evaluation in the emergency department did consist of basic laboratory work as well as physical exam and vital signs.  Initial vital signs found patient's blood pressure 148/82, respirations 14, heart rate 85 and temperature 98.1 °F.  ABG demonstrated pH of 7.48, PCO2 40.6, PO2 69 and bicarb 30.6.  CMP was essentially within normal limits.  CBC demonstrated elevated white blood cell count of 19 with a hemoglobin of 17 and hematocrit 52.5.  Urinalysis demonstrated positive nitrites with 1+ leukocyte esterase and too numerous to count WBC per high-power field with 4+ bacteria.  Blood cultures and urine cultures were both obtained in the emergency department.  Overall, patient was diagnosed with sepsis secondary to acute cystitis and recurrent frequent falls.    In regards to sepsis, urine culture was obtained which demonstrated pansensitive E. coli.  Patient had been empirically started on Rocephin 1 g IV daily and did complete a 5-day course of antibiotic therapy prior to discharge.  In regards to frequent falls, patient was found to be markedly orthostatic and did have variations in blood pressure ranging from 150  systolic lying flat down to lower 90s systolic standing up.  It is felt this is likely the main etiology of patient's recurrent falls at home.  Cardiology services were consulted who did thoroughly evaluate the patient.  Recommendation was made to initiate midodrine 5 mg p.o. 3 times daily.  This was started and within 24 hours patient's orthostasis did resolve.  After thorough evaluation by cardiology services, recommendation was made to start patient on sotalol for paroxysmal A. fib as well as consideration of anticoagulation.  However, secondary to high fall risk, anticoagulation has been deferred at this time.  However, if patient continues to improve and does demonstrate the ability to ambulate safely without recurrent falls, would strongly encourage patient to consider anticoagulation at that point.  With this in mind, it is felt patient has reached maximum medical benefit of current hospitalization and she will be discharged to a skilled nursing facility in stable condition today.  The beforementioned plan was thoroughly discussed with the patient and she expressed her understanding and willingness to proceed with the beforementioned plan.    VITAL SIGNS:      06/02/21  0125 06/03/21  0416 06/04/21  0301   Weight: 116 kg (256 lb) 118 kg (260 lb 9.6 oz) 118 kg (260 lb 12.8 oz)     Body mass index is 46.2 kg/m².    PHYSICAL EXAM:  Constitutional: Morbidly obese  female in no apparent distress.     HENT:  Head:  Normocephalic and atraumatic.  Mouth:  Moist mucous membranes.    Eyes:  Conjunctivae and EOM are normal.  Pupils are equal, round, and reactive to light.  No scleral icterus.    Neck:  Neck supple. No thyromegaly.  No JVD present.    Cardiovascular:  Regular rate and rhythm with no murmurs, rubs, clicks or gallops appreciated.  Pulmonary/Chest:  Clear to auscultation bilaterally with no crackles, wheezes or rhonchi appreciated.  Abdominal:  Soft. Nontender. Nondistended  Bowel sounds are normal  in all four quadrants. No organomegally appreciated.   Musculoskeletal:   1+ pitting edema bilateral lower extremities, right lower extremity wrapped in Ace bandage.  Neurological:  Alert and oriented to person, place, and time. Cranial nerves II-XII intact with no focal deficits.  No facial droop.  No slurred speech.   Skin:  Warm and dry to palpation with no rashes or lesions appreciated.  Peripheral vascular:  2+ radial and pedal pulses in bilateral upper and lower extremities.  Psychiatric:  Alert and oriented x3, demonstrates appropriate judgement and insight.    DISCHARGE DISPOSITION   Stable    DISCHARGE MEDICATIONS:     Discharge Medications      New Medications      Instructions Start Date   HYDROcodone-acetaminophen 5-325 MG per tablet  Commonly known as: NORCO   1 tablet, Oral, Every 6 Hours PRN      midodrine 5 MG tablet  Commonly known as: PROAMATINE   5 mg, Oral, 3 Times Daily Before Meals      sotalol 80 MG tablet  Commonly known as: BETAPACE   80 mg, Oral, Every 12 Hours Scheduled         Continue These Medications      Instructions Start Date   albuterol sulfate  (90 Base) MCG/ACT inhaler  Commonly known as: PROVENTIL HFA;VENTOLIN HFA;PROAIR HFA   2 puffs, Inhalation, Every 6 Hours PRN      amLODIPine 5 MG tablet  Commonly known as: NORVASC   5 mg, Oral, Daily      benzonatate 100 MG capsule  Commonly known as: TESSALON   100 mg, Oral, 3 Times Daily PRN      budesonide 180 MCG/ACT inhaler  Commonly known as: PULMICORT   1 puff, Inhalation, 2 Times Daily - RT      donepezil 5 MG tablet  Commonly known as: ARICEPT   5 mg, Oral, Daily      DULoxetine 30 MG capsule  Commonly known as: CYMBALTA   30 mg, Oral, Daily      esomeprazole 40 MG capsule  Commonly known as: nexIUM   40 mg, Oral, Every Morning Before Breakfast      ezetimibe 10 MG tablet 10 mg, simvastatin 40 MG tablet 40 mg   1 tablet, Oral, Nightly      furosemide 20 MG tablet  Commonly known as: LASIX   20 mg, Oral, Daily      insulin  glargine 100 UNIT/ML injection  Commonly known as: LANTUS, SEMGLEE   80 Units, Subcutaneous, Daily      montelukast 10 MG tablet  Commonly known as: SINGULAIR   10 mg, Oral, Daily      potassium chloride 10 MEQ CR tablet   10 mEq, Oral, Daily      tiZANidine 2 MG half tablet  Commonly known as: ZANAFLEX   2 mg, Oral, 2 times daily      triamterene-hydrochlorothiazide 37.5-25 MG per tablet  Commonly known as: MAXZIDE-25   1 tablet, Oral, Daily                   Additional Instructions for the Follow-ups that You Need to Schedule     Discharge Follow-up with Specialty: Cardiology; 2 Weeks   As directed      Specialty: Cardiology    Follow Up: 2 Weeks    Follow Up Details: orthostatic hypotension and paroxysmal afib           Follow-up Information     Provider, No Known .    Contact information:  [place] [id number] [phone]                   TEST  RESULTS PENDING AT DISCHARGE  Pending Labs     Order Current Status    Blood Culture - Blood, Arm, Left Preliminary result    Blood Culture - Blood, Arm, Right Preliminary result           Rocky Verdin DO  06/04/21  13:07 EDT    Please note that this discharge summary required more than 30 minutes to complete.    Please send a copy of this dictation to the following providers:  Provider, No Known    Electronically signed by Rocky Verdin DO at 06/04/21 1352       Discharge Order (From admission, onward)     Start     Ordered    06/04/21 1304  Discharge patient  Once     Expected Discharge Date: 06/04/21    Expected Discharge Time: Afternoon    Discharge Disposition: Skilled Nursing Facility (DC - External)    Physician of Record for Attribution - Please select from Treatment Team: ROCKY VERDIN [[id number]]    Review needed by CMO to determine Physician of Record: No       Question Answer Comment   Physician of Record for Attribution - Please select from Treatment Team ROCKY VERDIN    Review needed by CMO to  determine Physician of Record No        06/04/21 3329

## 2021-06-04 NOTE — PLAN OF CARE
Pt resting on and off throughout the night. No complaints at this time. Will continue to follow plan of care and monitor.

## 2021-06-04 NOTE — CASE MANAGEMENT/SOCIAL WORK
Discharge Planning Assessment   Sky     Patient Name: Andreea Marshall  MRN: [id number]  Today's Date: 6/4/2021    Admit Date: 5/30/2021        Discharge Plan     Row Name 06/04/21 1518       Plan    Plan  Per nursing EMS would be safer for pt to transfer to nursing home due to fall risk.  Dtr aware and agreeable.    Final Discharge Disposition Code  03 - skilled nursing facility (SNF)    Final Note  Pt to be discharged to [facility] on this date.  Facility aware and agreeable per Mary.  Pt dtr/MINERVA Cardona aware and agreeable.  Pt to be transported via [facility] at [phone].            Livia Wilson, SURIW

## 2021-06-04 NOTE — DISCHARGE SUMMARY
[facility] HOSPITALIST MEDICINE DISCHARGE SUMMARY    Patient Identification:  Name:  Andreea Marshall  Age:  71 y.o.  Sex:  female  :  1950  MRN:  [id number]  Visit Number:  [id number]    Date of Admission: 2021  Date of Discharge: 2021    PCP: Provider, No Known    DISCHARGE DIAGNOSIS   1.  Orthostatic hypotension  2.  Sepsis (present on admission, resolved)  3.  Acute cystitis  4.  Frequent falls  5.  Insulin-dependent type 2 diabetes mellitus  6.  General debility  7.  Paroxysmal A. fib      CONSULTS  1. Dr. Cates, Cardiology        PROCEDURES PERFORMED   None      HOSPITAL COURSE  Ms. Marshall is a 71 y.o. female who presented to [facility] ED on 2021 with a chief complaint of frequent falls.  Patient has a past medical history remarkable for dementia, essential hypertension, paroxysmal A. fib, COPD, osteoporosis, GERD and insulin-dependent type 2 diabetes mellitus.  Patient arrived at the emergency department with her daughter at bedside.  Her daughter was able to assist in providing most of her history.  According to the patient and her daughter, the patient's daughter has been living with her for approximately 1.5 years and has required total care with all activities of daily living.  Patient's daughter states that the patient has a history of falls over the past few years but has been falling more frequently at home in the past 2 to 3 months.  She does report the patient has fallen a total of 6 times in the past 2 months.  Patient did sustain a mechanical fall on date of presentation when she was ambulating to the bathroom and experienced dizziness and fell to the ground.  The patient denied having syncopal episode or losing consciousness but did report some shortness of breath.  She states shortness of breath was no worse than her usual secondary to COPD.  For this reason, patient presented to the emergency department for further treatment and evaluation.  Initial  evaluation in the emergency department did consist of basic laboratory work as well as physical exam and vital signs.  Initial vital signs found patient's blood pressure 148/82, respirations 14, heart rate 85 and temperature 98.1 °F.  ABG demonstrated pH of 7.48, PCO2 40.6, PO2 69 and bicarb 30.6.  CMP was essentially within normal limits.  CBC demonstrated elevated white blood cell count of 19 with a hemoglobin of 17 and hematocrit 52.5.  Urinalysis demonstrated positive nitrites with 1+ leukocyte esterase and too numerous to count WBC per high-power field with 4+ bacteria.  Blood cultures and urine cultures were both obtained in the emergency department.  Overall, patient was diagnosed with sepsis secondary to acute cystitis and recurrent frequent falls.    In regards to sepsis, urine culture was obtained which demonstrated pansensitive E. coli.  Patient had been empirically started on Rocephin 1 g IV daily and did complete a 5-day course of antibiotic therapy prior to discharge.  In regards to frequent falls, patient was found to be markedly orthostatic and did have variations in blood pressure ranging from 150 systolic lying flat down to lower 90s systolic standing up.  It is felt this is likely the main etiology of patient's recurrent falls at home.  Cardiology services were consulted who did thoroughly evaluate the patient.  Recommendation was made to initiate midodrine 5 mg p.o. 3 times daily.  This was started and within 24 hours patient's orthostasis did resolve.  After thorough evaluation by cardiology services, recommendation was made to start patient on sotalol for paroxysmal A. fib as well as consideration of anticoagulation.  However, secondary to high fall risk, anticoagulation has been deferred at this time.  However, if patient continues to improve and does demonstrate the ability to ambulate safely without recurrent falls, would strongly encourage patient to consider anticoagulation at that point.   With this in mind, it is felt patient has reached maximum medical benefit of current hospitalization and she will be discharged to a skilled nursing facility in stable condition today.  The beforementioned plan was thoroughly discussed with the patient and she expressed her understanding and willingness to proceed with the beforementioned plan.    VITAL SIGNS:      06/02/21  0125 06/03/21  0416 06/04/21  0301   Weight: 116 kg (256 lb) 118 kg (260 lb 9.6 oz) 118 kg (260 lb 12.8 oz)     Body mass index is 46.2 kg/m².    PHYSICAL EXAM:  Constitutional: Morbidly obese  female in no apparent distress.     HENT:  Head:  Normocephalic and atraumatic.  Mouth:  Moist mucous membranes.    Eyes:  Conjunctivae and EOM are normal.  Pupils are equal, round, and reactive to light.  No scleral icterus.    Neck:  Neck supple. No thyromegaly.  No JVD present.    Cardiovascular:  Regular rate and rhythm with no murmurs, rubs, clicks or gallops appreciated.  Pulmonary/Chest:  Clear to auscultation bilaterally with no crackles, wheezes or rhonchi appreciated.  Abdominal:  Soft. Nontender. Nondistended  Bowel sounds are normal in all four quadrants. No organomegally appreciated.   Musculoskeletal:   1+ pitting edema bilateral lower extremities, right lower extremity wrapped in Ace bandage.  Neurological:  Alert and oriented to person, place, and time. Cranial nerves II-XII intact with no focal deficits.  No facial droop.  No slurred speech.   Skin:  Warm and dry to palpation with no rashes or lesions appreciated.  Peripheral vascular:  2+ radial and pedal pulses in bilateral upper and lower extremities.  Psychiatric:  Alert and oriented x3, demonstrates appropriate judgement and insight.    DISCHARGE DISPOSITION   Stable    DISCHARGE MEDICATIONS:     Discharge Medications      New Medications      Instructions Start Date   HYDROcodone-acetaminophen 5-325 MG per tablet  Commonly known as: NORCO   1 tablet, Oral, Every 6 Hours  PRN      midodrine 5 MG tablet  Commonly known as: PROAMATINE   5 mg, Oral, 3 Times Daily Before Meals      sotalol 80 MG tablet  Commonly known as: BETAPACE   80 mg, Oral, Every 12 Hours Scheduled         Continue These Medications      Instructions Start Date   albuterol sulfate  (90 Base) MCG/ACT inhaler  Commonly known as: PROVENTIL HFA;VENTOLIN HFA;PROAIR HFA   2 puffs, Inhalation, Every 6 Hours PRN      amLODIPine 5 MG tablet  Commonly known as: NORVASC   5 mg, Oral, Daily      benzonatate 100 MG capsule  Commonly known as: TESSALON   100 mg, Oral, 3 Times Daily PRN      budesonide 180 MCG/ACT inhaler  Commonly known as: PULMICORT   1 puff, Inhalation, 2 Times Daily - RT      donepezil 5 MG tablet  Commonly known as: ARICEPT   5 mg, Oral, Daily      DULoxetine 30 MG capsule  Commonly known as: CYMBALTA   30 mg, Oral, Daily      esomeprazole 40 MG capsule  Commonly known as: nexIUM   40 mg, Oral, Every Morning Before Breakfast      ezetimibe 10 MG tablet 10 mg, simvastatin 40 MG tablet 40 mg   1 tablet, Oral, Nightly      furosemide 20 MG tablet  Commonly known as: LASIX   20 mg, Oral, Daily      insulin glargine 100 UNIT/ML injection  Commonly known as: LANTUS, SEMGLEE   80 Units, Subcutaneous, Daily      montelukast 10 MG tablet  Commonly known as: SINGULAIR   10 mg, Oral, Daily      potassium chloride 10 MEQ CR tablet   10 mEq, Oral, Daily      tiZANidine 2 MG half tablet  Commonly known as: ZANAFLEX   2 mg, Oral, 2 times daily      triamterene-hydrochlorothiazide 37.5-25 MG per tablet  Commonly known as: MAXZIDE-25   1 tablet, Oral, Daily                   Additional Instructions for the Follow-ups that You Need to Schedule     Discharge Follow-up with Specialty: Cardiology; 2 Weeks   As directed      Specialty: Cardiology    Follow Up: 2 Weeks    Follow Up Details: orthostatic hypotension and paroxysmal afib           Follow-up Information     Provider, No Known .    Contact information:  [address]  [facility] [address]  [phone]                   TEST  RESULTS PENDING AT DISCHARGE  Pending Labs     Order Current Status    Blood Culture - Blood, Arm, Left Preliminary result    Blood Culture - Blood, Arm, Right Preliminary result           Rocky Méndez,   06/04/21  13:07 EDT    Please note that this discharge summary required more than 30 minutes to complete.    Please send a copy of this dictation to the following providers:  Provider, No Known

## 2021-06-05 LAB
QT INTERVAL: 418 MS
QT INTERVAL: 462 MS
QT INTERVAL: 474 MS
QTC INTERVAL: 447 MS
QTC INTERVAL: 465 MS
QTC INTERVAL: 468 MS

## 2021-06-07 LAB
QT INTERVAL: 448 MS
QT INTERVAL: 452 MS
QT INTERVAL: 452 MS
QTC INTERVAL: 462 MS
QTC INTERVAL: 470 MS
QTC INTERVAL: 473 MS

## 2021-06-30 ENCOUNTER — LAB REQUISITION (OUTPATIENT)
Dept: LAB | Facility: HOSPITAL | Age: 71
End: 2021-06-30

## 2021-06-30 DIAGNOSIS — E13.22 OTHER SPECIFIED DIABETES MELLITUS WITH DIABETIC CHRONIC KIDNEY DISEASE (HCC): ICD-10-CM

## 2021-06-30 DIAGNOSIS — E11.69 TYPE 2 DIABETES MELLITUS WITH OTHER SPECIFIED COMPLICATION (HCC): ICD-10-CM

## 2021-06-30 LAB
ALBUMIN SERPL-MCNC: 3.78 G/DL (ref 3.5–5.2)
ALBUMIN/GLOB SERPL: 1.2 G/DL
ALP SERPL-CCNC: 123 U/L (ref 39–117)
ALT SERPL W P-5'-P-CCNC: 16 U/L (ref 1–33)
ANION GAP SERPL CALCULATED.3IONS-SCNC: 7.9 MMOL/L (ref 5–15)
AST SERPL-CCNC: 13 U/L (ref 1–32)
BILIRUB SERPL-MCNC: 0.6 MG/DL (ref 0–1.2)
BUN SERPL-MCNC: 16 MG/DL (ref 8–23)
BUN/CREAT SERPL: 20.5 (ref 7–25)
CALCIUM SPEC-SCNC: 9.4 MG/DL (ref 8.6–10.5)
CHLORIDE SERPL-SCNC: 100 MMOL/L (ref 98–107)
CO2 SERPL-SCNC: 30.1 MMOL/L (ref 22–29)
CREAT SERPL-MCNC: 0.78 MG/DL (ref 0.57–1)
GFR SERPL CREATININE-BSD FRML MDRD: 72 ML/MIN/1.73
GLOBULIN UR ELPH-MCNC: 3.1 GM/DL
GLUCOSE SERPL-MCNC: 216 MG/DL (ref 65–99)
HBA1C MFR BLD: 11.4 % (ref 4.8–5.6)
POTASSIUM SERPL-SCNC: 4 MMOL/L (ref 3.5–5.2)
PROT SERPL-MCNC: 6.9 G/DL (ref 6–8.5)
SODIUM SERPL-SCNC: 138 MMOL/L (ref 136–145)
T3FREE SERPL-MCNC: 2.45 PG/ML (ref 2–4.4)
T4 FREE SERPL-MCNC: 0.94 NG/DL (ref 0.93–1.7)
TSH SERPL DL<=0.05 MIU/L-ACNC: 2.95 UIU/ML (ref 0.27–4.2)

## 2021-06-30 PROCEDURE — 84481 FREE ASSAY (FT-3): CPT | Performed by: NURSE PRACTITIONER

## 2021-06-30 PROCEDURE — 84443 ASSAY THYROID STIM HORMONE: CPT | Performed by: NURSE PRACTITIONER

## 2021-06-30 PROCEDURE — 83036 HEMOGLOBIN GLYCOSYLATED A1C: CPT | Performed by: NURSE PRACTITIONER

## 2021-06-30 PROCEDURE — 80053 COMPREHEN METABOLIC PANEL: CPT | Performed by: NURSE PRACTITIONER

## 2021-06-30 PROCEDURE — 84439 ASSAY OF FREE THYROXINE: CPT | Performed by: NURSE PRACTITIONER

## 2021-07-02 ENCOUNTER — LAB REQUISITION (OUTPATIENT)
Dept: LAB | Facility: HOSPITAL | Age: 71
End: 2021-07-02

## 2021-07-02 DIAGNOSIS — I10 ESSENTIAL (PRIMARY) HYPERTENSION: ICD-10-CM

## 2021-07-02 LAB
BASOPHILS # BLD AUTO: 0.07 10*3/MM3 (ref 0–0.2)
BASOPHILS NFR BLD AUTO: 0.5 % (ref 0–1.5)
DEPRECATED RDW RBC AUTO: 43.5 FL (ref 37–54)
EOSINOPHIL # BLD AUTO: 0.57 10*3/MM3 (ref 0–0.4)
EOSINOPHIL NFR BLD AUTO: 4.2 % (ref 0.3–6.2)
ERYTHROCYTE [DISTWIDTH] IN BLOOD BY AUTOMATED COUNT: 12.5 % (ref 12.3–15.4)
HCT VFR BLD AUTO: 40.5 % (ref 34–46.6)
HGB BLD-MCNC: 12.9 G/DL (ref 12–15.9)
IMM GRANULOCYTES # BLD AUTO: 0.05 10*3/MM3 (ref 0–0.05)
IMM GRANULOCYTES NFR BLD AUTO: 0.4 % (ref 0–0.5)
LYMPHOCYTES # BLD AUTO: 4.77 10*3/MM3 (ref 0.7–3.1)
LYMPHOCYTES NFR BLD AUTO: 35.5 % (ref 19.6–45.3)
MCH RBC QN AUTO: 30.3 PG (ref 26.6–33)
MCHC RBC AUTO-ENTMCNC: 31.9 G/DL (ref 31.5–35.7)
MCV RBC AUTO: 95.1 FL (ref 79–97)
MONOCYTES # BLD AUTO: 0.85 10*3/MM3 (ref 0.1–0.9)
MONOCYTES NFR BLD AUTO: 6.3 % (ref 5–12)
NEUTROPHILS NFR BLD AUTO: 53.1 % (ref 42.7–76)
NEUTROPHILS NFR BLD AUTO: 7.13 10*3/MM3 (ref 1.7–7)
NRBC BLD AUTO-RTO: 0 /100 WBC (ref 0–0.2)
PLATELET # BLD AUTO: 276 10*3/MM3 (ref 140–450)
PMV BLD AUTO: 8.9 FL (ref 6–12)
RBC # BLD AUTO: 4.26 10*6/MM3 (ref 3.77–5.28)
WBC # BLD AUTO: 13.44 10*3/MM3 (ref 3.4–10.8)

## 2021-07-02 PROCEDURE — 85025 COMPLETE CBC W/AUTO DIFF WBC: CPT | Performed by: INTERNAL MEDICINE

## 2021-08-18 ENCOUNTER — LAB REQUISITION (OUTPATIENT)
Dept: LAB | Facility: HOSPITAL | Age: 71
End: 2021-08-18

## 2021-08-18 DIAGNOSIS — N39.0 URINARY TRACT INFECTION, SITE NOT SPECIFIED: ICD-10-CM

## 2021-08-18 DIAGNOSIS — I10 ESSENTIAL (PRIMARY) HYPERTENSION: ICD-10-CM

## 2021-08-18 LAB
AMYLASE SERPL-CCNC: 45 U/L (ref 28–100)
ANION GAP SERPL CALCULATED.3IONS-SCNC: 7.6 MMOL/L (ref 5–15)
BASOPHILS # BLD AUTO: 0.05 10*3/MM3 (ref 0–0.2)
BASOPHILS NFR BLD AUTO: 0.4 % (ref 0–1.5)
BILIRUB UR QL STRIP: NEGATIVE
BUN SERPL-MCNC: 15 MG/DL (ref 8–23)
BUN/CREAT SERPL: 19.2 (ref 7–25)
CALCIUM SPEC-SCNC: 9.5 MG/DL (ref 8.6–10.5)
CHLORIDE SERPL-SCNC: 94 MMOL/L (ref 98–107)
CLARITY UR: CLEAR
CO2 SERPL-SCNC: 30.4 MMOL/L (ref 22–29)
COLOR UR: YELLOW
CREAT SERPL-MCNC: 0.78 MG/DL (ref 0.57–1)
CRP SERPL-MCNC: 2.62 MG/DL (ref 0–0.5)
DEPRECATED RDW RBC AUTO: 40.1 FL (ref 37–54)
EOSINOPHIL # BLD AUTO: 0.41 10*3/MM3 (ref 0–0.4)
EOSINOPHIL NFR BLD AUTO: 3.6 % (ref 0.3–6.2)
ERYTHROCYTE [DISTWIDTH] IN BLOOD BY AUTOMATED COUNT: 11.9 % (ref 12.3–15.4)
GFR SERPL CREATININE-BSD FRML MDRD: 72 ML/MIN/1.73
GLUCOSE SERPL-MCNC: 321 MG/DL (ref 65–99)
GLUCOSE UR STRIP-MCNC: ABNORMAL MG/DL
HCT VFR BLD AUTO: 41.9 % (ref 34–46.6)
HGB BLD-MCNC: 13.4 G/DL (ref 12–15.9)
HGB UR QL STRIP.AUTO: NEGATIVE
IMM GRANULOCYTES # BLD AUTO: 0.03 10*3/MM3 (ref 0–0.05)
IMM GRANULOCYTES NFR BLD AUTO: 0.3 % (ref 0–0.5)
KETONES UR QL STRIP: NEGATIVE
LEUKOCYTE ESTERASE UR QL STRIP.AUTO: NEGATIVE
LIPASE SERPL-CCNC: 97 U/L (ref 13–60)
LYMPHOCYTES # BLD AUTO: 4.13 10*3/MM3 (ref 0.7–3.1)
LYMPHOCYTES NFR BLD AUTO: 35.9 % (ref 19.6–45.3)
MCH RBC QN AUTO: 29.5 PG (ref 26.6–33)
MCHC RBC AUTO-ENTMCNC: 32 G/DL (ref 31.5–35.7)
MCV RBC AUTO: 92.3 FL (ref 79–97)
MONOCYTES # BLD AUTO: 0.78 10*3/MM3 (ref 0.1–0.9)
MONOCYTES NFR BLD AUTO: 6.8 % (ref 5–12)
NEUTROPHILS NFR BLD AUTO: 53 % (ref 42.7–76)
NEUTROPHILS NFR BLD AUTO: 6.1 10*3/MM3 (ref 1.7–7)
NITRITE UR QL STRIP: NEGATIVE
NRBC BLD AUTO-RTO: 0 /100 WBC (ref 0–0.2)
PH UR STRIP.AUTO: 6 [PH] (ref 5–8)
PLATELET # BLD AUTO: 350 10*3/MM3 (ref 140–450)
PMV BLD AUTO: 8.6 FL (ref 6–12)
POTASSIUM SERPL-SCNC: 4 MMOL/L (ref 3.5–5.2)
PROT UR QL STRIP: ABNORMAL
RBC # BLD AUTO: 4.54 10*6/MM3 (ref 3.77–5.28)
SODIUM SERPL-SCNC: 132 MMOL/L (ref 136–145)
SP GR UR STRIP: 1.03 (ref 1–1.03)
UROBILINOGEN UR QL STRIP: ABNORMAL
WBC # BLD AUTO: 11.5 10*3/MM3 (ref 3.4–10.8)

## 2021-08-18 PROCEDURE — 86140 C-REACTIVE PROTEIN: CPT | Performed by: NURSE PRACTITIONER

## 2021-08-18 PROCEDURE — 80048 BASIC METABOLIC PNL TOTAL CA: CPT | Performed by: NURSE PRACTITIONER

## 2021-08-18 PROCEDURE — 81003 URINALYSIS AUTO W/O SCOPE: CPT | Performed by: NURSE PRACTITIONER

## 2021-08-18 PROCEDURE — 83690 ASSAY OF LIPASE: CPT | Performed by: NURSE PRACTITIONER

## 2021-08-18 PROCEDURE — 82150 ASSAY OF AMYLASE: CPT | Performed by: NURSE PRACTITIONER

## 2021-08-18 PROCEDURE — 85025 COMPLETE CBC W/AUTO DIFF WBC: CPT | Performed by: NURSE PRACTITIONER

## 2021-08-22 ENCOUNTER — LAB REQUISITION (OUTPATIENT)
Dept: LAB | Facility: HOSPITAL | Age: 71
End: 2021-08-22

## 2021-08-22 DIAGNOSIS — I15.9 SECONDARY HYPERTENSION, UNSPECIFIED: ICD-10-CM

## 2021-08-22 LAB
ALBUMIN SERPL-MCNC: 3.78 G/DL (ref 3.5–5.2)
ALP SERPL-CCNC: 126 U/L (ref 39–117)
ALT SERPL W P-5'-P-CCNC: 21 U/L (ref 1–33)
AST SERPL-CCNC: 19 U/L (ref 1–32)
BILIRUB CONJ SERPL-MCNC: <0.2 MG/DL (ref 0–0.3)
BILIRUB INDIRECT SERPL-MCNC: ABNORMAL MG/DL
BILIRUB SERPL-MCNC: 0.4 MG/DL (ref 0–1.2)
PROT SERPL-MCNC: 7.2 G/DL (ref 6–8.5)

## 2021-08-22 PROCEDURE — 80076 HEPATIC FUNCTION PANEL: CPT | Performed by: INTERNAL MEDICINE

## 2021-08-23 ENCOUNTER — TRANSCRIBE ORDERS (OUTPATIENT)
Dept: ADMINISTRATIVE | Facility: HOSPITAL | Age: 71
End: 2021-08-23

## 2021-08-23 DIAGNOSIS — R16.0 LIVER MASS, LEFT LOBE: Primary | ICD-10-CM

## 2021-09-21 ENCOUNTER — LAB REQUISITION (OUTPATIENT)
Dept: LAB | Facility: HOSPITAL | Age: 71
End: 2021-09-21

## 2021-09-21 DIAGNOSIS — E11.9 TYPE 2 DIABETES MELLITUS WITHOUT COMPLICATIONS (HCC): ICD-10-CM

## 2021-09-21 DIAGNOSIS — I10 ESSENTIAL (PRIMARY) HYPERTENSION: ICD-10-CM

## 2021-09-21 LAB
ALBUMIN SERPL-MCNC: 4.03 G/DL (ref 3.5–5.2)
ALBUMIN/GLOB SERPL: 1.2 G/DL
ALP SERPL-CCNC: 136 U/L (ref 39–117)
ALT SERPL W P-5'-P-CCNC: 14 U/L (ref 1–33)
ANION GAP SERPL CALCULATED.3IONS-SCNC: 12.6 MMOL/L (ref 5–15)
AST SERPL-CCNC: 14 U/L (ref 1–32)
BILIRUB SERPL-MCNC: 0.5 MG/DL (ref 0–1.2)
BUN SERPL-MCNC: 17 MG/DL (ref 8–23)
BUN/CREAT SERPL: 23.3 (ref 7–25)
CALCIUM SPEC-SCNC: 9.6 MG/DL (ref 8.6–10.5)
CHLORIDE SERPL-SCNC: 98 MMOL/L (ref 98–107)
CO2 SERPL-SCNC: 29.4 MMOL/L (ref 22–29)
CREAT SERPL-MCNC: 0.73 MG/DL (ref 0.57–1)
GFR SERPL CREATININE-BSD FRML MDRD: 78 ML/MIN/1.73
GLOBULIN UR ELPH-MCNC: 3.3 GM/DL
GLUCOSE SERPL-MCNC: 188 MG/DL (ref 65–99)
HBA1C MFR BLD: 10.4 % (ref 4.8–5.6)
POTASSIUM SERPL-SCNC: 3.9 MMOL/L (ref 3.5–5.2)
PROT SERPL-MCNC: 7.3 G/DL (ref 6–8.5)
SODIUM SERPL-SCNC: 140 MMOL/L (ref 136–145)

## 2021-09-21 PROCEDURE — 83036 HEMOGLOBIN GLYCOSYLATED A1C: CPT | Performed by: INTERNAL MEDICINE

## 2021-09-21 PROCEDURE — 80053 COMPREHEN METABOLIC PANEL: CPT | Performed by: INTERNAL MEDICINE

## 2021-12-14 ENCOUNTER — APPOINTMENT (OUTPATIENT)
Dept: CT IMAGING | Facility: HOSPITAL | Age: 71
End: 2021-12-14

## 2021-12-14 ENCOUNTER — APPOINTMENT (OUTPATIENT)
Dept: CARDIOLOGY | Facility: HOSPITAL | Age: 71
End: 2021-12-14

## 2021-12-14 ENCOUNTER — APPOINTMENT (OUTPATIENT)
Dept: GENERAL RADIOLOGY | Facility: HOSPITAL | Age: 71
End: 2021-12-14

## 2021-12-14 ENCOUNTER — HOSPITAL ENCOUNTER (INPATIENT)
Facility: HOSPITAL | Age: 71
LOS: 2 days | Discharge: NURSING, REHAB OR LONG-TERM CARE | End: 2021-12-18
Attending: EMERGENCY MEDICINE | Admitting: INTERNAL MEDICINE

## 2021-12-14 ENCOUNTER — APPOINTMENT (OUTPATIENT)
Dept: RESPIRATORY THERAPY | Facility: HOSPITAL | Age: 71
End: 2021-12-14

## 2021-12-14 ENCOUNTER — APPOINTMENT (OUTPATIENT)
Dept: MRI IMAGING | Facility: HOSPITAL | Age: 71
End: 2021-12-14

## 2021-12-14 DIAGNOSIS — R41.82 ALTERED MENTAL STATUS, UNSPECIFIED ALTERED MENTAL STATUS TYPE: Primary | ICD-10-CM

## 2021-12-14 PROBLEM — G93.40 ENCEPHALOPATHY ACUTE: Status: ACTIVE | Noted: 2021-12-14

## 2021-12-14 LAB
A-A DO2: 18.1 MMHG (ref 0–300)
ABO GROUP BLD: NORMAL
ACETONE BLD QL: NEGATIVE
ALBUMIN SERPL-MCNC: 3.8 G/DL (ref 3.5–5.2)
ALBUMIN/GLOB SERPL: 1.4 G/DL
ALP SERPL-CCNC: 124 U/L (ref 39–117)
ALT SERPL W P-5'-P-CCNC: 20 U/L (ref 1–33)
AMMONIA BLD-SCNC: 11 UMOL/L (ref 11–51)
ANION GAP SERPL CALCULATED.3IONS-SCNC: 13 MMOL/L (ref 5–15)
APTT PPP: 28.8 SECONDS (ref 25.5–35.4)
ARTERIAL PATENCY WRIST A: POSITIVE
AST SERPL-CCNC: 19 U/L (ref 1–32)
ATMOSPHERIC PRESS: 737 MMHG
BASE EXCESS BLDA CALC-SCNC: 7.1 MMOL/L (ref 0–2)
BASOPHILS # BLD AUTO: 0.06 10*3/MM3 (ref 0–0.2)
BASOPHILS NFR BLD AUTO: 0.5 % (ref 0–1.5)
BDY SITE: ABNORMAL
BH CV ECHO MEAS - % IVS THICK: -7.6 %
BH CV ECHO MEAS - % LVPW THICK: 10.7 %
BH CV ECHO MEAS - ACS: 2.1 CM
BH CV ECHO MEAS - AO MAX PG: 8.5 MMHG
BH CV ECHO MEAS - AO MEAN PG: 4 MMHG
BH CV ECHO MEAS - AO ROOT AREA (BSA CORRECTED): 1.6
BH CV ECHO MEAS - AO ROOT AREA: 9.1 CM^2
BH CV ECHO MEAS - AO ROOT DIAM: 3.4 CM
BH CV ECHO MEAS - AO V2 MAX: 146 CM/SEC
BH CV ECHO MEAS - AO V2 MEAN: 95.5 CM/SEC
BH CV ECHO MEAS - AO V2 VTI: 31.9 CM
BH CV ECHO MEAS - BSA(HAYCOCK): 2.4 M^2
BH CV ECHO MEAS - BSA: 2.2 M^2
BH CV ECHO MEAS - BZI_BMI: 47.7 KILOGRAMS/M^2
BH CV ECHO MEAS - BZI_METRIC_HEIGHT: 160 CM
BH CV ECHO MEAS - BZI_METRIC_WEIGHT: 122 KG
BH CV ECHO MEAS - EDV(CUBED): 64 ML
BH CV ECHO MEAS - EDV(MOD-SP4): 72.5 ML
BH CV ECHO MEAS - EDV(TEICH): 70 ML
BH CV ECHO MEAS - EF(CUBED): 71.6 %
BH CV ECHO MEAS - EF(MOD-SP4): 69 %
BH CV ECHO MEAS - EF(TEICH): 63.8 %
BH CV ECHO MEAS - ESV(CUBED): 18.2 ML
BH CV ECHO MEAS - ESV(MOD-SP4): 22.5 ML
BH CV ECHO MEAS - ESV(TEICH): 25.3 ML
BH CV ECHO MEAS - FS: 34.3 %
BH CV ECHO MEAS - IVS/LVPW: 1
BH CV ECHO MEAS - IVSD: 1.2 CM
BH CV ECHO MEAS - IVSS: 1.2 CM
BH CV ECHO MEAS - LA DIMENSION: 4.1 CM
BH CV ECHO MEAS - LA/AO: 1.2
BH CV ECHO MEAS - LV DIASTOLIC VOL/BSA (35-75): 33.1 ML/M^2
BH CV ECHO MEAS - LV MASS(C)D: 171.1 GRAMS
BH CV ECHO MEAS - LV MASS(C)DI: 78 GRAMS/M^2
BH CV ECHO MEAS - LV MASS(C)S: 97.1 GRAMS
BH CV ECHO MEAS - LV MASS(C)SI: 44.3 GRAMS/M^2
BH CV ECHO MEAS - LV SYSTOLIC VOL/BSA (12-30): 10.3 ML/M^2
BH CV ECHO MEAS - LVIDD: 4 CM
BH CV ECHO MEAS - LVIDS: 2.6 CM
BH CV ECHO MEAS - LVLD AP4: 6 CM
BH CV ECHO MEAS - LVLS AP4: 5.1 CM
BH CV ECHO MEAS - LVPWD: 1.2 CM
BH CV ECHO MEAS - LVPWS: 1.3 CM
BH CV ECHO MEAS - MV A MAX VEL: 114 CM/SEC
BH CV ECHO MEAS - MV E MAX VEL: 76.7 CM/SEC
BH CV ECHO MEAS - MV E/A: 0.67
BH CV ECHO MEAS - PA ACC TIME: 0.08 SEC
BH CV ECHO MEAS - PA PR(ACCEL): 44.4 MMHG
BH CV ECHO MEAS - SI(AO): 132 ML/M^2
BH CV ECHO MEAS - SI(CUBED): 20.9 ML/M^2
BH CV ECHO MEAS - SI(MOD-SP4): 22.8 ML/M^2
BH CV ECHO MEAS - SI(TEICH): 20.4 ML/M^2
BH CV ECHO MEAS - SV(AO): 289.6 ML
BH CV ECHO MEAS - SV(CUBED): 45.8 ML
BH CV ECHO MEAS - SV(MOD-SP4): 50 ML
BH CV ECHO MEAS - SV(TEICH): 44.7 ML
BILIRUB SERPL-MCNC: 0.6 MG/DL (ref 0–1.2)
BLD GP AB SCN SERPL QL: NEGATIVE
BODY TEMPERATURE: 0 C
BUN SERPL-MCNC: 13 MG/DL (ref 8–23)
BUN/CREAT SERPL: 14.6 (ref 7–25)
CALCIUM SPEC-SCNC: 9.2 MG/DL (ref 8.6–10.5)
CHLORIDE SERPL-SCNC: 98 MMOL/L (ref 98–107)
CO2 BLDA-SCNC: 34.2 MMOL/L (ref 22–33)
CO2 SERPL-SCNC: 28 MMOL/L (ref 22–29)
COHGB MFR BLD: 1.1 % (ref 0–5)
CREAT SERPL-MCNC: 0.89 MG/DL (ref 0.57–1)
CRP SERPL-MCNC: 1.3 MG/DL (ref 0–0.5)
D-LACTATE SERPL-SCNC: 1.6 MMOL/L (ref 0.5–2)
DEPRECATED RDW RBC AUTO: 41.8 FL (ref 37–54)
EOSINOPHIL # BLD AUTO: 0.32 10*3/MM3 (ref 0–0.4)
EOSINOPHIL NFR BLD AUTO: 2.9 % (ref 0.3–6.2)
ERYTHROCYTE [DISTWIDTH] IN BLOOD BY AUTOMATED COUNT: 12.6 % (ref 12.3–15.4)
ERYTHROCYTE [SEDIMENTATION RATE] IN BLOOD: 53 MM/HR (ref 0–30)
FLUAV SUBTYP SPEC NAA+PROBE: NOT DETECTED
FLUBV RNA ISLT QL NAA+PROBE: NOT DETECTED
GFR SERPL CREATININE-BSD FRML MDRD: 62 ML/MIN/1.73
GLOBULIN UR ELPH-MCNC: 2.7 GM/DL
GLUCOSE BLDC GLUCOMTR-MCNC: 257 MG/DL (ref 70–130)
GLUCOSE BLDC GLUCOMTR-MCNC: 258 MG/DL (ref 70–130)
GLUCOSE BLDC GLUCOMTR-MCNC: 262 MG/DL (ref 70–130)
GLUCOSE BLDC GLUCOMTR-MCNC: 263 MG/DL (ref 70–130)
GLUCOSE BLDC GLUCOMTR-MCNC: 274 MG/DL (ref 70–130)
GLUCOSE SERPL-MCNC: 290 MG/DL (ref 65–99)
HCO3 BLDA-SCNC: 32.7 MMOL/L (ref 20–26)
HCT VFR BLD AUTO: 43.7 % (ref 34–46.6)
HCT VFR BLD CALC: 43.7 % (ref 38–51)
HGB BLD-MCNC: 14 G/DL (ref 12–15.9)
HGB BLDA-MCNC: 14.3 G/DL (ref 13.5–17.5)
HOLD SPECIMEN: NORMAL
HOLD SPECIMEN: NORMAL
IMM GRANULOCYTES # BLD AUTO: 0.05 10*3/MM3 (ref 0–0.05)
IMM GRANULOCYTES NFR BLD AUTO: 0.5 % (ref 0–0.5)
INHALED O2 CONCENTRATION: 21 %
INR PPP: 0.91 (ref 0.9–1.1)
LYMPHOCYTES # BLD AUTO: 3.29 10*3/MM3 (ref 0.7–3.1)
LYMPHOCYTES NFR BLD AUTO: 29.6 % (ref 19.6–45.3)
Lab: ABNORMAL
MAGNESIUM SERPL-MCNC: 2 MG/DL (ref 1.6–2.4)
MAXIMAL PREDICTED HEART RATE: 147 BPM
MCH RBC QN AUTO: 29.2 PG (ref 26.6–33)
MCHC RBC AUTO-ENTMCNC: 32 G/DL (ref 31.5–35.7)
MCV RBC AUTO: 91.2 FL (ref 79–97)
METHGB BLD QL: 0.2 % (ref 0–3)
MODALITY: ABNORMAL
MONOCYTES # BLD AUTO: 0.61 10*3/MM3 (ref 0.1–0.9)
MONOCYTES NFR BLD AUTO: 5.5 % (ref 5–12)
NEUTROPHILS NFR BLD AUTO: 6.78 10*3/MM3 (ref 1.7–7)
NEUTROPHILS NFR BLD AUTO: 61 % (ref 42.7–76)
NOTE: ABNORMAL
NRBC BLD AUTO-RTO: 0 /100 WBC (ref 0–0.2)
OXYHGB MFR BLDV: 93.8 % (ref 94–99)
PCO2 BLDA: 48.7 MM HG (ref 35–45)
PCO2 TEMP ADJ BLD: ABNORMAL MM[HG]
PH BLDA: 7.44 PH UNITS (ref 7.35–7.45)
PH, TEMP CORRECTED: ABNORMAL
PLATELET # BLD AUTO: 284 10*3/MM3 (ref 140–450)
PMV BLD AUTO: 8.4 FL (ref 6–12)
PO2 BLDA: 71.8 MM HG (ref 83–108)
PO2 TEMP ADJ BLD: ABNORMAL MM[HG]
POTASSIUM SERPL-SCNC: 4.5 MMOL/L (ref 3.5–5.2)
PROT SERPL-MCNC: 6.5 G/DL (ref 6–8.5)
PROTHROMBIN TIME: 12.6 SECONDS (ref 12.8–14.5)
RBC # BLD AUTO: 4.79 10*6/MM3 (ref 3.77–5.28)
RH BLD: NEGATIVE
SAO2 % BLDCOA: 95 % (ref 94–99)
SARS-COV-2 RNA PNL SPEC NAA+PROBE: NOT DETECTED
SODIUM SERPL-SCNC: 139 MMOL/L (ref 136–145)
STRESS TARGET HR: 125 BPM
T&S EXPIRATION DATE: NORMAL
TROPONIN T SERPL-MCNC: <0.01 NG/ML (ref 0–0.03)
TSH SERPL DL<=0.05 MIU/L-ACNC: 2.83 UIU/ML (ref 0.27–4.2)
VENTILATOR MODE: ABNORMAL
WBC NRBC COR # BLD: 11.11 10*3/MM3 (ref 3.4–10.8)
WHOLE BLOOD HOLD SPECIMEN: NORMAL
WHOLE BLOOD HOLD SPECIMEN: NORMAL

## 2021-12-14 PROCEDURE — 92523 SPEECH SOUND LANG COMPREHEN: CPT

## 2021-12-14 PROCEDURE — 99222 1ST HOSP IP/OBS MODERATE 55: CPT | Performed by: INTERNAL MEDICINE

## 2021-12-14 PROCEDURE — 94640 AIRWAY INHALATION TREATMENT: CPT

## 2021-12-14 PROCEDURE — 70450 CT HEAD/BRAIN W/O DYE: CPT

## 2021-12-14 PROCEDURE — 92610 EVALUATE SWALLOWING FUNCTION: CPT

## 2021-12-14 PROCEDURE — 86900 BLOOD TYPING SEROLOGIC ABO: CPT | Performed by: PHYSICIAN ASSISTANT

## 2021-12-14 PROCEDURE — 82962 GLUCOSE BLOOD TEST: CPT

## 2021-12-14 PROCEDURE — 85610 PROTHROMBIN TIME: CPT | Performed by: PHYSICIAN ASSISTANT

## 2021-12-14 PROCEDURE — G0378 HOSPITAL OBSERVATION PER HR: HCPCS

## 2021-12-14 PROCEDURE — 86140 C-REACTIVE PROTEIN: CPT | Performed by: PHYSICIAN ASSISTANT

## 2021-12-14 PROCEDURE — 85652 RBC SED RATE AUTOMATED: CPT | Performed by: PHYSICIAN ASSISTANT

## 2021-12-14 PROCEDURE — 95819 EEG AWAKE AND ASLEEP: CPT

## 2021-12-14 PROCEDURE — 82375 ASSAY CARBOXYHB QUANT: CPT

## 2021-12-14 PROCEDURE — 82805 BLOOD GASES W/O2 SATURATION: CPT

## 2021-12-14 PROCEDURE — 82140 ASSAY OF AMMONIA: CPT | Performed by: PHYSICIAN ASSISTANT

## 2021-12-14 PROCEDURE — 63710000001 INSULIN ASPART PER 5 UNITS: Performed by: INTERNAL MEDICINE

## 2021-12-14 PROCEDURE — 93306 TTE W/DOPPLER COMPLETE: CPT

## 2021-12-14 PROCEDURE — 85025 COMPLETE CBC W/AUTO DIFF WBC: CPT | Performed by: PHYSICIAN ASSISTANT

## 2021-12-14 PROCEDURE — 85730 THROMBOPLASTIN TIME PARTIAL: CPT | Performed by: PHYSICIAN ASSISTANT

## 2021-12-14 PROCEDURE — 93010 ELECTROCARDIOGRAM REPORT: CPT | Performed by: INTERNAL MEDICINE

## 2021-12-14 PROCEDURE — 70450 CT HEAD/BRAIN W/O DYE: CPT | Performed by: RADIOLOGY

## 2021-12-14 PROCEDURE — 83050 HGB METHEMOGLOBIN QUAN: CPT

## 2021-12-14 PROCEDURE — 82009 KETONE BODYS QUAL: CPT | Performed by: PHYSICIAN ASSISTANT

## 2021-12-14 PROCEDURE — 93306 TTE W/DOPPLER COMPLETE: CPT | Performed by: SPECIALIST

## 2021-12-14 PROCEDURE — 84443 ASSAY THYROID STIM HORMONE: CPT | Performed by: PHYSICIAN ASSISTANT

## 2021-12-14 PROCEDURE — 99221 1ST HOSP IP/OBS SF/LOW 40: CPT | Performed by: PSYCHIATRY & NEUROLOGY

## 2021-12-14 PROCEDURE — 71045 X-RAY EXAM CHEST 1 VIEW: CPT | Performed by: RADIOLOGY

## 2021-12-14 PROCEDURE — 94799 UNLISTED PULMONARY SVC/PX: CPT

## 2021-12-14 PROCEDURE — 93005 ELECTROCARDIOGRAM TRACING: CPT | Performed by: PHYSICIAN ASSISTANT

## 2021-12-14 PROCEDURE — 87040 BLOOD CULTURE FOR BACTERIA: CPT | Performed by: PHYSICIAN ASSISTANT

## 2021-12-14 PROCEDURE — 36600 WITHDRAWAL OF ARTERIAL BLOOD: CPT

## 2021-12-14 PROCEDURE — 83735 ASSAY OF MAGNESIUM: CPT | Performed by: PHYSICIAN ASSISTANT

## 2021-12-14 PROCEDURE — 99285 EMERGENCY DEPT VISIT HI MDM: CPT

## 2021-12-14 PROCEDURE — 84484 ASSAY OF TROPONIN QUANT: CPT | Performed by: PHYSICIAN ASSISTANT

## 2021-12-14 PROCEDURE — 93005 ELECTROCARDIOGRAM TRACING: CPT | Performed by: EMERGENCY MEDICINE

## 2021-12-14 PROCEDURE — 70551 MRI BRAIN STEM W/O DYE: CPT

## 2021-12-14 PROCEDURE — 70551 MRI BRAIN STEM W/O DYE: CPT | Performed by: RADIOLOGY

## 2021-12-14 PROCEDURE — 87636 SARSCOV2 & INF A&B AMP PRB: CPT | Performed by: PHYSICIAN ASSISTANT

## 2021-12-14 PROCEDURE — 83605 ASSAY OF LACTIC ACID: CPT | Performed by: PHYSICIAN ASSISTANT

## 2021-12-14 PROCEDURE — 71045 X-RAY EXAM CHEST 1 VIEW: CPT

## 2021-12-14 PROCEDURE — 86901 BLOOD TYPING SEROLOGIC RH(D): CPT | Performed by: PHYSICIAN ASSISTANT

## 2021-12-14 PROCEDURE — 86850 RBC ANTIBODY SCREEN: CPT | Performed by: PHYSICIAN ASSISTANT

## 2021-12-14 PROCEDURE — 80053 COMPREHEN METABOLIC PANEL: CPT | Performed by: PHYSICIAN ASSISTANT

## 2021-12-14 RX ORDER — PANTOPRAZOLE SODIUM 40 MG/1
40 TABLET, DELAYED RELEASE ORAL EVERY MORNING
Status: DISCONTINUED | OUTPATIENT
Start: 2021-12-15 | End: 2021-12-18 | Stop reason: HOSPADM

## 2021-12-14 RX ORDER — TIZANIDINE 4 MG/1
2 TABLET ORAL 2 TIMES DAILY
Status: CANCELLED | OUTPATIENT
Start: 2021-12-14

## 2021-12-14 RX ORDER — AMLODIPINE BESYLATE 2.5 MG/1
2.5 TABLET ORAL DAILY
COMMUNITY
End: 2021-12-18 | Stop reason: HOSPADM

## 2021-12-14 RX ORDER — ASPIRIN 325 MG
325 TABLET ORAL DAILY
Status: DISCONTINUED | OUTPATIENT
Start: 2021-12-14 | End: 2021-12-18 | Stop reason: HOSPADM

## 2021-12-14 RX ORDER — DONEPEZIL HYDROCHLORIDE 5 MG/1
5 TABLET, FILM COATED ORAL DAILY
Status: DISCONTINUED | OUTPATIENT
Start: 2021-12-14 | End: 2021-12-18 | Stop reason: HOSPADM

## 2021-12-14 RX ORDER — TRIAMTERENE AND HYDROCHLOROTHIAZIDE 37.5; 25 MG/1; MG/1
1 TABLET ORAL DAILY
Status: DISCONTINUED | OUTPATIENT
Start: 2021-12-15 | End: 2021-12-18 | Stop reason: HOSPADM

## 2021-12-14 RX ORDER — ALBUTEROL SULFATE 90 UG/1
2 AEROSOL, METERED RESPIRATORY (INHALATION) EVERY 6 HOURS PRN
Status: CANCELLED | OUTPATIENT
Start: 2021-12-14

## 2021-12-14 RX ORDER — BUDESONIDE 0.5 MG/2ML
0.5 INHALANT ORAL
Status: CANCELLED | OUTPATIENT
Start: 2021-12-14

## 2021-12-14 RX ORDER — SODIUM CHLORIDE 0.9 % (FLUSH) 0.9 %
10 SYRINGE (ML) INJECTION EVERY 12 HOURS SCHEDULED
Status: DISCONTINUED | OUTPATIENT
Start: 2021-12-14 | End: 2021-12-18 | Stop reason: HOSPADM

## 2021-12-14 RX ORDER — BUDESONIDE 0.5 MG/2ML
0.5 INHALANT ORAL
Status: DISCONTINUED | OUTPATIENT
Start: 2021-12-14 | End: 2021-12-18 | Stop reason: HOSPADM

## 2021-12-14 RX ORDER — IPRATROPIUM BROMIDE AND ALBUTEROL SULFATE 2.5; .5 MG/3ML; MG/3ML
3 SOLUTION RESPIRATORY (INHALATION) EVERY 4 HOURS PRN
Status: CANCELLED | OUTPATIENT
Start: 2021-12-14

## 2021-12-14 RX ORDER — POTASSIUM CHLORIDE 20 MEQ/1
10 TABLET, EXTENDED RELEASE ORAL DAILY
Status: CANCELLED | OUTPATIENT
Start: 2021-12-14

## 2021-12-14 RX ORDER — MIDODRINE HYDROCHLORIDE 2.5 MG/1
5 TABLET ORAL
Status: DISCONTINUED | OUTPATIENT
Start: 2021-12-15 | End: 2021-12-18 | Stop reason: HOSPADM

## 2021-12-14 RX ORDER — ASPIRIN 300 MG/1
300 SUPPOSITORY RECTAL ONCE
Status: DISCONTINUED | OUTPATIENT
Start: 2021-12-14 | End: 2021-12-14

## 2021-12-14 RX ORDER — IPRATROPIUM BROMIDE AND ALBUTEROL SULFATE 2.5; .5 MG/3ML; MG/3ML
3 SOLUTION RESPIRATORY (INHALATION)
COMMUNITY

## 2021-12-14 RX ORDER — ASPIRIN 300 MG/1
300 SUPPOSITORY RECTAL DAILY
Status: DISCONTINUED | OUTPATIENT
Start: 2021-12-14 | End: 2021-12-15

## 2021-12-14 RX ORDER — TRIAMTERENE AND HYDROCHLOROTHIAZIDE 37.5; 25 MG/1; MG/1
1 TABLET ORAL DAILY
Status: CANCELLED | OUTPATIENT
Start: 2021-12-14

## 2021-12-14 RX ORDER — BENZONATATE 100 MG/1
100 CAPSULE ORAL 3 TIMES DAILY PRN
Status: DISCONTINUED | OUTPATIENT
Start: 2021-12-14 | End: 2021-12-18 | Stop reason: HOSPADM

## 2021-12-14 RX ORDER — NICOTINE POLACRILEX 4 MG
15 LOZENGE BUCCAL
Status: DISCONTINUED | OUTPATIENT
Start: 2021-12-14 | End: 2021-12-18 | Stop reason: HOSPADM

## 2021-12-14 RX ORDER — FUROSEMIDE 20 MG/1
20 TABLET ORAL EVERY OTHER DAY
Status: DISCONTINUED | OUTPATIENT
Start: 2021-12-14 | End: 2021-12-18 | Stop reason: HOSPADM

## 2021-12-14 RX ORDER — MIDODRINE HYDROCHLORIDE 5 MG/1
5 TABLET ORAL 2 TIMES DAILY
COMMUNITY

## 2021-12-14 RX ORDER — BENZONATATE 100 MG/1
100 CAPSULE ORAL 3 TIMES DAILY PRN
COMMUNITY

## 2021-12-14 RX ORDER — POTASSIUM CHLORIDE 750 MG/1
10 TABLET, FILM COATED, EXTENDED RELEASE ORAL DAILY
Status: DISCONTINUED | OUTPATIENT
Start: 2021-12-15 | End: 2021-12-18 | Stop reason: HOSPADM

## 2021-12-14 RX ORDER — SODIUM CHLORIDE 0.9 % (FLUSH) 0.9 %
10 SYRINGE (ML) INJECTION AS NEEDED
Status: DISCONTINUED | OUTPATIENT
Start: 2021-12-14 | End: 2021-12-14

## 2021-12-14 RX ORDER — SOTALOL HYDROCHLORIDE 80 MG/1
80 TABLET ORAL EVERY 12 HOURS SCHEDULED
Status: CANCELLED | OUTPATIENT
Start: 2021-12-14

## 2021-12-14 RX ORDER — PANTOPRAZOLE SODIUM 40 MG/1
40 TABLET, DELAYED RELEASE ORAL EVERY MORNING
Status: CANCELLED | OUTPATIENT
Start: 2021-12-15

## 2021-12-14 RX ORDER — MIDODRINE HYDROCHLORIDE 2.5 MG/1
5 TABLET ORAL
Status: CANCELLED | OUTPATIENT
Start: 2021-12-14

## 2021-12-14 RX ORDER — NITROGLYCERIN 0.4 MG/1
0.4 TABLET SUBLINGUAL
Status: DISCONTINUED | OUTPATIENT
Start: 2021-12-14 | End: 2021-12-18 | Stop reason: HOSPADM

## 2021-12-14 RX ORDER — AMLODIPINE BESYLATE 5 MG/1
2.5 TABLET ORAL DAILY
Status: DISCONTINUED | OUTPATIENT
Start: 2021-12-14 | End: 2021-12-16

## 2021-12-14 RX ORDER — FUROSEMIDE 40 MG/1
20 TABLET ORAL
Status: CANCELLED | OUTPATIENT
Start: 2021-12-14

## 2021-12-14 RX ORDER — DONEPEZIL HYDROCHLORIDE 5 MG/1
5 TABLET, FILM COATED ORAL DAILY
Status: CANCELLED | OUTPATIENT
Start: 2021-12-14

## 2021-12-14 RX ORDER — DEXTROSE MONOHYDRATE 25 G/50ML
25 INJECTION, SOLUTION INTRAVENOUS
Status: DISCONTINUED | OUTPATIENT
Start: 2021-12-14 | End: 2021-12-18 | Stop reason: HOSPADM

## 2021-12-14 RX ORDER — DULOXETIN HYDROCHLORIDE 30 MG/1
30 CAPSULE, DELAYED RELEASE ORAL DAILY
Status: CANCELLED | OUTPATIENT
Start: 2021-12-14

## 2021-12-14 RX ORDER — AMLODIPINE BESYLATE 5 MG/1
2.5 TABLET ORAL DAILY
Status: CANCELLED | OUTPATIENT
Start: 2021-12-14

## 2021-12-14 RX ORDER — ASPIRIN 81 MG/1
324 TABLET, CHEWABLE ORAL ONCE
Status: COMPLETED | OUTPATIENT
Start: 2021-12-14 | End: 2021-12-14

## 2021-12-14 RX ORDER — SOTALOL HYDROCHLORIDE 80 MG/1
80 TABLET ORAL EVERY 12 HOURS SCHEDULED
Status: DISCONTINUED | OUTPATIENT
Start: 2021-12-14 | End: 2021-12-18 | Stop reason: HOSPADM

## 2021-12-14 RX ORDER — MONTELUKAST SODIUM 10 MG/1
10 TABLET ORAL NIGHTLY
Status: DISCONTINUED | OUTPATIENT
Start: 2021-12-14 | End: 2021-12-18 | Stop reason: HOSPADM

## 2021-12-14 RX ORDER — MONTELUKAST SODIUM 10 MG/1
10 TABLET ORAL DAILY
Status: CANCELLED | OUTPATIENT
Start: 2021-12-14

## 2021-12-14 RX ORDER — TIZANIDINE 4 MG/1
2 TABLET ORAL EVERY 12 HOURS SCHEDULED
Status: CANCELLED | OUTPATIENT
Start: 2021-12-14

## 2021-12-14 RX ORDER — IPRATROPIUM BROMIDE AND ALBUTEROL SULFATE 2.5; .5 MG/3ML; MG/3ML
3 SOLUTION RESPIRATORY (INHALATION)
Status: DISCONTINUED | OUTPATIENT
Start: 2021-12-14 | End: 2021-12-18 | Stop reason: HOSPADM

## 2021-12-14 RX ORDER — INSULIN GLARGINE 100 [IU]/ML
45 INJECTION, SOLUTION SUBCUTANEOUS 2 TIMES DAILY
COMMUNITY

## 2021-12-14 RX ORDER — ALBUTEROL SULFATE 2.5 MG/3ML
2.5 SOLUTION RESPIRATORY (INHALATION) EVERY 6 HOURS PRN
Status: DISCONTINUED | OUTPATIENT
Start: 2021-12-14 | End: 2021-12-18 | Stop reason: HOSPADM

## 2021-12-14 RX ORDER — SODIUM CHLORIDE 0.9 % (FLUSH) 0.9 %
10 SYRINGE (ML) INJECTION AS NEEDED
Status: DISCONTINUED | OUTPATIENT
Start: 2021-12-14 | End: 2021-12-18 | Stop reason: HOSPADM

## 2021-12-14 RX ORDER — ATORVASTATIN CALCIUM 40 MG/1
80 TABLET, FILM COATED ORAL NIGHTLY
Status: DISCONTINUED | OUTPATIENT
Start: 2021-12-14 | End: 2021-12-18 | Stop reason: HOSPADM

## 2021-12-14 RX ADMIN — IPRATROPIUM BROMIDE AND ALBUTEROL SULFATE 3 ML: .5; 2.5 SOLUTION RESPIRATORY (INHALATION) at 20:15

## 2021-12-14 RX ADMIN — INSULIN ASPART 4 UNITS: 100 INJECTION, SOLUTION INTRAVENOUS; SUBCUTANEOUS at 17:45

## 2021-12-14 RX ADMIN — AMLODIPINE BESYLATE 2.5 MG: 5 TABLET ORAL at 20:20

## 2021-12-14 RX ADMIN — SODIUM CHLORIDE, PRESERVATIVE FREE 10 ML: 5 INJECTION INTRAVENOUS at 20:20

## 2021-12-14 RX ADMIN — SOTALOL HYDROCHLORIDE 80 MG: 80 TABLET ORAL at 20:20

## 2021-12-14 RX ADMIN — FUROSEMIDE 20 MG: 20 TABLET ORAL at 20:20

## 2021-12-14 RX ADMIN — BUDESONIDE 0.5 MG: 0.5 INHALANT RESPIRATORY (INHALATION) at 20:15

## 2021-12-14 RX ADMIN — ASPIRIN 324 MG: 81 TABLET, CHEWABLE ORAL at 13:55

## 2021-12-14 RX ADMIN — DONEPEZIL HYDROCHLORIDE 5 MG: 5 TABLET, FILM COATED ORAL at 20:20

## 2021-12-14 RX ADMIN — ATORVASTATIN CALCIUM 80 MG: 40 TABLET, FILM COATED ORAL at 20:20

## 2021-12-14 RX ADMIN — MONTELUKAST SODIUM 10 MG: 10 TABLET, COATED ORAL at 20:20

## 2021-12-15 LAB
ABO GROUP BLD: NORMAL
ALBUMIN SERPL-MCNC: 3.34 G/DL (ref 3.5–5.2)
ALBUMIN/GLOB SERPL: 1 G/DL
ALP SERPL-CCNC: 136 U/L (ref 39–117)
ALT SERPL W P-5'-P-CCNC: 18 U/L (ref 1–33)
AMPHET+METHAMPHET UR QL: NEGATIVE
AMPHETAMINES UR QL: NEGATIVE
ANION GAP SERPL CALCULATED.3IONS-SCNC: 9 MMOL/L (ref 5–15)
AST SERPL-CCNC: 18 U/L (ref 1–32)
BACTERIA UR QL AUTO: ABNORMAL /HPF
BARBITURATES UR QL SCN: NEGATIVE
BASOPHILS # BLD AUTO: 0.04 10*3/MM3 (ref 0–0.2)
BASOPHILS NFR BLD AUTO: 0.3 % (ref 0–1.5)
BENZODIAZ UR QL SCN: NEGATIVE
BILIRUB SERPL-MCNC: 0.4 MG/DL (ref 0–1.2)
BILIRUB UR QL STRIP: NEGATIVE
BUN SERPL-MCNC: 15 MG/DL (ref 8–23)
BUN/CREAT SERPL: 15 (ref 7–25)
BUPRENORPHINE SERPL-MCNC: NEGATIVE NG/ML
CALCIUM SPEC-SCNC: 9 MG/DL (ref 8.6–10.5)
CANNABINOIDS SERPL QL: NEGATIVE
CHLORIDE SERPL-SCNC: 97 MMOL/L (ref 98–107)
CHOLEST SERPL-MCNC: 165 MG/DL (ref 0–200)
CLARITY UR: ABNORMAL
CO2 SERPL-SCNC: 27 MMOL/L (ref 22–29)
COCAINE UR QL: NEGATIVE
COLOR UR: YELLOW
CREAT SERPL-MCNC: 1 MG/DL (ref 0.57–1)
DEPRECATED RDW RBC AUTO: 43.4 FL (ref 37–54)
EOSINOPHIL # BLD AUTO: 0.32 10*3/MM3 (ref 0–0.4)
EOSINOPHIL NFR BLD AUTO: 2.4 % (ref 0.3–6.2)
ERYTHROCYTE [DISTWIDTH] IN BLOOD BY AUTOMATED COUNT: 12.7 % (ref 12.3–15.4)
GFR SERPL CREATININE-BSD FRML MDRD: 54 ML/MIN/1.73
GLOBULIN UR ELPH-MCNC: 3.3 GM/DL
GLUCOSE BLDC GLUCOMTR-MCNC: 256 MG/DL (ref 70–130)
GLUCOSE BLDC GLUCOMTR-MCNC: 312 MG/DL (ref 70–130)
GLUCOSE BLDC GLUCOMTR-MCNC: 314 MG/DL (ref 70–130)
GLUCOSE BLDC GLUCOMTR-MCNC: 330 MG/DL (ref 70–130)
GLUCOSE SERPL-MCNC: 325 MG/DL (ref 65–99)
GLUCOSE UR STRIP-MCNC: ABNORMAL MG/DL
HBA1C MFR BLD: 10.9 % (ref 4.8–5.6)
HCT VFR BLD AUTO: 41.2 % (ref 34–46.6)
HDLC SERPL-MCNC: 40 MG/DL (ref 40–60)
HGB BLD-MCNC: 13 G/DL (ref 12–15.9)
HGB UR QL STRIP.AUTO: NEGATIVE
HYALINE CASTS UR QL AUTO: ABNORMAL /LPF
IMM GRANULOCYTES # BLD AUTO: 0.04 10*3/MM3 (ref 0–0.05)
IMM GRANULOCYTES NFR BLD AUTO: 0.3 % (ref 0–0.5)
KETONES UR QL STRIP: ABNORMAL
LDLC SERPL CALC-MCNC: 91 MG/DL (ref 0–100)
LDLC/HDLC SERPL: 2.13 {RATIO}
LEUKOCYTE ESTERASE UR QL STRIP.AUTO: ABNORMAL
LYMPHOCYTES # BLD AUTO: 4.65 10*3/MM3 (ref 0.7–3.1)
LYMPHOCYTES NFR BLD AUTO: 35.3 % (ref 19.6–45.3)
MAGNESIUM SERPL-MCNC: 2.2 MG/DL (ref 1.6–2.4)
MCH RBC QN AUTO: 29.3 PG (ref 26.6–33)
MCHC RBC AUTO-ENTMCNC: 31.6 G/DL (ref 31.5–35.7)
MCV RBC AUTO: 92.8 FL (ref 79–97)
METHADONE UR QL SCN: NEGATIVE
MONOCYTES # BLD AUTO: 0.79 10*3/MM3 (ref 0.1–0.9)
MONOCYTES NFR BLD AUTO: 6 % (ref 5–12)
NEUTROPHILS NFR BLD AUTO: 55.7 % (ref 42.7–76)
NEUTROPHILS NFR BLD AUTO: 7.35 10*3/MM3 (ref 1.7–7)
NITRITE UR QL STRIP: NEGATIVE
NRBC BLD AUTO-RTO: 0 /100 WBC (ref 0–0.2)
OPIATES UR QL: NEGATIVE
OXYCODONE UR QL SCN: NEGATIVE
PCP UR QL SCN: NEGATIVE
PH UR STRIP.AUTO: 5.5 [PH] (ref 5–8)
PLATELET # BLD AUTO: 265 10*3/MM3 (ref 140–450)
PMV BLD AUTO: 8.5 FL (ref 6–12)
POTASSIUM SERPL-SCNC: 4 MMOL/L (ref 3.5–5.2)
PROPOXYPH UR QL: NEGATIVE
PROT SERPL-MCNC: 6.6 G/DL (ref 6–8.5)
PROT UR QL STRIP: NEGATIVE
QT INTERVAL: 382 MS
QT INTERVAL: 434 MS
QTC INTERVAL: 469 MS
QTC INTERVAL: 478 MS
RBC # BLD AUTO: 4.44 10*6/MM3 (ref 3.77–5.28)
RBC # UR STRIP: ABNORMAL /HPF
REF LAB TEST METHOD: ABNORMAL
RH BLD: NEGATIVE
SODIUM SERPL-SCNC: 133 MMOL/L (ref 136–145)
SP GR UR STRIP: 1.02 (ref 1–1.03)
SQUAMOUS #/AREA URNS HPF: ABNORMAL /HPF
TRICYCLICS UR QL SCN: NEGATIVE
TRIGL SERPL-MCNC: 200 MG/DL (ref 0–150)
UROBILINOGEN UR QL STRIP: ABNORMAL
VLDLC SERPL-MCNC: 34 MG/DL (ref 5–40)
WBC # UR STRIP: ABNORMAL /HPF
WBC NRBC COR # BLD: 13.19 10*3/MM3 (ref 3.4–10.8)

## 2021-12-15 PROCEDURE — 25010000002 CEFTRIAXONE PER 250 MG: Performed by: PHYSICIAN ASSISTANT

## 2021-12-15 PROCEDURE — 99232 SBSQ HOSP IP/OBS MODERATE 35: CPT | Performed by: NURSE PRACTITIONER

## 2021-12-15 PROCEDURE — 80306 DRUG TEST PRSMV INSTRMNT: CPT | Performed by: INTERNAL MEDICINE

## 2021-12-15 PROCEDURE — 83735 ASSAY OF MAGNESIUM: CPT | Performed by: PHYSICIAN ASSISTANT

## 2021-12-15 PROCEDURE — 93010 ELECTROCARDIOGRAM REPORT: CPT | Performed by: INTERNAL MEDICINE

## 2021-12-15 PROCEDURE — 63710000001 INSULIN ASPART PER 5 UNITS: Performed by: INTERNAL MEDICINE

## 2021-12-15 PROCEDURE — 86901 BLOOD TYPING SEROLOGIC RH(D): CPT

## 2021-12-15 PROCEDURE — 94799 UNLISTED PULMONARY SVC/PX: CPT

## 2021-12-15 PROCEDURE — 81001 URINALYSIS AUTO W/SCOPE: CPT | Performed by: INTERNAL MEDICINE

## 2021-12-15 PROCEDURE — 99233 SBSQ HOSP IP/OBS HIGH 50: CPT | Performed by: PHYSICIAN ASSISTANT

## 2021-12-15 PROCEDURE — 97162 PT EVAL MOD COMPLEX 30 MIN: CPT

## 2021-12-15 PROCEDURE — G0378 HOSPITAL OBSERVATION PER HR: HCPCS

## 2021-12-15 PROCEDURE — 82962 GLUCOSE BLOOD TEST: CPT

## 2021-12-15 PROCEDURE — 87088 URINE BACTERIA CULTURE: CPT | Performed by: INTERNAL MEDICINE

## 2021-12-15 PROCEDURE — 87186 SC STD MICRODIL/AGAR DIL: CPT | Performed by: INTERNAL MEDICINE

## 2021-12-15 PROCEDURE — 80053 COMPREHEN METABOLIC PANEL: CPT | Performed by: INTERNAL MEDICINE

## 2021-12-15 PROCEDURE — 87086 URINE CULTURE/COLONY COUNT: CPT | Performed by: INTERNAL MEDICINE

## 2021-12-15 PROCEDURE — 83036 HEMOGLOBIN GLYCOSYLATED A1C: CPT | Performed by: INTERNAL MEDICINE

## 2021-12-15 PROCEDURE — 85025 COMPLETE CBC W/AUTO DIFF WBC: CPT | Performed by: INTERNAL MEDICINE

## 2021-12-15 PROCEDURE — 80061 LIPID PANEL: CPT | Performed by: INTERNAL MEDICINE

## 2021-12-15 PROCEDURE — 93005 ELECTROCARDIOGRAM TRACING: CPT | Performed by: INTERNAL MEDICINE

## 2021-12-15 PROCEDURE — 63710000001 INSULIN DETEMIR PER 5 UNITS: Performed by: INTERNAL MEDICINE

## 2021-12-15 PROCEDURE — 86900 BLOOD TYPING SEROLOGIC ABO: CPT

## 2021-12-15 RX ORDER — MAGNESIUM SULFATE HEPTAHYDRATE 40 MG/ML
2 INJECTION, SOLUTION INTRAVENOUS AS NEEDED
Status: DISCONTINUED | OUTPATIENT
Start: 2021-12-15 | End: 2021-12-18 | Stop reason: HOSPADM

## 2021-12-15 RX ORDER — ACETAMINOPHEN 325 MG/1
650 TABLET ORAL EVERY 6 HOURS PRN
Status: DISCONTINUED | OUTPATIENT
Start: 2021-12-15 | End: 2021-12-18 | Stop reason: HOSPADM

## 2021-12-15 RX ORDER — ACETAMINOPHEN 325 MG/1
650 TABLET ORAL ONCE
Status: COMPLETED | OUTPATIENT
Start: 2021-12-15 | End: 2021-12-15

## 2021-12-15 RX ORDER — MAGNESIUM SULFATE 1 G/100ML
1 INJECTION INTRAVENOUS AS NEEDED
Status: DISCONTINUED | OUTPATIENT
Start: 2021-12-15 | End: 2021-12-18 | Stop reason: HOSPADM

## 2021-12-15 RX ORDER — MAGNESIUM SULFATE HEPTAHYDRATE 40 MG/ML
4 INJECTION, SOLUTION INTRAVENOUS AS NEEDED
Status: DISCONTINUED | OUTPATIENT
Start: 2021-12-15 | End: 2021-12-18 | Stop reason: HOSPADM

## 2021-12-15 RX ADMIN — POTASSIUM CHLORIDE 10 MEQ: 750 TABLET, FILM COATED, EXTENDED RELEASE ORAL at 09:11

## 2021-12-15 RX ADMIN — BUDESONIDE 0.5 MG: 0.5 INHALANT RESPIRATORY (INHALATION) at 07:31

## 2021-12-15 RX ADMIN — SODIUM CHLORIDE, PRESERVATIVE FREE 10 ML: 5 INJECTION INTRAVENOUS at 10:55

## 2021-12-15 RX ADMIN — SODIUM CHLORIDE, PRESERVATIVE FREE 10 ML: 5 INJECTION INTRAVENOUS at 20:29

## 2021-12-15 RX ADMIN — SODIUM CHLORIDE, PRESERVATIVE FREE 10 ML: 5 INJECTION INTRAVENOUS at 20:28

## 2021-12-15 RX ADMIN — ACETAMINOPHEN 650 MG: 325 TABLET ORAL at 12:09

## 2021-12-15 RX ADMIN — IPRATROPIUM BROMIDE AND ALBUTEROL SULFATE 3 ML: .5; 2.5 SOLUTION RESPIRATORY (INHALATION) at 18:52

## 2021-12-15 RX ADMIN — INSULIN DETEMIR 35 UNITS: 100 INJECTION, SOLUTION SUBCUTANEOUS at 20:29

## 2021-12-15 RX ADMIN — CARBIDOPA AND LEVODOPA 5 MG: 50; 200 TABLET, EXTENDED RELEASE ORAL at 17:38

## 2021-12-15 RX ADMIN — INSULIN ASPART 4 UNITS: 100 INJECTION, SOLUTION INTRAVENOUS; SUBCUTANEOUS at 17:37

## 2021-12-15 RX ADMIN — ASPIRIN 325 MG: 325 TABLET ORAL at 10:55

## 2021-12-15 RX ADMIN — BUDESONIDE 0.5 MG: 0.5 INHALANT RESPIRATORY (INHALATION) at 18:52

## 2021-12-15 RX ADMIN — SODIUM CHLORIDE, PRESERVATIVE FREE 10 ML: 5 INJECTION INTRAVENOUS at 10:56

## 2021-12-15 RX ADMIN — IPRATROPIUM BROMIDE AND ALBUTEROL SULFATE 3 ML: .5; 2.5 SOLUTION RESPIRATORY (INHALATION) at 07:32

## 2021-12-15 RX ADMIN — MONTELUKAST SODIUM 10 MG: 10 TABLET, COATED ORAL at 20:29

## 2021-12-15 RX ADMIN — CEFTRIAXONE 1 G: 1 INJECTION, POWDER, FOR SOLUTION INTRAMUSCULAR; INTRAVENOUS at 10:55

## 2021-12-15 RX ADMIN — ATORVASTATIN CALCIUM 80 MG: 40 TABLET, FILM COATED ORAL at 20:29

## 2021-12-15 RX ADMIN — IPRATROPIUM BROMIDE AND ALBUTEROL SULFATE 3 ML: .5; 2.5 SOLUTION RESPIRATORY (INHALATION) at 13:31

## 2021-12-15 RX ADMIN — SOTALOL HYDROCHLORIDE 80 MG: 80 TABLET ORAL at 20:28

## 2021-12-15 RX ADMIN — INSULIN ASPART 14 UNITS: 100 INJECTION, SOLUTION INTRAVENOUS; SUBCUTANEOUS at 12:08

## 2021-12-15 RX ADMIN — PANTOPRAZOLE SODIUM 40 MG: 40 TABLET, DELAYED RELEASE ORAL at 05:21

## 2021-12-15 RX ADMIN — TRIAMTERENE AND HYDROCHLOROTHIAZIDE 1 TABLET: 37.5; 25 TABLET ORAL at 09:11

## 2021-12-15 RX ADMIN — SOTALOL HYDROCHLORIDE 80 MG: 80 TABLET ORAL at 09:11

## 2021-12-15 RX ADMIN — INSULIN ASPART 5 UNITS: 100 INJECTION, SOLUTION INTRAVENOUS; SUBCUTANEOUS at 12:08

## 2021-12-15 RX ADMIN — INSULIN ASPART 14 UNITS: 100 INJECTION, SOLUTION INTRAVENOUS; SUBCUTANEOUS at 17:38

## 2021-12-16 PROBLEM — R41.82 ALTERED MENTAL STATUS: Status: ACTIVE | Noted: 2021-12-16

## 2021-12-16 LAB
ALBUMIN SERPL-MCNC: 3.48 G/DL (ref 3.5–5.2)
ALBUMIN/GLOB SERPL: 1 G/DL
ALP SERPL-CCNC: 110 U/L (ref 39–117)
ALT SERPL W P-5'-P-CCNC: 17 U/L (ref 1–33)
ANION GAP SERPL CALCULATED.3IONS-SCNC: 13.1 MMOL/L (ref 5–15)
AST SERPL-CCNC: 16 U/L (ref 1–32)
BASOPHILS # BLD AUTO: 0.04 10*3/MM3 (ref 0–0.2)
BASOPHILS NFR BLD AUTO: 0.4 % (ref 0–1.5)
BILIRUB SERPL-MCNC: 0.4 MG/DL (ref 0–1.2)
BUN SERPL-MCNC: 14 MG/DL (ref 8–23)
BUN/CREAT SERPL: 18.9 (ref 7–25)
CALCIUM SPEC-SCNC: 9.5 MG/DL (ref 8.6–10.5)
CHLORIDE SERPL-SCNC: 99 MMOL/L (ref 98–107)
CO2 SERPL-SCNC: 24.9 MMOL/L (ref 22–29)
CREAT SERPL-MCNC: 0.74 MG/DL (ref 0.57–1)
DEPRECATED RDW RBC AUTO: 42.6 FL (ref 37–54)
EOSINOPHIL # BLD AUTO: 0.41 10*3/MM3 (ref 0–0.4)
EOSINOPHIL NFR BLD AUTO: 3.8 % (ref 0.3–6.2)
ERYTHROCYTE [DISTWIDTH] IN BLOOD BY AUTOMATED COUNT: 12.7 % (ref 12.3–15.4)
GFR SERPL CREATININE-BSD FRML MDRD: 77 ML/MIN/1.73
GLOBULIN UR ELPH-MCNC: 3.3 GM/DL
GLUCOSE BLDC GLUCOMTR-MCNC: 255 MG/DL (ref 70–130)
GLUCOSE BLDC GLUCOMTR-MCNC: 266 MG/DL (ref 70–130)
GLUCOSE BLDC GLUCOMTR-MCNC: 343 MG/DL (ref 70–130)
GLUCOSE BLDC GLUCOMTR-MCNC: 344 MG/DL (ref 70–130)
GLUCOSE SERPL-MCNC: 303 MG/DL (ref 65–99)
HCT VFR BLD AUTO: 42.8 % (ref 34–46.6)
HGB BLD-MCNC: 13.4 G/DL (ref 12–15.9)
IMM GRANULOCYTES # BLD AUTO: 0.04 10*3/MM3 (ref 0–0.05)
IMM GRANULOCYTES NFR BLD AUTO: 0.4 % (ref 0–0.5)
LYMPHOCYTES # BLD AUTO: 3.61 10*3/MM3 (ref 0.7–3.1)
LYMPHOCYTES NFR BLD AUTO: 33.9 % (ref 19.6–45.3)
MCH RBC QN AUTO: 28.6 PG (ref 26.6–33)
MCHC RBC AUTO-ENTMCNC: 31.3 G/DL (ref 31.5–35.7)
MCV RBC AUTO: 91.5 FL (ref 79–97)
MONOCYTES # BLD AUTO: 0.74 10*3/MM3 (ref 0.1–0.9)
MONOCYTES NFR BLD AUTO: 6.9 % (ref 5–12)
NEUTROPHILS NFR BLD AUTO: 5.81 10*3/MM3 (ref 1.7–7)
NEUTROPHILS NFR BLD AUTO: 54.6 % (ref 42.7–76)
NRBC BLD AUTO-RTO: 0 /100 WBC (ref 0–0.2)
PLATELET # BLD AUTO: 268 10*3/MM3 (ref 140–450)
PMV BLD AUTO: 8.6 FL (ref 6–12)
POTASSIUM SERPL-SCNC: 4.1 MMOL/L (ref 3.5–5.2)
PROT SERPL-MCNC: 6.8 G/DL (ref 6–8.5)
QT INTERVAL: 376 MS
QT INTERVAL: 414 MS
QT INTERVAL: 422 MS
QTC INTERVAL: 439 MS
QTC INTERVAL: 474 MS
QTC INTERVAL: 481 MS
RBC # BLD AUTO: 4.68 10*6/MM3 (ref 3.77–5.28)
SODIUM SERPL-SCNC: 137 MMOL/L (ref 136–145)
WBC NRBC COR # BLD: 10.65 10*3/MM3 (ref 3.4–10.8)

## 2021-12-16 PROCEDURE — 63710000001 INSULIN DETEMIR PER 5 UNITS: Performed by: PHYSICIAN ASSISTANT

## 2021-12-16 PROCEDURE — 25010000002 ENOXAPARIN PER 10 MG: Performed by: PHYSICIAN ASSISTANT

## 2021-12-16 PROCEDURE — 63710000001 INSULIN ASPART PER 5 UNITS: Performed by: PHYSICIAN ASSISTANT

## 2021-12-16 PROCEDURE — 97166 OT EVAL MOD COMPLEX 45 MIN: CPT

## 2021-12-16 PROCEDURE — 63710000001 INSULIN ASPART PER 5 UNITS: Performed by: INTERNAL MEDICINE

## 2021-12-16 PROCEDURE — 99233 SBSQ HOSP IP/OBS HIGH 50: CPT | Performed by: PHYSICIAN ASSISTANT

## 2021-12-16 PROCEDURE — 93005 ELECTROCARDIOGRAM TRACING: CPT | Performed by: INTERNAL MEDICINE

## 2021-12-16 PROCEDURE — 93010 ELECTROCARDIOGRAM REPORT: CPT | Performed by: INTERNAL MEDICINE

## 2021-12-16 PROCEDURE — 94799 UNLISTED PULMONARY SVC/PX: CPT

## 2021-12-16 PROCEDURE — 63710000001 INSULIN DETEMIR PER 5 UNITS: Performed by: INTERNAL MEDICINE

## 2021-12-16 PROCEDURE — 25010000002 CEFTRIAXONE PER 250 MG: Performed by: PHYSICIAN ASSISTANT

## 2021-12-16 PROCEDURE — 80053 COMPREHEN METABOLIC PANEL: CPT | Performed by: PHYSICIAN ASSISTANT

## 2021-12-16 PROCEDURE — 82962 GLUCOSE BLOOD TEST: CPT

## 2021-12-16 PROCEDURE — 85025 COMPLETE CBC W/AUTO DIFF WBC: CPT | Performed by: PHYSICIAN ASSISTANT

## 2021-12-16 RX ORDER — AMLODIPINE BESYLATE 5 MG/1
2.5 TABLET ORAL ONCE
Status: COMPLETED | OUTPATIENT
Start: 2021-12-16 | End: 2021-12-16

## 2021-12-16 RX ORDER — DULOXETIN HYDROCHLORIDE 30 MG/1
30 CAPSULE, DELAYED RELEASE ORAL DAILY
Status: DISCONTINUED | OUTPATIENT
Start: 2021-12-16 | End: 2021-12-18 | Stop reason: HOSPADM

## 2021-12-16 RX ORDER — HYDROXYZINE HYDROCHLORIDE 25 MG/1
25 TABLET, FILM COATED ORAL 3 TIMES DAILY PRN
Status: DISCONTINUED | OUTPATIENT
Start: 2021-12-16 | End: 2021-12-18 | Stop reason: HOSPADM

## 2021-12-16 RX ORDER — AMLODIPINE BESYLATE 5 MG/1
5 TABLET ORAL DAILY
Status: DISCONTINUED | OUTPATIENT
Start: 2021-12-17 | End: 2021-12-18 | Stop reason: HOSPADM

## 2021-12-16 RX ADMIN — SOTALOL HYDROCHLORIDE 80 MG: 80 TABLET ORAL at 20:14

## 2021-12-16 RX ADMIN — DONEPEZIL HYDROCHLORIDE 5 MG: 5 TABLET, FILM COATED ORAL at 09:36

## 2021-12-16 RX ADMIN — CEFTRIAXONE 1 G: 1 INJECTION, POWDER, FOR SOLUTION INTRAMUSCULAR; INTRAVENOUS at 12:21

## 2021-12-16 RX ADMIN — HYDROXYZINE HYDROCHLORIDE 25 MG: 25 TABLET ORAL at 17:15

## 2021-12-16 RX ADMIN — ASPIRIN 325 MG: 325 TABLET ORAL at 09:37

## 2021-12-16 RX ADMIN — CARBIDOPA AND LEVODOPA 5 MG: 50; 200 TABLET, EXTENDED RELEASE ORAL at 17:15

## 2021-12-16 RX ADMIN — ATORVASTATIN CALCIUM 80 MG: 40 TABLET, FILM COATED ORAL at 20:14

## 2021-12-16 RX ADMIN — IPRATROPIUM BROMIDE AND ALBUTEROL SULFATE 3 ML: .5; 2.5 SOLUTION RESPIRATORY (INHALATION) at 13:23

## 2021-12-16 RX ADMIN — INSULIN ASPART 14 UNITS: 100 INJECTION, SOLUTION INTRAVENOUS; SUBCUTANEOUS at 17:15

## 2021-12-16 RX ADMIN — DULOXETINE HYDROCHLORIDE 30 MG: 30 CAPSULE, DELAYED RELEASE ORAL at 20:15

## 2021-12-16 RX ADMIN — ENOXAPARIN SODIUM 40 MG: 40 INJECTION SUBCUTANEOUS at 20:15

## 2021-12-16 RX ADMIN — PANTOPRAZOLE SODIUM 40 MG: 40 TABLET, DELAYED RELEASE ORAL at 04:41

## 2021-12-16 RX ADMIN — INSULIN ASPART 14 UNITS: 100 INJECTION, SOLUTION INTRAVENOUS; SUBCUTANEOUS at 09:35

## 2021-12-16 RX ADMIN — INSULIN ASPART 5 UNITS: 100 INJECTION, SOLUTION INTRAVENOUS; SUBCUTANEOUS at 09:37

## 2021-12-16 RX ADMIN — AMLODIPINE BESYLATE 2.5 MG: 5 TABLET ORAL at 09:36

## 2021-12-16 RX ADMIN — IPRATROPIUM BROMIDE AND ALBUTEROL SULFATE 3 ML: .5; 2.5 SOLUTION RESPIRATORY (INHALATION) at 07:21

## 2021-12-16 RX ADMIN — SODIUM CHLORIDE, PRESERVATIVE FREE 10 ML: 5 INJECTION INTRAVENOUS at 09:38

## 2021-12-16 RX ADMIN — TRIAMTERENE AND HYDROCHLOROTHIAZIDE 1 TABLET: 37.5; 25 TABLET ORAL at 09:35

## 2021-12-16 RX ADMIN — INSULIN DETEMIR 40 UNITS: 100 INJECTION, SOLUTION SUBCUTANEOUS at 20:15

## 2021-12-16 RX ADMIN — INSULIN ASPART 14 UNITS: 100 INJECTION, SOLUTION INTRAVENOUS; SUBCUTANEOUS at 12:22

## 2021-12-16 RX ADMIN — MONTELUKAST SODIUM 10 MG: 10 TABLET, COATED ORAL at 20:14

## 2021-12-16 RX ADMIN — INSULIN ASPART 4 UNITS: 100 INJECTION, SOLUTION INTRAVENOUS; SUBCUTANEOUS at 17:16

## 2021-12-16 RX ADMIN — POTASSIUM CHLORIDE 10 MEQ: 750 TABLET, FILM COATED, EXTENDED RELEASE ORAL at 09:37

## 2021-12-16 RX ADMIN — FUROSEMIDE 20 MG: 20 TABLET ORAL at 09:36

## 2021-12-16 RX ADMIN — INSULIN DETEMIR 35 UNITS: 100 INJECTION, SOLUTION SUBCUTANEOUS at 10:21

## 2021-12-16 RX ADMIN — BUDESONIDE 0.5 MG: 0.5 INHALANT RESPIRATORY (INHALATION) at 07:22

## 2021-12-16 RX ADMIN — INSULIN ASPART 7 UNITS: 100 INJECTION, SOLUTION INTRAVENOUS; SUBCUTANEOUS at 12:22

## 2021-12-16 RX ADMIN — SODIUM CHLORIDE, PRESERVATIVE FREE 10 ML: 5 INJECTION INTRAVENOUS at 20:14

## 2021-12-16 RX ADMIN — BUDESONIDE 0.5 MG: 0.5 INHALANT RESPIRATORY (INHALATION) at 18:45

## 2021-12-16 RX ADMIN — CARBIDOPA AND LEVODOPA 5 MG: 50; 200 TABLET, EXTENDED RELEASE ORAL at 09:36

## 2021-12-16 RX ADMIN — AMLODIPINE BESYLATE 2.5 MG: 5 TABLET ORAL at 12:22

## 2021-12-16 RX ADMIN — IPRATROPIUM BROMIDE AND ALBUTEROL SULFATE 3 ML: .5; 2.5 SOLUTION RESPIRATORY (INHALATION) at 18:45

## 2021-12-17 LAB
ANION GAP SERPL CALCULATED.3IONS-SCNC: 11.4 MMOL/L (ref 5–15)
BACTERIA SPEC AEROBE CULT: ABNORMAL
BASOPHILS # BLD AUTO: 0.04 10*3/MM3 (ref 0–0.2)
BASOPHILS NFR BLD AUTO: 0.3 % (ref 0–1.5)
BUN SERPL-MCNC: 14 MG/DL (ref 8–23)
BUN/CREAT SERPL: 19.7 (ref 7–25)
CALCIUM SPEC-SCNC: 9.4 MG/DL (ref 8.6–10.5)
CHLORIDE SERPL-SCNC: 95 MMOL/L (ref 98–107)
CO2 SERPL-SCNC: 26.6 MMOL/L (ref 22–29)
CREAT SERPL-MCNC: 0.71 MG/DL (ref 0.57–1)
DEPRECATED RDW RBC AUTO: 41.5 FL (ref 37–54)
EOSINOPHIL # BLD AUTO: 0.38 10*3/MM3 (ref 0–0.4)
EOSINOPHIL NFR BLD AUTO: 3.1 % (ref 0.3–6.2)
ERYTHROCYTE [DISTWIDTH] IN BLOOD BY AUTOMATED COUNT: 12.5 % (ref 12.3–15.4)
GFR SERPL CREATININE-BSD FRML MDRD: 81 ML/MIN/1.73
GLUCOSE BLDC GLUCOMTR-MCNC: 268 MG/DL (ref 70–130)
GLUCOSE BLDC GLUCOMTR-MCNC: 274 MG/DL (ref 70–130)
GLUCOSE BLDC GLUCOMTR-MCNC: 295 MG/DL (ref 70–130)
GLUCOSE BLDC GLUCOMTR-MCNC: 308 MG/DL (ref 70–130)
GLUCOSE SERPL-MCNC: 287 MG/DL (ref 65–99)
HCT VFR BLD AUTO: 43.7 % (ref 34–46.6)
HGB BLD-MCNC: 13.7 G/DL (ref 12–15.9)
IMM GRANULOCYTES # BLD AUTO: 0.05 10*3/MM3 (ref 0–0.05)
IMM GRANULOCYTES NFR BLD AUTO: 0.4 % (ref 0–0.5)
LYMPHOCYTES # BLD AUTO: 3.68 10*3/MM3 (ref 0.7–3.1)
LYMPHOCYTES NFR BLD AUTO: 30.2 % (ref 19.6–45.3)
MCH RBC QN AUTO: 28.5 PG (ref 26.6–33)
MCHC RBC AUTO-ENTMCNC: 31.4 G/DL (ref 31.5–35.7)
MCV RBC AUTO: 91 FL (ref 79–97)
MONOCYTES # BLD AUTO: 0.73 10*3/MM3 (ref 0.1–0.9)
MONOCYTES NFR BLD AUTO: 6 % (ref 5–12)
NEUTROPHILS NFR BLD AUTO: 60 % (ref 42.7–76)
NEUTROPHILS NFR BLD AUTO: 7.31 10*3/MM3 (ref 1.7–7)
NRBC BLD AUTO-RTO: 0 /100 WBC (ref 0–0.2)
PLATELET # BLD AUTO: 277 10*3/MM3 (ref 140–450)
PMV BLD AUTO: 8.6 FL (ref 6–12)
POTASSIUM SERPL-SCNC: 4 MMOL/L (ref 3.5–5.2)
RBC # BLD AUTO: 4.8 10*6/MM3 (ref 3.77–5.28)
SODIUM SERPL-SCNC: 133 MMOL/L (ref 136–145)
WBC NRBC COR # BLD: 12.19 10*3/MM3 (ref 3.4–10.8)

## 2021-12-17 PROCEDURE — 25010000002 CEFTRIAXONE PER 250 MG: Performed by: PHYSICIAN ASSISTANT

## 2021-12-17 PROCEDURE — 82962 GLUCOSE BLOOD TEST: CPT

## 2021-12-17 PROCEDURE — 94799 UNLISTED PULMONARY SVC/PX: CPT

## 2021-12-17 PROCEDURE — 80048 BASIC METABOLIC PNL TOTAL CA: CPT | Performed by: PHYSICIAN ASSISTANT

## 2021-12-17 PROCEDURE — 99239 HOSP IP/OBS DSCHRG MGMT >30: CPT | Performed by: PHYSICIAN ASSISTANT

## 2021-12-17 PROCEDURE — 85025 COMPLETE CBC W/AUTO DIFF WBC: CPT | Performed by: PHYSICIAN ASSISTANT

## 2021-12-17 PROCEDURE — 63710000001 INSULIN DETEMIR PER 5 UNITS: Performed by: PHYSICIAN ASSISTANT

## 2021-12-17 PROCEDURE — 25010000002 ENOXAPARIN PER 10 MG: Performed by: PHYSICIAN ASSISTANT

## 2021-12-17 PROCEDURE — 63710000001 INSULIN ASPART PER 5 UNITS: Performed by: INTERNAL MEDICINE

## 2021-12-17 PROCEDURE — 63710000001 INSULIN ASPART PER 5 UNITS: Performed by: PHYSICIAN ASSISTANT

## 2021-12-17 RX ORDER — AMLODIPINE BESYLATE 5 MG/1
5 TABLET ORAL DAILY
Start: 2021-12-18

## 2021-12-17 RX ORDER — ATORVASTATIN CALCIUM 20 MG/1
20 TABLET, FILM COATED ORAL NIGHTLY
Start: 2021-12-17 | End: 2021-12-17

## 2021-12-17 RX ORDER — DULOXETIN HYDROCHLORIDE 30 MG/1
30 CAPSULE, DELAYED RELEASE ORAL DAILY
Start: 2021-12-18

## 2021-12-17 RX ORDER — EZETIMIBE 10 MG/1
10 TABLET ORAL DAILY
Start: 2021-12-17

## 2021-12-17 RX ORDER — ATORVASTATIN CALCIUM 40 MG/1
40 TABLET, FILM COATED ORAL NIGHTLY
Start: 2021-12-17

## 2021-12-17 RX ORDER — ASPIRIN 325 MG
325 TABLET ORAL DAILY
Qty: 30 TABLET | Refills: 0
Start: 2021-12-18

## 2021-12-17 RX ORDER — CEFDINIR 300 MG/1
300 CAPSULE ORAL 2 TIMES DAILY
Qty: 8 CAPSULE | Refills: 0 | Status: SHIPPED | OUTPATIENT
Start: 2021-12-18 | End: 2022-08-24

## 2021-12-17 RX ADMIN — INSULIN ASPART 14 UNITS: 100 INJECTION, SOLUTION INTRAVENOUS; SUBCUTANEOUS at 16:45

## 2021-12-17 RX ADMIN — INSULIN DETEMIR 40 UNITS: 100 INJECTION, SOLUTION SUBCUTANEOUS at 19:43

## 2021-12-17 RX ADMIN — ASPIRIN 325 MG: 325 TABLET ORAL at 08:52

## 2021-12-17 RX ADMIN — CARBIDOPA AND LEVODOPA 5 MG: 50; 200 TABLET, EXTENDED RELEASE ORAL at 08:52

## 2021-12-17 RX ADMIN — MONTELUKAST SODIUM 10 MG: 10 TABLET, COATED ORAL at 19:38

## 2021-12-17 RX ADMIN — INSULIN ASPART 6 UNITS: 100 INJECTION, SOLUTION INTRAVENOUS; SUBCUTANEOUS at 08:53

## 2021-12-17 RX ADMIN — TRIAMTERENE AND HYDROCHLOROTHIAZIDE 1 TABLET: 37.5; 25 TABLET ORAL at 08:52

## 2021-12-17 RX ADMIN — SODIUM CHLORIDE, PRESERVATIVE FREE 10 ML: 5 INJECTION INTRAVENOUS at 08:53

## 2021-12-17 RX ADMIN — IPRATROPIUM BROMIDE AND ALBUTEROL SULFATE 3 ML: .5; 2.5 SOLUTION RESPIRATORY (INHALATION) at 07:06

## 2021-12-17 RX ADMIN — BUDESONIDE 0.5 MG: 0.5 INHALANT RESPIRATORY (INHALATION) at 07:06

## 2021-12-17 RX ADMIN — AMLODIPINE BESYLATE 5 MG: 5 TABLET ORAL at 08:52

## 2021-12-17 RX ADMIN — ATORVASTATIN CALCIUM 80 MG: 40 TABLET, FILM COATED ORAL at 19:37

## 2021-12-17 RX ADMIN — IPRATROPIUM BROMIDE AND ALBUTEROL SULFATE 3 ML: .5; 2.5 SOLUTION RESPIRATORY (INHALATION) at 13:21

## 2021-12-17 RX ADMIN — CEFTRIAXONE 1 G: 1 INJECTION, POWDER, FOR SOLUTION INTRAMUSCULAR; INTRAVENOUS at 11:51

## 2021-12-17 RX ADMIN — IPRATROPIUM BROMIDE AND ALBUTEROL SULFATE 3 ML: .5; 2.5 SOLUTION RESPIRATORY (INHALATION) at 18:25

## 2021-12-17 RX ADMIN — CARBIDOPA AND LEVODOPA 5 MG: 50; 200 TABLET, EXTENDED RELEASE ORAL at 16:45

## 2021-12-17 RX ADMIN — DONEPEZIL HYDROCHLORIDE 5 MG: 5 TABLET, FILM COATED ORAL at 08:52

## 2021-12-17 RX ADMIN — DULOXETINE HYDROCHLORIDE 30 MG: 30 CAPSULE, DELAYED RELEASE ORAL at 08:52

## 2021-12-17 RX ADMIN — ENOXAPARIN SODIUM 40 MG: 40 INJECTION SUBCUTANEOUS at 08:52

## 2021-12-17 RX ADMIN — BUDESONIDE 0.5 MG: 0.5 INHALANT RESPIRATORY (INHALATION) at 18:25

## 2021-12-17 RX ADMIN — SOTALOL HYDROCHLORIDE 80 MG: 80 TABLET ORAL at 19:37

## 2021-12-17 RX ADMIN — POTASSIUM CHLORIDE 10 MEQ: 750 TABLET, FILM COATED, EXTENDED RELEASE ORAL at 08:52

## 2021-12-17 RX ADMIN — INSULIN ASPART 14 UNITS: 100 INJECTION, SOLUTION INTRAVENOUS; SUBCUTANEOUS at 11:50

## 2021-12-17 RX ADMIN — INSULIN DETEMIR 40 UNITS: 100 INJECTION, SOLUTION SUBCUTANEOUS at 08:54

## 2021-12-17 RX ADMIN — INSULIN ASPART 14 UNITS: 100 INJECTION, SOLUTION INTRAVENOUS; SUBCUTANEOUS at 08:51

## 2021-12-17 RX ADMIN — INSULIN ASPART 7 UNITS: 100 INJECTION, SOLUTION INTRAVENOUS; SUBCUTANEOUS at 11:51

## 2021-12-17 RX ADMIN — ENOXAPARIN SODIUM 40 MG: 40 INJECTION SUBCUTANEOUS at 19:38

## 2021-12-17 RX ADMIN — INSULIN ASPART 6 UNITS: 100 INJECTION, SOLUTION INTRAVENOUS; SUBCUTANEOUS at 16:45

## 2021-12-17 RX ADMIN — SOTALOL HYDROCHLORIDE 80 MG: 80 TABLET ORAL at 08:52

## 2021-12-18 VITALS
HEART RATE: 71 BPM | BODY MASS INDEX: 48.73 KG/M2 | OXYGEN SATURATION: 95 % | SYSTOLIC BLOOD PRESSURE: 121 MMHG | TEMPERATURE: 98.4 F | DIASTOLIC BLOOD PRESSURE: 56 MMHG | WEIGHT: 275 LBS | HEIGHT: 63 IN | RESPIRATION RATE: 18 BRPM

## 2021-12-18 PROCEDURE — 94799 UNLISTED PULMONARY SVC/PX: CPT

## 2021-12-18 RX ADMIN — IPRATROPIUM BROMIDE AND ALBUTEROL SULFATE 3 ML: .5; 2.5 SOLUTION RESPIRATORY (INHALATION) at 01:03

## 2021-12-19 LAB
BACTERIA SPEC AEROBE CULT: NORMAL
BACTERIA SPEC AEROBE CULT: NORMAL

## 2021-12-21 LAB — GLUCOSE BLDC GLUCOMTR-MCNC: 386 MG/DL (ref 70–130)

## 2022-01-12 ENCOUNTER — LAB REQUISITION (OUTPATIENT)
Dept: LAB | Facility: HOSPITAL | Age: 72
End: 2022-01-12

## 2022-01-12 DIAGNOSIS — Z20.828 CONTACT WITH AND (SUSPECTED) EXPOSURE TO OTHER VIRAL COMMUNICABLE DISEASES: ICD-10-CM

## 2022-01-12 PROCEDURE — 0202U NFCT DS 22 TRGT SARS-COV-2: CPT | Performed by: NURSE PRACTITIONER

## 2022-01-14 ENCOUNTER — LAB REQUISITION (OUTPATIENT)
Dept: LAB | Facility: HOSPITAL | Age: 72
End: 2022-01-14

## 2022-01-14 DIAGNOSIS — I10 ESSENTIAL (PRIMARY) HYPERTENSION: ICD-10-CM

## 2022-01-14 LAB
ANION GAP SERPL CALCULATED.3IONS-SCNC: 11.4 MMOL/L (ref 5–15)
BUN SERPL-MCNC: 16 MG/DL (ref 8–23)
BUN/CREAT SERPL: 20.3 (ref 7–25)
CALCIUM SPEC-SCNC: 9.4 MG/DL (ref 8.6–10.5)
CHLORIDE SERPL-SCNC: 96 MMOL/L (ref 98–107)
CHOLEST SERPL-MCNC: 153 MG/DL (ref 0–200)
CO2 SERPL-SCNC: 28.6 MMOL/L (ref 22–29)
CREAT SERPL-MCNC: 0.79 MG/DL (ref 0.57–1)
GFR SERPL CREATININE-BSD FRML MDRD: 71 ML/MIN/1.73
GLUCOSE SERPL-MCNC: 209 MG/DL (ref 65–99)
HBA1C MFR BLD: 11.1 % (ref 4.8–5.6)
HDLC SERPL-MCNC: 40 MG/DL (ref 40–60)
LDLC SERPL CALC-MCNC: 88 MG/DL (ref 0–100)
LDLC/HDLC SERPL: 2.12 {RATIO}
POTASSIUM SERPL-SCNC: 6.1 MMOL/L (ref 3.5–5.2)
SODIUM SERPL-SCNC: 136 MMOL/L (ref 136–145)
TRIGL SERPL-MCNC: 141 MG/DL (ref 0–150)
VLDLC SERPL-MCNC: 25 MG/DL (ref 5–40)

## 2022-01-14 PROCEDURE — 82306 VITAMIN D 25 HYDROXY: CPT | Performed by: NURSE PRACTITIONER

## 2022-01-14 PROCEDURE — 80061 LIPID PANEL: CPT | Performed by: NURSE PRACTITIONER

## 2022-01-14 PROCEDURE — 83036 HEMOGLOBIN GLYCOSYLATED A1C: CPT | Performed by: NURSE PRACTITIONER

## 2022-01-14 PROCEDURE — 82607 VITAMIN B-12: CPT | Performed by: NURSE PRACTITIONER

## 2022-01-14 PROCEDURE — 80048 BASIC METABOLIC PNL TOTAL CA: CPT | Performed by: NURSE PRACTITIONER

## 2022-01-15 LAB
25(OH)D3 SERPL-MCNC: 27.8 NG/ML (ref 30–100)
VIT B12 BLD-MCNC: 461 PG/ML (ref 211–946)

## 2022-01-18 ENCOUNTER — LAB REQUISITION (OUTPATIENT)
Dept: LAB | Facility: HOSPITAL | Age: 72
End: 2022-01-18

## 2022-01-18 DIAGNOSIS — I10 ESSENTIAL (PRIMARY) HYPERTENSION: ICD-10-CM

## 2022-01-18 LAB
ANION GAP SERPL CALCULATED.3IONS-SCNC: 10.7 MMOL/L (ref 5–15)
BUN SERPL-MCNC: 21 MG/DL (ref 8–23)
BUN/CREAT SERPL: 28.4 (ref 7–25)
CALCIUM SPEC-SCNC: 8.9 MG/DL (ref 8.6–10.5)
CHLORIDE SERPL-SCNC: 100 MMOL/L (ref 98–107)
CO2 SERPL-SCNC: 26.3 MMOL/L (ref 22–29)
CREAT SERPL-MCNC: 0.74 MG/DL (ref 0.57–1)
DEPRECATED RDW RBC AUTO: 42.5 FL (ref 37–54)
ERYTHROCYTE [DISTWIDTH] IN BLOOD BY AUTOMATED COUNT: 12.7 % (ref 12.3–15.4)
GFR SERPL CREATININE-BSD FRML MDRD: 77 ML/MIN/1.73
GLUCOSE SERPL-MCNC: 386 MG/DL (ref 65–99)
HCT VFR BLD AUTO: 40.8 % (ref 34–46.6)
HGB BLD-MCNC: 13 G/DL (ref 12–15.9)
MCH RBC QN AUTO: 28.9 PG (ref 26.6–33)
MCHC RBC AUTO-ENTMCNC: 31.9 G/DL (ref 31.5–35.7)
MCV RBC AUTO: 90.7 FL (ref 79–97)
PLATELET # BLD AUTO: 264 10*3/MM3 (ref 140–450)
PMV BLD AUTO: 8.8 FL (ref 6–12)
POTASSIUM SERPL-SCNC: 4.5 MMOL/L (ref 3.5–5.2)
RBC # BLD AUTO: 4.5 10*6/MM3 (ref 3.77–5.28)
SODIUM SERPL-SCNC: 137 MMOL/L (ref 136–145)
WBC NRBC COR # BLD: 12.64 10*3/MM3 (ref 3.4–10.8)

## 2022-01-18 PROCEDURE — 80048 BASIC METABOLIC PNL TOTAL CA: CPT | Performed by: NURSE PRACTITIONER

## 2022-01-18 PROCEDURE — 85027 COMPLETE CBC AUTOMATED: CPT | Performed by: NURSE PRACTITIONER

## 2022-01-24 ENCOUNTER — LAB REQUISITION (OUTPATIENT)
Dept: LAB | Facility: HOSPITAL | Age: 72
End: 2022-01-24

## 2022-01-24 DIAGNOSIS — I10 ESSENTIAL (PRIMARY) HYPERTENSION: ICD-10-CM

## 2022-01-24 DIAGNOSIS — E11.9 TYPE 2 DIABETES MELLITUS WITHOUT COMPLICATIONS: ICD-10-CM

## 2022-01-24 LAB
BASOPHILS # BLD AUTO: 0.06 10*3/MM3 (ref 0–0.2)
BASOPHILS NFR BLD AUTO: 0.5 % (ref 0–1.5)
DEPRECATED RDW RBC AUTO: 40.3 FL (ref 37–54)
EOSINOPHIL # BLD AUTO: 0.3 10*3/MM3 (ref 0–0.4)
EOSINOPHIL NFR BLD AUTO: 2.5 % (ref 0.3–6.2)
ERYTHROCYTE [DISTWIDTH] IN BLOOD BY AUTOMATED COUNT: 12.3 % (ref 12.3–15.4)
HCT VFR BLD AUTO: 42 % (ref 34–46.6)
HGB BLD-MCNC: 13.6 G/DL (ref 12–15.9)
IMM GRANULOCYTES # BLD AUTO: 0.04 10*3/MM3 (ref 0–0.05)
IMM GRANULOCYTES NFR BLD AUTO: 0.3 % (ref 0–0.5)
LYMPHOCYTES # BLD AUTO: 4.32 10*3/MM3 (ref 0.7–3.1)
LYMPHOCYTES NFR BLD AUTO: 35.4 % (ref 19.6–45.3)
MCH RBC QN AUTO: 28.9 PG (ref 26.6–33)
MCHC RBC AUTO-ENTMCNC: 32.4 G/DL (ref 31.5–35.7)
MCV RBC AUTO: 89.4 FL (ref 79–97)
MONOCYTES # BLD AUTO: 0.69 10*3/MM3 (ref 0.1–0.9)
MONOCYTES NFR BLD AUTO: 5.6 % (ref 5–12)
NEUTROPHILS NFR BLD AUTO: 55.7 % (ref 42.7–76)
NEUTROPHILS NFR BLD AUTO: 6.81 10*3/MM3 (ref 1.7–7)
NRBC BLD AUTO-RTO: 0 /100 WBC (ref 0–0.2)
PLATELET # BLD AUTO: 322 10*3/MM3 (ref 140–450)
PMV BLD AUTO: 8.6 FL (ref 6–12)
RBC # BLD AUTO: 4.7 10*6/MM3 (ref 3.77–5.28)
WBC NRBC COR # BLD: 12.22 10*3/MM3 (ref 3.4–10.8)

## 2022-01-24 PROCEDURE — 85025 COMPLETE CBC W/AUTO DIFF WBC: CPT | Performed by: NURSE PRACTITIONER

## 2022-03-15 ENCOUNTER — LAB REQUISITION (OUTPATIENT)
Dept: LAB | Facility: HOSPITAL | Age: 72
End: 2022-03-15

## 2022-03-15 DIAGNOSIS — I10 ESSENTIAL (PRIMARY) HYPERTENSION: ICD-10-CM

## 2022-03-15 LAB
ALBUMIN SERPL-MCNC: 4.16 G/DL (ref 3.5–5.2)
ALBUMIN/GLOB SERPL: 1.3 G/DL
ALP SERPL-CCNC: 174 U/L (ref 39–117)
ALT SERPL W P-5'-P-CCNC: 24 U/L (ref 1–33)
ANION GAP SERPL CALCULATED.3IONS-SCNC: 12.5 MMOL/L (ref 5–15)
AST SERPL-CCNC: 24 U/L (ref 1–32)
BILIRUB SERPL-MCNC: 0.6 MG/DL (ref 0–1.2)
BUN SERPL-MCNC: 21 MG/DL (ref 8–23)
BUN/CREAT SERPL: 24.7 (ref 7–25)
CALCIUM SPEC-SCNC: 9.8 MG/DL (ref 8.6–10.5)
CHLORIDE SERPL-SCNC: 94 MMOL/L (ref 98–107)
CO2 SERPL-SCNC: 29.5 MMOL/L (ref 22–29)
CREAT SERPL-MCNC: 0.85 MG/DL (ref 0.57–1)
EGFRCR SERPLBLD CKD-EPI 2021: 72 ML/MIN/1.73
GLOBULIN UR ELPH-MCNC: 3.1 GM/DL
GLUCOSE SERPL-MCNC: 241 MG/DL (ref 65–99)
HBA1C MFR BLD: 11.5 % (ref 4.8–5.6)
POTASSIUM SERPL-SCNC: 4.5 MMOL/L (ref 3.5–5.2)
PROT SERPL-MCNC: 7.3 G/DL (ref 6–8.5)
SODIUM SERPL-SCNC: 136 MMOL/L (ref 136–145)

## 2022-03-15 PROCEDURE — 80053 COMPREHEN METABOLIC PANEL: CPT | Performed by: INTERNAL MEDICINE

## 2022-03-15 PROCEDURE — 83036 HEMOGLOBIN GLYCOSYLATED A1C: CPT | Performed by: INTERNAL MEDICINE

## 2022-06-15 ENCOUNTER — APPOINTMENT (OUTPATIENT)
Dept: CT IMAGING | Facility: HOSPITAL | Age: 72
End: 2022-06-15

## 2022-06-15 ENCOUNTER — HOSPITAL ENCOUNTER (EMERGENCY)
Facility: HOSPITAL | Age: 72
Discharge: HOME | End: 2022-06-15
Attending: EMERGENCY MEDICINE | Admitting: EMERGENCY MEDICINE

## 2022-06-15 VITALS
WEIGHT: 255 LBS | HEART RATE: 72 BPM | DIASTOLIC BLOOD PRESSURE: 79 MMHG | OXYGEN SATURATION: 100 % | TEMPERATURE: 98 F | HEIGHT: 63 IN | BODY MASS INDEX: 45.18 KG/M2 | SYSTOLIC BLOOD PRESSURE: 145 MMHG | RESPIRATION RATE: 18 BRPM
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
## 2022-06-15 DIAGNOSIS — T14.8XXA ABRASION: ICD-10-CM

## 2022-06-15 DIAGNOSIS — M54.9 ACUTE BACK PAIN, UNSPECIFIED BACK LOCATION, UNSPECIFIED BACK PAIN LATERALITY: ICD-10-CM

## 2022-06-15 DIAGNOSIS — R51.9 NONINTRACTABLE HEADACHE, UNSPECIFIED CHRONICITY PATTERN, UNSPECIFIED HEADACHE TYPE: ICD-10-CM

## 2022-06-15 PROCEDURE — 70450 CT HEAD/BRAIN W/O DYE: CPT

## 2022-06-15 PROCEDURE — 72125 CT NECK SPINE W/O DYE: CPT | Performed by: RADIOLOGY

## 2022-06-15 PROCEDURE — 72131 CT LUMBAR SPINE W/O DYE: CPT

## 2022-06-15 PROCEDURE — 72125 CT NECK SPINE W/O DYE: CPT

## 2022-06-15 PROCEDURE — 72131 CT LUMBAR SPINE W/O DYE: CPT | Performed by: RADIOLOGY

## 2022-06-15 PROCEDURE — 72128 CT CHEST SPINE W/O DYE: CPT

## 2022-06-15 PROCEDURE — 99283 EMERGENCY DEPT VISIT LOW MDM: CPT

## 2022-06-15 PROCEDURE — 70450 CT HEAD/BRAIN W/O DYE: CPT | Performed by: RADIOLOGY

## 2022-06-15 PROCEDURE — 72128 CT CHEST SPINE W/O DYE: CPT | Performed by: RADIOLOGY

## 2022-06-15 NOTE — ED PROVIDER NOTES
Subjective   71 yo female patient presents to the ED with complaints of back pain and headache. Patient took a fall at home. Pt states that she fell out of the bed. She did hit her head. She reports unknown LOC. Fall occurred today. No worsening or alleviating factors.        History provided by:  Patient   used: No        Review of Systems   Constitutional: Negative.    Eyes: Negative.    Respiratory: Negative.    Cardiovascular: Negative.    Gastrointestinal: Negative.    Endocrine: Negative.    Genitourinary: Negative.    Musculoskeletal: Positive for back pain.   Skin: Negative.    Allergic/Immunologic: Negative.    Neurological: Positive for headaches.   Hematological: Negative.    Psychiatric/Behavioral: Negative.    All other systems reviewed and are negative.      Past Medical History:   Diagnosis Date   • COPD (chronic obstructive pulmonary disease) (HCC)    • Dementia (HCC)    • Diverticulosis    • Essential hypertension    • GERD (gastroesophageal reflux disease)    • Osteoporosis    • Paroxysmal atrial fibrillation (HCC)    • Type II diabetes mellitus (HCC)        No Known Allergies    Past Surgical History:   Procedure Laterality Date   • APPENDECTOMY     • CHOLECYSTECTOMY     • TONSILLECTOMY         Family History   Problem Relation Age of Onset   • Cerebral aneurysm Mother    • Heart failure Father    • Diabetes Father        Social History     Socioeconomic History   • Marital status:    Tobacco Use   • Smoking status: Never Smoker   • Smokeless tobacco: Never Used   Substance and Sexual Activity   • Alcohol use: Never   • Drug use: Never   • Sexual activity: Defer           Objective   Physical Exam  Vitals and nursing note reviewed.   Constitutional:       Appearance: Normal appearance. She is normal weight.   HENT:      Head: Normocephalic and atraumatic.      Right Ear: External ear normal.      Left Ear: External ear normal.      Nose: Nose normal.      Mouth/Throat:       Mouth: Mucous membranes are moist.      Pharynx: Oropharynx is clear.   Eyes:      Extraocular Movements: Extraocular movements intact.      Conjunctiva/sclera: Conjunctivae normal.      Pupils: Pupils are equal, round, and reactive to light.   Cardiovascular:      Rate and Rhythm: Normal rate and regular rhythm.      Pulses: Normal pulses.      Heart sounds: Normal heart sounds.   Pulmonary:      Effort: Pulmonary effort is normal.      Breath sounds: Normal breath sounds.   Abdominal:      General: Abdomen is flat. Bowel sounds are normal.      Palpations: Abdomen is soft.   Musculoskeletal:         General: Normal range of motion.        Arms:       Cervical back: Normal range of motion and neck supple.   Skin:     General: Skin is warm and dry.      Capillary Refill: Capillary refill takes less than 2 seconds.   Neurological:      General: No focal deficit present.      Mental Status: She is alert and oriented to person, place, and time. Mental status is at baseline.      Comments: GCS 15     Psychiatric:         Mood and Affect: Mood normal.         Behavior: Behavior normal.         Thought Content: Thought content normal.         Judgment: Judgment normal.         Procedures           ED Course  ED Course as of 06/15/22 1424   Wed Matti 15, 2022   1354 CT Thoracic Spine Without Contrast  IMPRESSION:  No acute fracture or malalignment. Degenerative changes.     This report was finalized on 6/15/2022 1:51 PM by Dr. Jericho Piper MD.             Specimen Collected: 06/15/22 13:49 Last Resulted: 06/15/22 13:51         [ML]   1354 CT Head Without Contrast  IMPRESSION:  1.  No CT evidence of acute intracranial abnormality.  2.  Senescent changes.  3.  Acute on chronic paranasal sinusitis.     This report was finalized on 6/15/2022 1:49 PM by Dr. Jericho Piper MD.             Specimen Collected: 06/15/22 13:48 Last Resulted: 06/15/22 13:49         [ML]   1354 CT Lumbar Spine Without Contrast  IMPRESSION:  1.   Multilevel degenerative changes with mild stenosis.  2.  No acute fracture or traumatic malalignment.     This report was finalized on 6/15/2022 1:48 PM by Dr. Jericho Piper MD.             Specimen Collected: 06/15/22 13:46 Last Resulted: 06/15/22 13:48           [ML]   1355 CT Cervical Spine Without Contrast     IMPRESSION:  1.  No acute fracture or traumatic malalignment.  2.  Multilevel degenerative disc disease C4/5 through C6-7 with central  canal and neural foraminal stenosis and is most severe at the C5/6  level.     This report was finalized on 6/15/2022 1:52 PM by Dr. Jericho Piper MD.             Specimen Collected: 06/15/22 13:51 Last Resulted: 06/15/22 13:52         [ML]      ED Course User Index  [ML] Jeimy Evans PA                                                 MDM  Number of Diagnoses or Management Options     Amount and/or Complexity of Data Reviewed  Tests in the radiology section of CPT®: ordered and reviewed    Risk of Complications, Morbidity, and/or Mortality  Presenting problems: low  Diagnostic procedures: low  Management options: low    Patient Progress  Patient progress: improved      Final diagnoses:   Fall, initial encounter   Nonintractable headache, unspecified chronicity pattern, unspecified headache type   Acute back pain, unspecified back location, unspecified back pain laterality   Abrasion       ED Disposition  ED Disposition     ED Disposition   Discharge    Condition   Stable    Comment   --             Jan Hammonds MD  [address] [facility] [address]  [phone]    Schedule an appointment as soon as possible for a visit in 1 day           Medication List      No changes were made to your prescriptions during this visit.          Jeimy Evans PA  06/15/22 9191

## 2022-07-07 ENCOUNTER — LAB REQUISITION (OUTPATIENT)
Dept: LAB | Facility: HOSPITAL | Age: 72
End: 2022-07-07

## 2022-07-07 DIAGNOSIS — I10 ESSENTIAL (PRIMARY) HYPERTENSION: ICD-10-CM

## 2022-07-07 DIAGNOSIS — D64.9 ANEMIA, UNSPECIFIED: ICD-10-CM

## 2022-07-07 LAB
ALBUMIN SERPL-MCNC: 3.63 G/DL (ref 3.5–5.2)
ALBUMIN/GLOB SERPL: 1.2 G/DL
ALP SERPL-CCNC: 122 U/L (ref 39–117)
ALT SERPL W P-5'-P-CCNC: 22 U/L (ref 1–33)
ANION GAP SERPL CALCULATED.3IONS-SCNC: 14 MMOL/L (ref 5–15)
AST SERPL-CCNC: 24 U/L (ref 1–32)
BASOPHILS # BLD MANUAL: 0.22 10*3/MM3 (ref 0–0.2)
BASOPHILS NFR BLD MANUAL: 2 % (ref 0–1.5)
BILIRUB SERPL-MCNC: 0.6 MG/DL (ref 0–1.2)
BUN SERPL-MCNC: 16 MG/DL (ref 8–23)
BUN/CREAT SERPL: 21.1 (ref 7–25)
CALCIUM SPEC-SCNC: 9.8 MG/DL (ref 8.6–10.5)
CHLORIDE SERPL-SCNC: 97 MMOL/L (ref 98–107)
CO2 SERPL-SCNC: 28 MMOL/L (ref 22–29)
CREAT SERPL-MCNC: 0.76 MG/DL (ref 0.57–1)
DEPRECATED RDW RBC AUTO: 42.5 FL (ref 37–54)
EGFRCR SERPLBLD CKD-EPI 2021: 82.3 ML/MIN/1.73
EOSINOPHIL # BLD MANUAL: 0.45 10*3/MM3 (ref 0–0.4)
EOSINOPHIL NFR BLD MANUAL: 4 % (ref 0.3–6.2)
ERYTHROCYTE [DISTWIDTH] IN BLOOD BY AUTOMATED COUNT: 12.8 % (ref 12.3–15.4)
GLOBULIN UR ELPH-MCNC: 3 GM/DL
GLUCOSE SERPL-MCNC: 198 MG/DL (ref 65–99)
HCT VFR BLD AUTO: 40.6 % (ref 34–46.6)
HGB BLD-MCNC: 13.1 G/DL (ref 12–15.9)
LYMPHOCYTES # BLD MANUAL: 4.45 10*3/MM3 (ref 0.7–3.1)
LYMPHOCYTES NFR BLD MANUAL: 3 % (ref 5–12)
MCH RBC QN AUTO: 29.2 PG (ref 26.6–33)
MCHC RBC AUTO-ENTMCNC: 32.3 G/DL (ref 31.5–35.7)
MCV RBC AUTO: 90.6 FL (ref 79–97)
MONOCYTES # BLD: 0.33 10*3/MM3 (ref 0.1–0.9)
NEUTROPHILS # BLD AUTO: 5.68 10*3/MM3 (ref 1.7–7)
NEUTROPHILS NFR BLD MANUAL: 51 % (ref 42.7–76)
NRBC SPEC MANUAL: 1 /100 WBC (ref 0–0.2)
PLAT MORPH BLD: NORMAL
PLATELET # BLD AUTO: 273 10*3/MM3 (ref 140–450)
PMV BLD AUTO: 8.7 FL (ref 6–12)
POTASSIUM SERPL-SCNC: 3.5 MMOL/L (ref 3.5–5.2)
PROT SERPL-MCNC: 6.6 G/DL (ref 6–8.5)
RBC # BLD AUTO: 4.48 10*6/MM3 (ref 3.77–5.28)
RBC MORPH BLD: NORMAL
SCAN SLIDE: NORMAL
SODIUM SERPL-SCNC: 139 MMOL/L (ref 136–145)
TOXIC GRANULATION: ABNORMAL
VARIANT LYMPHS NFR BLD MANUAL: 40 % (ref 19.6–45.3)
WBC NRBC COR # BLD: 11.13 10*3/MM3 (ref 3.4–10.8)

## 2022-07-07 PROCEDURE — 85025 COMPLETE CBC W/AUTO DIFF WBC: CPT | Performed by: INTERNAL MEDICINE

## 2022-07-07 PROCEDURE — 80053 COMPREHEN METABOLIC PANEL: CPT | Performed by: INTERNAL MEDICINE

## 2022-08-24 ENCOUNTER — APPOINTMENT (OUTPATIENT)
Dept: CT IMAGING | Facility: HOSPITAL | Age: 72
End: 2022-08-24

## 2022-08-24 ENCOUNTER — APPOINTMENT (OUTPATIENT)
Dept: GENERAL RADIOLOGY | Facility: HOSPITAL | Age: 72
End: 2022-08-24

## 2022-08-24 ENCOUNTER — HOSPITAL ENCOUNTER (EMERGENCY)
Facility: HOSPITAL | Age: 72
Discharge: NURSING, REHAB OR LONG-TERM CARE | End: 2022-08-24
Attending: STUDENT IN AN ORGANIZED HEALTH CARE EDUCATION/TRAINING PROGRAM | Admitting: EMERGENCY MEDICINE

## 2022-08-24 VITALS
TEMPERATURE: 98.6 F | SYSTOLIC BLOOD PRESSURE: 156 MMHG | DIASTOLIC BLOOD PRESSURE: 85 MMHG | HEART RATE: 85 BPM | HEIGHT: 64 IN | WEIGHT: 260 LBS | BODY MASS INDEX: 44.39 KG/M2 | OXYGEN SATURATION: 96 % | RESPIRATION RATE: 16 BRPM

## 2022-08-24 DIAGNOSIS — M54.6 ACUTE MIDLINE THORACIC BACK PAIN: Primary | ICD-10-CM

## 2022-08-24 DIAGNOSIS — N39.0 ACUTE UTI: ICD-10-CM

## 2022-08-24 LAB
ALBUMIN SERPL-MCNC: 3.77 G/DL (ref 3.5–5.2)
ALBUMIN/GLOB SERPL: 1.1 G/DL
ALP SERPL-CCNC: 156 U/L (ref 39–117)
ALT SERPL W P-5'-P-CCNC: 22 U/L (ref 1–33)
ANION GAP SERPL CALCULATED.3IONS-SCNC: 11.7 MMOL/L (ref 5–15)
APTT PPP: 30.5 SECONDS (ref 26.5–34.5)
AST SERPL-CCNC: 29 U/L (ref 1–32)
BACTERIA UR QL AUTO: ABNORMAL /HPF
BASOPHILS # BLD AUTO: 0.06 10*3/MM3 (ref 0–0.2)
BASOPHILS NFR BLD AUTO: 0.5 % (ref 0–1.5)
BILIRUB SERPL-MCNC: 0.5 MG/DL (ref 0–1.2)
BILIRUB UR QL STRIP: NEGATIVE
BUN SERPL-MCNC: 17 MG/DL (ref 8–23)
BUN/CREAT SERPL: 18.5 (ref 7–25)
CALCIUM SPEC-SCNC: 9.8 MG/DL (ref 8.6–10.5)
CHLORIDE SERPL-SCNC: 96 MMOL/L (ref 98–107)
CK SERPL-CCNC: 53 U/L (ref 20–180)
CLARITY UR: CLEAR
CO2 SERPL-SCNC: 28.3 MMOL/L (ref 22–29)
COLOR UR: YELLOW
CREAT SERPL-MCNC: 0.92 MG/DL (ref 0.57–1)
CRP SERPL-MCNC: 1.1 MG/DL (ref 0–0.5)
D-LACTATE SERPL-SCNC: 1.6 MMOL/L (ref 0.5–2)
DEPRECATED RDW RBC AUTO: 41.6 FL (ref 37–54)
EGFRCR SERPLBLD CKD-EPI 2021: 65.5 ML/MIN/1.73
EOSINOPHIL # BLD AUTO: 0.27 10*3/MM3 (ref 0–0.4)
EOSINOPHIL NFR BLD AUTO: 2.3 % (ref 0.3–6.2)
ERYTHROCYTE [DISTWIDTH] IN BLOOD BY AUTOMATED COUNT: 12.4 % (ref 12.3–15.4)
FLUAV RNA RESP QL NAA+PROBE: NOT DETECTED
FLUBV RNA RESP QL NAA+PROBE: NOT DETECTED
GLOBULIN UR ELPH-MCNC: 3.4 GM/DL
GLUCOSE BLDC GLUCOMTR-MCNC: 227 MG/DL (ref 70–130)
GLUCOSE SERPL-MCNC: 235 MG/DL (ref 65–99)
GLUCOSE UR STRIP-MCNC: ABNORMAL MG/DL
HCT VFR BLD AUTO: 43.5 % (ref 34–46.6)
HGB BLD-MCNC: 14.3 G/DL (ref 12–15.9)
HGB UR QL STRIP.AUTO: NEGATIVE
HOLD SPECIMEN: NORMAL
HOLD SPECIMEN: NORMAL
HYALINE CASTS UR QL AUTO: ABNORMAL /LPF
IMM GRANULOCYTES # BLD AUTO: 0.04 10*3/MM3 (ref 0–0.05)
IMM GRANULOCYTES NFR BLD AUTO: 0.3 % (ref 0–0.5)
INR PPP: 0.95 (ref 0.9–1.1)
KETONES UR QL STRIP: ABNORMAL
LEUKOCYTE ESTERASE UR QL STRIP.AUTO: NEGATIVE
LIPASE SERPL-CCNC: 51 U/L (ref 13–60)
LYMPHOCYTES # BLD AUTO: 3.22 10*3/MM3 (ref 0.7–3.1)
LYMPHOCYTES NFR BLD AUTO: 27.2 % (ref 19.6–45.3)
MAGNESIUM SERPL-MCNC: 1.9 MG/DL (ref 1.6–2.4)
MCH RBC QN AUTO: 30 PG (ref 26.6–33)
MCHC RBC AUTO-ENTMCNC: 32.9 G/DL (ref 31.5–35.7)
MCV RBC AUTO: 91.4 FL (ref 79–97)
MONOCYTES # BLD AUTO: 0.79 10*3/MM3 (ref 0.1–0.9)
MONOCYTES NFR BLD AUTO: 6.7 % (ref 5–12)
NEUTROPHILS NFR BLD AUTO: 63 % (ref 42.7–76)
NEUTROPHILS NFR BLD AUTO: 7.48 10*3/MM3 (ref 1.7–7)
NITRITE UR QL STRIP: POSITIVE
NRBC BLD AUTO-RTO: 0 /100 WBC (ref 0–0.2)
PH UR STRIP.AUTO: 5.5 [PH] (ref 5–8)
PLATELET # BLD AUTO: 309 10*3/MM3 (ref 140–450)
PMV BLD AUTO: 8.3 FL (ref 6–12)
POTASSIUM SERPL-SCNC: 3.8 MMOL/L (ref 3.5–5.2)
PROCALCITONIN SERPL-MCNC: 0.08 NG/ML (ref 0–0.25)
PROT SERPL-MCNC: 7.2 G/DL (ref 6–8.5)
PROT UR QL STRIP: NEGATIVE
PROTHROMBIN TIME: 12.9 SECONDS (ref 12.1–14.7)
RBC # BLD AUTO: 4.76 10*6/MM3 (ref 3.77–5.28)
RBC # UR STRIP: ABNORMAL /HPF
REF LAB TEST METHOD: ABNORMAL
SARS-COV-2 RNA RESP QL NAA+PROBE: NOT DETECTED
SODIUM SERPL-SCNC: 136 MMOL/L (ref 136–145)
SP GR UR STRIP: 1.02 (ref 1–1.03)
SQUAMOUS #/AREA URNS HPF: ABNORMAL /HPF
TROPONIN T SERPL-MCNC: <0.01 NG/ML (ref 0–0.03)
TROPONIN T SERPL-MCNC: <0.01 NG/ML (ref 0–0.03)
UROBILINOGEN UR QL STRIP: ABNORMAL
WBC # UR STRIP: ABNORMAL /HPF
WBC NRBC COR # BLD: 11.86 10*3/MM3 (ref 3.4–10.8)
WHOLE BLOOD HOLD COAG: NORMAL
WHOLE BLOOD HOLD SPECIMEN: NORMAL

## 2022-08-24 PROCEDURE — 85610 PROTHROMBIN TIME: CPT | Performed by: PHYSICIAN ASSISTANT

## 2022-08-24 PROCEDURE — 93010 ELECTROCARDIOGRAM REPORT: CPT | Performed by: INTERNAL MEDICINE

## 2022-08-24 PROCEDURE — 85730 THROMBOPLASTIN TIME PARTIAL: CPT | Performed by: PHYSICIAN ASSISTANT

## 2022-08-24 PROCEDURE — 81001 URINALYSIS AUTO W/SCOPE: CPT | Performed by: PHYSICIAN ASSISTANT

## 2022-08-24 PROCEDURE — 86140 C-REACTIVE PROTEIN: CPT | Performed by: PHYSICIAN ASSISTANT

## 2022-08-24 PROCEDURE — 72131 CT LUMBAR SPINE W/O DYE: CPT | Performed by: RADIOLOGY

## 2022-08-24 PROCEDURE — 83690 ASSAY OF LIPASE: CPT | Performed by: PHYSICIAN ASSISTANT

## 2022-08-24 PROCEDURE — 73030 X-RAY EXAM OF SHOULDER: CPT | Performed by: RADIOLOGY

## 2022-08-24 PROCEDURE — 80053 COMPREHEN METABOLIC PANEL: CPT | Performed by: PHYSICIAN ASSISTANT

## 2022-08-24 PROCEDURE — 72128 CT CHEST SPINE W/O DYE: CPT

## 2022-08-24 PROCEDURE — 83605 ASSAY OF LACTIC ACID: CPT | Performed by: PHYSICIAN ASSISTANT

## 2022-08-24 PROCEDURE — 70450 CT HEAD/BRAIN W/O DYE: CPT | Performed by: PHYSICIAN ASSISTANT

## 2022-08-24 PROCEDURE — 84484 ASSAY OF TROPONIN QUANT: CPT | Performed by: PHYSICIAN ASSISTANT

## 2022-08-24 PROCEDURE — 82550 ASSAY OF CK (CPK): CPT | Performed by: PHYSICIAN ASSISTANT

## 2022-08-24 PROCEDURE — 87077 CULTURE AEROBIC IDENTIFY: CPT | Performed by: PHYSICIAN ASSISTANT

## 2022-08-24 PROCEDURE — 71250 CT THORAX DX C-: CPT | Performed by: RADIOLOGY

## 2022-08-24 PROCEDURE — 72128 CT CHEST SPINE W/O DYE: CPT | Performed by: RADIOLOGY

## 2022-08-24 PROCEDURE — 73030 X-RAY EXAM OF SHOULDER: CPT

## 2022-08-24 PROCEDURE — 25010000002 MORPHINE PER 10 MG: Performed by: STUDENT IN AN ORGANIZED HEALTH CARE EDUCATION/TRAINING PROGRAM

## 2022-08-24 PROCEDURE — 70450 CT HEAD/BRAIN W/O DYE: CPT | Performed by: RADIOLOGY

## 2022-08-24 PROCEDURE — 87086 URINE CULTURE/COLONY COUNT: CPT | Performed by: PHYSICIAN ASSISTANT

## 2022-08-24 PROCEDURE — 87636 SARSCOV2 & INF A&B AMP PRB: CPT | Performed by: PHYSICIAN ASSISTANT

## 2022-08-24 PROCEDURE — 72131 CT LUMBAR SPINE W/O DYE: CPT

## 2022-08-24 PROCEDURE — 74176 CT ABD & PELVIS W/O CONTRAST: CPT | Performed by: RADIOLOGY

## 2022-08-24 PROCEDURE — 96375 TX/PRO/DX INJ NEW DRUG ADDON: CPT

## 2022-08-24 PROCEDURE — 96374 THER/PROPH/DIAG INJ IV PUSH: CPT

## 2022-08-24 PROCEDURE — 93005 ELECTROCARDIOGRAM TRACING: CPT | Performed by: PHYSICIAN ASSISTANT

## 2022-08-24 PROCEDURE — 85025 COMPLETE CBC W/AUTO DIFF WBC: CPT | Performed by: PHYSICIAN ASSISTANT

## 2022-08-24 PROCEDURE — 87040 BLOOD CULTURE FOR BACTERIA: CPT | Performed by: PHYSICIAN ASSISTANT

## 2022-08-24 PROCEDURE — 74176 CT ABD & PELVIS W/O CONTRAST: CPT

## 2022-08-24 PROCEDURE — 87186 SC STD MICRODIL/AGAR DIL: CPT | Performed by: PHYSICIAN ASSISTANT

## 2022-08-24 PROCEDURE — 72125 CT NECK SPINE W/O DYE: CPT | Performed by: RADIOLOGY

## 2022-08-24 PROCEDURE — 36415 COLL VENOUS BLD VENIPUNCTURE: CPT

## 2022-08-24 PROCEDURE — 83735 ASSAY OF MAGNESIUM: CPT | Performed by: PHYSICIAN ASSISTANT

## 2022-08-24 PROCEDURE — 84145 PROCALCITONIN (PCT): CPT | Performed by: PHYSICIAN ASSISTANT

## 2022-08-24 PROCEDURE — 71250 CT THORAX DX C-: CPT

## 2022-08-24 PROCEDURE — 25010000002 ONDANSETRON PER 1 MG: Performed by: PHYSICIAN ASSISTANT

## 2022-08-24 PROCEDURE — 99284 EMERGENCY DEPT VISIT MOD MDM: CPT

## 2022-08-24 PROCEDURE — 72125 CT NECK SPINE W/O DYE: CPT

## 2022-08-24 PROCEDURE — 82962 GLUCOSE BLOOD TEST: CPT

## 2022-08-24 RX ORDER — MORPHINE SULFATE 2 MG/ML
2 INJECTION, SOLUTION INTRAMUSCULAR; INTRAVENOUS ONCE
Status: COMPLETED | OUTPATIENT
Start: 2022-08-24 | End: 2022-08-24

## 2022-08-24 RX ORDER — ONDANSETRON 2 MG/ML
4 INJECTION INTRAMUSCULAR; INTRAVENOUS ONCE
Status: COMPLETED | OUTPATIENT
Start: 2022-08-24 | End: 2022-08-24

## 2022-08-24 RX ORDER — CEFDINIR 300 MG/1
300 CAPSULE ORAL 2 TIMES DAILY
Qty: 14 CAPSULE | Refills: 0 | Status: SHIPPED | OUTPATIENT
Start: 2022-08-24 | End: 2022-08-31

## 2022-08-24 RX ORDER — SODIUM CHLORIDE 0.9 % (FLUSH) 0.9 %
10 SYRINGE (ML) INJECTION AS NEEDED
Status: DISCONTINUED | OUTPATIENT
Start: 2022-08-24 | End: 2022-08-24 | Stop reason: HOSPADM

## 2022-08-24 RX ORDER — CEFDINIR 300 MG/1
300 CAPSULE ORAL ONCE
Status: COMPLETED | OUTPATIENT
Start: 2022-08-24 | End: 2022-08-24

## 2022-08-24 RX ADMIN — CEFDINIR 300 MG: 300 CAPSULE ORAL at 19:55

## 2022-08-24 RX ADMIN — ONDANSETRON 4 MG: 2 INJECTION INTRAMUSCULAR; INTRAVENOUS at 16:48

## 2022-08-24 RX ADMIN — MORPHINE SULFATE 2 MG: 2 INJECTION, SOLUTION INTRAMUSCULAR; INTRAVENOUS at 16:47

## 2022-08-25 LAB
QT INTERVAL: 426 MS
QTC INTERVAL: 456 MS

## 2022-08-27 LAB — BACTERIA SPEC AEROBE CULT: ABNORMAL

## 2022-08-29 LAB
BACTERIA SPEC AEROBE CULT: NORMAL
BACTERIA SPEC AEROBE CULT: NORMAL

## 2022-09-02 ENCOUNTER — LAB REQUISITION (OUTPATIENT)
Dept: LAB | Facility: HOSPITAL | Age: 72
End: 2022-09-02

## 2022-09-02 DIAGNOSIS — E11.9 TYPE 2 DIABETES MELLITUS WITHOUT COMPLICATIONS: ICD-10-CM

## 2022-09-02 DIAGNOSIS — D64.9 ANEMIA, UNSPECIFIED: ICD-10-CM

## 2022-09-02 DIAGNOSIS — I10 ESSENTIAL (PRIMARY) HYPERTENSION: ICD-10-CM

## 2022-09-02 LAB
ALBUMIN SERPL-MCNC: 3.87 G/DL (ref 3.5–5.2)
ALBUMIN/GLOB SERPL: 1.3 G/DL
ALP SERPL-CCNC: 124 U/L (ref 39–117)
ALT SERPL W P-5'-P-CCNC: 20 U/L (ref 1–33)
ANION GAP SERPL CALCULATED.3IONS-SCNC: 11.6 MMOL/L (ref 5–15)
AST SERPL-CCNC: 23 U/L (ref 1–32)
BASOPHILS # BLD AUTO: 0.05 10*3/MM3 (ref 0–0.2)
BASOPHILS NFR BLD AUTO: 0.4 % (ref 0–1.5)
BILIRUB SERPL-MCNC: 0.6 MG/DL (ref 0–1.2)
BUN SERPL-MCNC: 15 MG/DL (ref 8–23)
BUN/CREAT SERPL: 19 (ref 7–25)
CALCIUM SPEC-SCNC: 9.5 MG/DL (ref 8.6–10.5)
CHLORIDE SERPL-SCNC: 98 MMOL/L (ref 98–107)
CO2 SERPL-SCNC: 28.4 MMOL/L (ref 22–29)
CREAT SERPL-MCNC: 0.79 MG/DL (ref 0.57–1)
DEPRECATED RDW RBC AUTO: 42.9 FL (ref 37–54)
EGFRCR SERPLBLD CKD-EPI 2021: 78.6 ML/MIN/1.73
EOSINOPHIL # BLD AUTO: 0.35 10*3/MM3 (ref 0–0.4)
EOSINOPHIL NFR BLD AUTO: 2.9 % (ref 0.3–6.2)
ERYTHROCYTE [DISTWIDTH] IN BLOOD BY AUTOMATED COUNT: 12.9 % (ref 12.3–15.4)
GLOBULIN UR ELPH-MCNC: 3 GM/DL
GLUCOSE SERPL-MCNC: 185 MG/DL (ref 65–99)
HBA1C MFR BLD: 10.9 % (ref 4.8–5.6)
HCT VFR BLD AUTO: 41.4 % (ref 34–46.6)
HGB BLD-MCNC: 13.5 G/DL (ref 12–15.9)
IMM GRANULOCYTES # BLD AUTO: 0.04 10*3/MM3 (ref 0–0.05)
IMM GRANULOCYTES NFR BLD AUTO: 0.3 % (ref 0–0.5)
LYMPHOCYTES # BLD AUTO: 3.81 10*3/MM3 (ref 0.7–3.1)
LYMPHOCYTES NFR BLD AUTO: 31.7 % (ref 19.6–45.3)
MCH RBC QN AUTO: 29.7 PG (ref 26.6–33)
MCHC RBC AUTO-ENTMCNC: 32.6 G/DL (ref 31.5–35.7)
MCV RBC AUTO: 91.2 FL (ref 79–97)
MONOCYTES # BLD AUTO: 0.8 10*3/MM3 (ref 0.1–0.9)
MONOCYTES NFR BLD AUTO: 6.7 % (ref 5–12)
NEUTROPHILS NFR BLD AUTO: 58 % (ref 42.7–76)
NEUTROPHILS NFR BLD AUTO: 6.96 10*3/MM3 (ref 1.7–7)
NRBC BLD AUTO-RTO: 0 /100 WBC (ref 0–0.2)
PLATELET # BLD AUTO: 307 10*3/MM3 (ref 140–450)
PMV BLD AUTO: 9.1 FL (ref 6–12)
POTASSIUM SERPL-SCNC: 3.7 MMOL/L (ref 3.5–5.2)
PROT SERPL-MCNC: 6.9 G/DL (ref 6–8.5)
RBC # BLD AUTO: 4.54 10*6/MM3 (ref 3.77–5.28)
SODIUM SERPL-SCNC: 138 MMOL/L (ref 136–145)
WBC NRBC COR # BLD: 12.01 10*3/MM3 (ref 3.4–10.8)

## 2022-09-02 PROCEDURE — 85025 COMPLETE CBC W/AUTO DIFF WBC: CPT | Performed by: INTERNAL MEDICINE

## 2022-09-02 PROCEDURE — 83036 HEMOGLOBIN GLYCOSYLATED A1C: CPT | Performed by: INTERNAL MEDICINE

## 2022-09-02 PROCEDURE — 80053 COMPREHEN METABOLIC PANEL: CPT | Performed by: INTERNAL MEDICINE

## 2022-09-15 ENCOUNTER — LAB REQUISITION (OUTPATIENT)
Dept: LAB | Facility: HOSPITAL | Age: 72
End: 2022-09-15

## 2022-09-15 DIAGNOSIS — I10 ESSENTIAL (PRIMARY) HYPERTENSION: ICD-10-CM

## 2022-09-15 DIAGNOSIS — E11.9 TYPE 2 DIABETES MELLITUS WITHOUT COMPLICATIONS: ICD-10-CM

## 2022-09-15 LAB
ALBUMIN SERPL-MCNC: 3.73 G/DL (ref 3.5–5.2)
ALBUMIN/GLOB SERPL: 1.2 G/DL
ALP SERPL-CCNC: 123 U/L (ref 39–117)
ALT SERPL W P-5'-P-CCNC: 19 U/L (ref 1–33)
ANION GAP SERPL CALCULATED.3IONS-SCNC: 10.4 MMOL/L (ref 5–15)
AST SERPL-CCNC: 23 U/L (ref 1–32)
BASOPHILS # BLD AUTO: 0.06 10*3/MM3 (ref 0–0.2)
BASOPHILS NFR BLD AUTO: 0.5 % (ref 0–1.5)
BILIRUB SERPL-MCNC: 0.6 MG/DL (ref 0–1.2)
BUN SERPL-MCNC: 17 MG/DL (ref 8–23)
BUN/CREAT SERPL: 21.5 (ref 7–25)
CALCIUM SPEC-SCNC: 9.7 MG/DL (ref 8.6–10.5)
CHLORIDE SERPL-SCNC: 98 MMOL/L (ref 98–107)
CO2 SERPL-SCNC: 31.6 MMOL/L (ref 22–29)
CREAT SERPL-MCNC: 0.79 MG/DL (ref 0.57–1)
DEPRECATED RDW RBC AUTO: 41.6 FL (ref 37–54)
EGFRCR SERPLBLD CKD-EPI 2021: 78.6 ML/MIN/1.73
EOSINOPHIL # BLD AUTO: 0.33 10*3/MM3 (ref 0–0.4)
EOSINOPHIL NFR BLD AUTO: 2.5 % (ref 0.3–6.2)
ERYTHROCYTE [DISTWIDTH] IN BLOOD BY AUTOMATED COUNT: 12.8 % (ref 12.3–15.4)
GLOBULIN UR ELPH-MCNC: 3.2 GM/DL
GLUCOSE SERPL-MCNC: 205 MG/DL (ref 65–99)
HBA1C MFR BLD: 10.8 % (ref 4.8–5.6)
HCT VFR BLD AUTO: 41.8 % (ref 34–46.6)
HGB BLD-MCNC: 13.5 G/DL (ref 12–15.9)
IMM GRANULOCYTES # BLD AUTO: 0.04 10*3/MM3 (ref 0–0.05)
IMM GRANULOCYTES NFR BLD AUTO: 0.3 % (ref 0–0.5)
LYMPHOCYTES # BLD AUTO: 4.1 10*3/MM3 (ref 0.7–3.1)
LYMPHOCYTES NFR BLD AUTO: 31.5 % (ref 19.6–45.3)
MCH RBC QN AUTO: 29.4 PG (ref 26.6–33)
MCHC RBC AUTO-ENTMCNC: 32.3 G/DL (ref 31.5–35.7)
MCV RBC AUTO: 91.1 FL (ref 79–97)
MONOCYTES # BLD AUTO: 0.71 10*3/MM3 (ref 0.1–0.9)
MONOCYTES NFR BLD AUTO: 5.5 % (ref 5–12)
NEUTROPHILS NFR BLD AUTO: 59.7 % (ref 42.7–76)
NEUTROPHILS NFR BLD AUTO: 7.77 10*3/MM3 (ref 1.7–7)
NRBC BLD AUTO-RTO: 0 /100 WBC (ref 0–0.2)
PLATELET # BLD AUTO: 267 10*3/MM3 (ref 140–450)
PMV BLD AUTO: 8.9 FL (ref 6–12)
POTASSIUM SERPL-SCNC: 3.9 MMOL/L (ref 3.5–5.2)
PROT SERPL-MCNC: 6.9 G/DL (ref 6–8.5)
RBC # BLD AUTO: 4.59 10*6/MM3 (ref 3.77–5.28)
SODIUM SERPL-SCNC: 140 MMOL/L (ref 136–145)
WBC NRBC COR # BLD: 13.01 10*3/MM3 (ref 3.4–10.8)

## 2022-09-15 PROCEDURE — 80053 COMPREHEN METABOLIC PANEL: CPT | Performed by: INTERNAL MEDICINE

## 2022-09-15 PROCEDURE — 83036 HEMOGLOBIN GLYCOSYLATED A1C: CPT | Performed by: INTERNAL MEDICINE

## 2022-09-15 PROCEDURE — 85025 COMPLETE CBC W/AUTO DIFF WBC: CPT | Performed by: INTERNAL MEDICINE

## 2022-09-16 ENCOUNTER — LAB REQUISITION (OUTPATIENT)
Dept: LAB | Facility: HOSPITAL | Age: 72
End: 2022-09-16

## 2022-09-16 DIAGNOSIS — R30.0 DYSURIA: ICD-10-CM

## 2022-09-16 PROCEDURE — 87077 CULTURE AEROBIC IDENTIFY: CPT | Performed by: INTERNAL MEDICINE

## 2022-09-16 PROCEDURE — 87186 SC STD MICRODIL/AGAR DIL: CPT | Performed by: INTERNAL MEDICINE

## 2022-09-16 PROCEDURE — 87086 URINE CULTURE/COLONY COUNT: CPT | Performed by: INTERNAL MEDICINE

## 2022-09-18 ENCOUNTER — LAB REQUISITION (OUTPATIENT)
Dept: LAB | Facility: HOSPITAL | Age: 72
End: 2022-09-18

## 2022-09-18 DIAGNOSIS — N18.9 CHRONIC KIDNEY DISEASE, UNSPECIFIED: ICD-10-CM

## 2022-09-18 DIAGNOSIS — I12.9 HYPERTENSIVE CHRONIC KIDNEY DISEASE WITH STAGE 1 THROUGH STAGE 4 CHRONIC KIDNEY DISEASE, OR UNSPECIFIED CHRONIC KIDNEY DISEASE: ICD-10-CM

## 2022-09-18 LAB
ALBUMIN SERPL-MCNC: 4.35 G/DL (ref 3.5–5.2)
ALBUMIN/GLOB SERPL: 1.2 G/DL
ALP SERPL-CCNC: 145 U/L (ref 39–117)
ALT SERPL W P-5'-P-CCNC: 23 U/L (ref 1–33)
AMMONIA BLD-SCNC: 28 UMOL/L (ref 11–51)
ANION GAP SERPL CALCULATED.3IONS-SCNC: 13 MMOL/L (ref 5–15)
AST SERPL-CCNC: 28 U/L (ref 1–32)
BACTERIA SPEC AEROBE CULT: ABNORMAL
BASOPHILS # BLD AUTO: 0.07 10*3/MM3 (ref 0–0.2)
BASOPHILS NFR BLD AUTO: 0.6 % (ref 0–1.5)
BILIRUB SERPL-MCNC: 0.8 MG/DL (ref 0–1.2)
BUN SERPL-MCNC: 18 MG/DL (ref 8–23)
BUN/CREAT SERPL: 17.5 (ref 7–25)
CALCIUM SPEC-SCNC: 10.2 MG/DL (ref 8.6–10.5)
CHLORIDE SERPL-SCNC: 93 MMOL/L (ref 98–107)
CO2 SERPL-SCNC: 30 MMOL/L (ref 22–29)
CREAT SERPL-MCNC: 1.03 MG/DL (ref 0.57–1)
DEPRECATED RDW RBC AUTO: 42.8 FL (ref 37–54)
EGFRCR SERPLBLD CKD-EPI 2021: 57.2 ML/MIN/1.73
EOSINOPHIL # BLD AUTO: 0.29 10*3/MM3 (ref 0–0.4)
EOSINOPHIL NFR BLD AUTO: 2.5 % (ref 0.3–6.2)
ERYTHROCYTE [DISTWIDTH] IN BLOOD BY AUTOMATED COUNT: 13.1 % (ref 12.3–15.4)
GLOBULIN UR ELPH-MCNC: 3.6 GM/DL
GLUCOSE SERPL-MCNC: 381 MG/DL (ref 65–99)
HCT VFR BLD AUTO: 45.9 % (ref 34–46.6)
HGB BLD-MCNC: 15 G/DL (ref 12–15.9)
IMM GRANULOCYTES # BLD AUTO: 0.04 10*3/MM3 (ref 0–0.05)
IMM GRANULOCYTES NFR BLD AUTO: 0.3 % (ref 0–0.5)
LYMPHOCYTES # BLD AUTO: 3.55 10*3/MM3 (ref 0.7–3.1)
LYMPHOCYTES NFR BLD AUTO: 30.1 % (ref 19.6–45.3)
MCH RBC QN AUTO: 29.8 PG (ref 26.6–33)
MCHC RBC AUTO-ENTMCNC: 32.7 G/DL (ref 31.5–35.7)
MCV RBC AUTO: 91.1 FL (ref 79–97)
MONOCYTES # BLD AUTO: 0.58 10*3/MM3 (ref 0.1–0.9)
MONOCYTES NFR BLD AUTO: 4.9 % (ref 5–12)
NEUTROPHILS NFR BLD AUTO: 61.6 % (ref 42.7–76)
NEUTROPHILS NFR BLD AUTO: 7.27 10*3/MM3 (ref 1.7–7)
NRBC BLD AUTO-RTO: 0 /100 WBC (ref 0–0.2)
PLATELET # BLD AUTO: 299 10*3/MM3 (ref 140–450)
PMV BLD AUTO: 8.8 FL (ref 6–12)
POTASSIUM SERPL-SCNC: 4.5 MMOL/L (ref 3.5–5.2)
PROT SERPL-MCNC: 7.9 G/DL (ref 6–8.5)
RBC # BLD AUTO: 5.04 10*6/MM3 (ref 3.77–5.28)
SODIUM SERPL-SCNC: 136 MMOL/L (ref 136–145)
WBC NRBC COR # BLD: 11.8 10*3/MM3 (ref 3.4–10.8)

## 2022-09-18 PROCEDURE — 80053 COMPREHEN METABOLIC PANEL: CPT | Performed by: INTERNAL MEDICINE

## 2022-09-18 PROCEDURE — 82140 ASSAY OF AMMONIA: CPT | Performed by: INTERNAL MEDICINE

## 2022-09-18 PROCEDURE — 85025 COMPLETE CBC W/AUTO DIFF WBC: CPT | Performed by: INTERNAL MEDICINE

## 2022-09-22 ENCOUNTER — HOSPITAL ENCOUNTER (EMERGENCY)
Facility: HOSPITAL | Age: 72
Discharge: NURSING, REHAB OR LONG-TERM CARE | End: 2022-09-22
Attending: STUDENT IN AN ORGANIZED HEALTH CARE EDUCATION/TRAINING PROGRAM | Admitting: STUDENT IN AN ORGANIZED HEALTH CARE EDUCATION/TRAINING PROGRAM

## 2022-09-22 ENCOUNTER — APPOINTMENT (OUTPATIENT)
Dept: CT IMAGING | Facility: HOSPITAL | Age: 72
End: 2022-09-22

## 2022-09-22 ENCOUNTER — APPOINTMENT (OUTPATIENT)
Dept: GENERAL RADIOLOGY | Facility: HOSPITAL | Age: 72
End: 2022-09-22

## 2022-09-22 VITALS
WEIGHT: 260 LBS | TEMPERATURE: 98.5 F | DIASTOLIC BLOOD PRESSURE: 69 MMHG | RESPIRATION RATE: 18 BRPM | HEART RATE: 73 BPM | HEIGHT: 64 IN | BODY MASS INDEX: 44.39 KG/M2 | SYSTOLIC BLOOD PRESSURE: 137 MMHG | OXYGEN SATURATION: 92 %

## 2022-09-22 DIAGNOSIS — R10.13 ABDOMINAL PAIN, EPIGASTRIC: ICD-10-CM

## 2022-09-22 DIAGNOSIS — R07.9 NONSPECIFIC CHEST PAIN: Primary | ICD-10-CM

## 2022-09-22 LAB
ALBUMIN SERPL-MCNC: 3.67 G/DL (ref 3.5–5.2)
ALBUMIN/GLOB SERPL: 1.1 G/DL
ALP SERPL-CCNC: 112 U/L (ref 39–117)
ALT SERPL W P-5'-P-CCNC: 23 U/L (ref 1–33)
ANION GAP SERPL CALCULATED.3IONS-SCNC: 11.7 MMOL/L (ref 5–15)
APTT PPP: 29.6 SECONDS (ref 26.5–34.5)
AST SERPL-CCNC: 31 U/L (ref 1–32)
BASOPHILS # BLD AUTO: 0.05 10*3/MM3 (ref 0–0.2)
BASOPHILS NFR BLD AUTO: 0.4 % (ref 0–1.5)
BILIRUB SERPL-MCNC: 0.6 MG/DL (ref 0–1.2)
BUN SERPL-MCNC: 15 MG/DL (ref 8–23)
BUN/CREAT SERPL: 17.6 (ref 7–25)
CALCIUM SPEC-SCNC: 9.8 MG/DL (ref 8.6–10.5)
CHLORIDE SERPL-SCNC: 104 MMOL/L (ref 98–107)
CO2 SERPL-SCNC: 27.3 MMOL/L (ref 22–29)
CREAT SERPL-MCNC: 0.85 MG/DL (ref 0.57–1)
DEPRECATED RDW RBC AUTO: 45.3 FL (ref 37–54)
EGFRCR SERPLBLD CKD-EPI 2021: 72 ML/MIN/1.73
EOSINOPHIL # BLD AUTO: 0.39 10*3/MM3 (ref 0–0.4)
EOSINOPHIL NFR BLD AUTO: 3.3 % (ref 0.3–6.2)
ERYTHROCYTE [DISTWIDTH] IN BLOOD BY AUTOMATED COUNT: 13.2 % (ref 12.3–15.4)
FLUAV RNA RESP QL NAA+PROBE: NOT DETECTED
FLUBV RNA RESP QL NAA+PROBE: NOT DETECTED
GLOBULIN UR ELPH-MCNC: 3.3 GM/DL
GLUCOSE SERPL-MCNC: 119 MG/DL (ref 65–99)
HCT VFR BLD AUTO: 43 % (ref 34–46.6)
HGB BLD-MCNC: 13.5 G/DL (ref 12–15.9)
HOLD SPECIMEN: NORMAL
HOLD SPECIMEN: NORMAL
IMM GRANULOCYTES # BLD AUTO: 0.04 10*3/MM3 (ref 0–0.05)
IMM GRANULOCYTES NFR BLD AUTO: 0.3 % (ref 0–0.5)
INR PPP: 1.02 (ref 0.9–1.1)
LIPASE SERPL-CCNC: 20 U/L (ref 13–60)
LYMPHOCYTES # BLD AUTO: 2.98 10*3/MM3 (ref 0.7–3.1)
LYMPHOCYTES NFR BLD AUTO: 25.1 % (ref 19.6–45.3)
MCH RBC QN AUTO: 29.3 PG (ref 26.6–33)
MCHC RBC AUTO-ENTMCNC: 31.4 G/DL (ref 31.5–35.7)
MCV RBC AUTO: 93.5 FL (ref 79–97)
MONOCYTES # BLD AUTO: 0.82 10*3/MM3 (ref 0.1–0.9)
MONOCYTES NFR BLD AUTO: 6.9 % (ref 5–12)
NEUTROPHILS NFR BLD AUTO: 64 % (ref 42.7–76)
NEUTROPHILS NFR BLD AUTO: 7.61 10*3/MM3 (ref 1.7–7)
NRBC BLD AUTO-RTO: 0 /100 WBC (ref 0–0.2)
PLATELET # BLD AUTO: 268 10*3/MM3 (ref 140–450)
PMV BLD AUTO: 8.4 FL (ref 6–12)
POTASSIUM SERPL-SCNC: 3.9 MMOL/L (ref 3.5–5.2)
PROT SERPL-MCNC: 7 G/DL (ref 6–8.5)
PROTHROMBIN TIME: 13.6 SECONDS (ref 12.1–14.7)
QT INTERVAL: 410 MS
QTC INTERVAL: 455 MS
RBC # BLD AUTO: 4.6 10*6/MM3 (ref 3.77–5.28)
SARS-COV-2 RNA RESP QL NAA+PROBE: NOT DETECTED
SODIUM SERPL-SCNC: 143 MMOL/L (ref 136–145)
TROPONIN T SERPL-MCNC: <0.01 NG/ML (ref 0–0.03)
TROPONIN T SERPL-MCNC: <0.01 NG/ML (ref 0–0.03)
WBC NRBC COR # BLD: 11.89 10*3/MM3 (ref 3.4–10.8)
WHOLE BLOOD HOLD COAG: NORMAL
WHOLE BLOOD HOLD SPECIMEN: NORMAL

## 2022-09-22 PROCEDURE — 85610 PROTHROMBIN TIME: CPT | Performed by: STUDENT IN AN ORGANIZED HEALTH CARE EDUCATION/TRAINING PROGRAM

## 2022-09-22 PROCEDURE — 93005 ELECTROCARDIOGRAM TRACING: CPT | Performed by: STUDENT IN AN ORGANIZED HEALTH CARE EDUCATION/TRAINING PROGRAM

## 2022-09-22 PROCEDURE — 36415 COLL VENOUS BLD VENIPUNCTURE: CPT

## 2022-09-22 PROCEDURE — 71045 X-RAY EXAM CHEST 1 VIEW: CPT | Performed by: RADIOLOGY

## 2022-09-22 PROCEDURE — 71045 X-RAY EXAM CHEST 1 VIEW: CPT

## 2022-09-22 PROCEDURE — 99284 EMERGENCY DEPT VISIT MOD MDM: CPT

## 2022-09-22 PROCEDURE — 85730 THROMBOPLASTIN TIME PARTIAL: CPT | Performed by: STUDENT IN AN ORGANIZED HEALTH CARE EDUCATION/TRAINING PROGRAM

## 2022-09-22 PROCEDURE — 87636 SARSCOV2 & INF A&B AMP PRB: CPT | Performed by: STUDENT IN AN ORGANIZED HEALTH CARE EDUCATION/TRAINING PROGRAM

## 2022-09-22 PROCEDURE — 25010000002 IOPAMIDOL 61 % SOLUTION: Performed by: STUDENT IN AN ORGANIZED HEALTH CARE EDUCATION/TRAINING PROGRAM

## 2022-09-22 PROCEDURE — 80053 COMPREHEN METABOLIC PANEL: CPT | Performed by: STUDENT IN AN ORGANIZED HEALTH CARE EDUCATION/TRAINING PROGRAM

## 2022-09-22 PROCEDURE — 84484 ASSAY OF TROPONIN QUANT: CPT | Performed by: STUDENT IN AN ORGANIZED HEALTH CARE EDUCATION/TRAINING PROGRAM

## 2022-09-22 PROCEDURE — 74177 CT ABD & PELVIS W/CONTRAST: CPT

## 2022-09-22 PROCEDURE — 93010 ELECTROCARDIOGRAM REPORT: CPT | Performed by: INTERNAL MEDICINE

## 2022-09-22 PROCEDURE — 74177 CT ABD & PELVIS W/CONTRAST: CPT | Performed by: RADIOLOGY

## 2022-09-22 PROCEDURE — 85025 COMPLETE CBC W/AUTO DIFF WBC: CPT | Performed by: STUDENT IN AN ORGANIZED HEALTH CARE EDUCATION/TRAINING PROGRAM

## 2022-09-22 PROCEDURE — 83690 ASSAY OF LIPASE: CPT | Performed by: STUDENT IN AN ORGANIZED HEALTH CARE EDUCATION/TRAINING PROGRAM

## 2022-09-22 RX ORDER — SODIUM CHLORIDE 0.9 % (FLUSH) 0.9 %
10 SYRINGE (ML) INJECTION AS NEEDED
Status: DISCONTINUED | OUTPATIENT
Start: 2022-09-22 | End: 2022-09-22 | Stop reason: HOSPADM

## 2022-09-22 RX ORDER — NITROGLYCERIN 0.4 MG/1
0.4 TABLET SUBLINGUAL
Status: DISCONTINUED | OUTPATIENT
Start: 2022-09-22 | End: 2022-09-22 | Stop reason: HOSPADM

## 2022-09-22 RX ORDER — NITROGLYCERIN 0.4 MG/1
0.4 TABLET SUBLINGUAL ONCE
Status: COMPLETED | OUTPATIENT
Start: 2022-09-22 | End: 2022-09-22

## 2022-09-22 RX ORDER — IBUPROFEN 400 MG/1
400 TABLET ORAL ONCE
Status: COMPLETED | OUTPATIENT
Start: 2022-09-22 | End: 2022-09-22

## 2022-09-22 RX ADMIN — NITROGLYCERIN 0.4 MG: 0.4 TABLET, ORALLY DISINTEGRATING SUBLINGUAL at 15:49

## 2022-09-22 RX ADMIN — NITROGLYCERIN 0.4 MG: 0.4 TABLET, ORALLY DISINTEGRATING SUBLINGUAL at 16:46

## 2022-09-22 RX ADMIN — IOPAMIDOL 100 ML: 612 INJECTION, SOLUTION INTRAVENOUS at 15:16

## 2022-09-22 RX ADMIN — IBUPROFEN 400 MG: 400 TABLET, FILM COATED ORAL at 15:49

## 2022-09-22 RX ADMIN — SODIUM CHLORIDE 1000 ML: 9 INJECTION, SOLUTION INTRAVENOUS at 14:40

## 2022-09-26 ENCOUNTER — TRANSCRIBE ORDERS (OUTPATIENT)
Dept: ADMINISTRATIVE | Facility: HOSPITAL | Age: 72
End: 2022-09-26

## 2022-09-26 DIAGNOSIS — R07.9 CHEST PAIN, UNSPECIFIED TYPE: Primary | ICD-10-CM

## 2022-11-01 ENCOUNTER — LAB REQUISITION (OUTPATIENT)
Dept: LAB | Facility: HOSPITAL | Age: 72
End: 2022-11-01

## 2022-11-01 DIAGNOSIS — E11.9 TYPE 2 DIABETES MELLITUS WITHOUT COMPLICATIONS: ICD-10-CM

## 2022-11-01 LAB — HBA1C MFR BLD: 10.4 % (ref 4.8–5.6)

## 2022-11-01 PROCEDURE — 83036 HEMOGLOBIN GLYCOSYLATED A1C: CPT | Performed by: INTERNAL MEDICINE

## 2022-11-18 ENCOUNTER — APPOINTMENT (OUTPATIENT)
Dept: GENERAL RADIOLOGY | Facility: HOSPITAL | Age: 72
End: 2022-11-18

## 2022-11-18 ENCOUNTER — APPOINTMENT (OUTPATIENT)
Dept: CT IMAGING | Facility: HOSPITAL | Age: 72
End: 2022-11-18

## 2022-11-18 ENCOUNTER — HOSPITAL ENCOUNTER (EMERGENCY)
Facility: HOSPITAL | Age: 72
Discharge: NURSING, REHAB OR LONG-TERM CARE | End: 2022-11-18
Attending: EMERGENCY MEDICINE | Admitting: EMERGENCY MEDICINE

## 2022-11-18 VITALS
BODY MASS INDEX: 44.18 KG/M2 | SYSTOLIC BLOOD PRESSURE: 139 MMHG | WEIGHT: 258.8 LBS | TEMPERATURE: 98.9 F | HEIGHT: 64 IN | RESPIRATION RATE: 18 BRPM | OXYGEN SATURATION: 95 % | DIASTOLIC BLOOD PRESSURE: 63 MMHG | HEART RATE: 89 BPM

## 2022-11-18 DIAGNOSIS — J06.9 ACUTE URI: Primary | ICD-10-CM

## 2022-11-18 LAB
A-A DO2: 31.5 MMHG (ref 0–300)
ALBUMIN SERPL-MCNC: 3.9 G/DL (ref 3.5–5.2)
ALBUMIN/GLOB SERPL: 1 G/DL
ALP SERPL-CCNC: 134 U/L (ref 39–117)
ALT SERPL W P-5'-P-CCNC: 17 U/L (ref 1–33)
AMMONIA BLD-SCNC: 16 UMOL/L (ref 11–51)
AMPHET+METHAMPHET UR QL: NEGATIVE
AMPHETAMINES UR QL: NEGATIVE
ANION GAP SERPL CALCULATED.3IONS-SCNC: 15.1 MMOL/L (ref 5–15)
ARTERIAL PATENCY WRIST A: ABNORMAL
AST SERPL-CCNC: 22 U/L (ref 1–32)
ATMOSPHERIC PRESS: 733 MMHG
BARBITURATES UR QL SCN: NEGATIVE
BASE EXCESS BLDA CALC-SCNC: 7.4 MMOL/L (ref 0–2)
BASOPHILS # BLD AUTO: 0.08 10*3/MM3 (ref 0–0.2)
BASOPHILS NFR BLD AUTO: 0.3 % (ref 0–1.5)
BDY SITE: ABNORMAL
BENZODIAZ UR QL SCN: NEGATIVE
BILIRUB SERPL-MCNC: 1 MG/DL (ref 0–1.2)
BILIRUB UR QL STRIP: NEGATIVE
BODY TEMPERATURE: 0 C
BUN SERPL-MCNC: 16 MG/DL (ref 8–23)
BUN/CREAT SERPL: 15.4 (ref 7–25)
BUPRENORPHINE SERPL-MCNC: NEGATIVE NG/ML
CALCIUM SPEC-SCNC: 9.7 MG/DL (ref 8.6–10.5)
CANNABINOIDS SERPL QL: NEGATIVE
CHLORIDE SERPL-SCNC: 92 MMOL/L (ref 98–107)
CLARITY UR: CLEAR
CO2 BLDA-SCNC: 33.6 MMOL/L (ref 22–33)
CO2 SERPL-SCNC: 27.9 MMOL/L (ref 22–29)
COCAINE UR QL: NEGATIVE
COHGB MFR BLD: 0.3 % (ref 0–5)
COLOR UR: YELLOW
CREAT BLDA-MCNC: 0.9 MG/DL (ref 0.6–1.3)
CREAT SERPL-MCNC: 1.04 MG/DL (ref 0.57–1)
CRP SERPL-MCNC: 2.36 MG/DL (ref 0–0.5)
D-LACTATE SERPL-SCNC: 1.9 MMOL/L (ref 0.5–2)
D-LACTATE SERPL-SCNC: 2.5 MMOL/L (ref 0.5–2)
D-LACTATE SERPL-SCNC: 2.9 MMOL/L (ref 0.5–2)
D-LACTATE SERPL-SCNC: 3.7 MMOL/L (ref 0.5–2)
DEPRECATED RDW RBC AUTO: 42.5 FL (ref 37–54)
EGFRCR SERPLBLD CKD-EPI 2021: 56.5 ML/MIN/1.73
EOSINOPHIL # BLD AUTO: 0.03 10*3/MM3 (ref 0–0.4)
EOSINOPHIL NFR BLD AUTO: 0.1 % (ref 0.3–6.2)
ERYTHROCYTE [DISTWIDTH] IN BLOOD BY AUTOMATED COUNT: 12.9 % (ref 12.3–15.4)
FLUAV RNA RESP QL NAA+PROBE: NOT DETECTED
FLUBV RNA RESP QL NAA+PROBE: NOT DETECTED
GLOBULIN UR ELPH-MCNC: 3.8 GM/DL
GLUCOSE SERPL-MCNC: 319 MG/DL (ref 65–99)
GLUCOSE UR STRIP-MCNC: ABNORMAL MG/DL
HCO3 BLDA-SCNC: 32.2 MMOL/L (ref 20–26)
HCT VFR BLD AUTO: 42.2 % (ref 34–46.6)
HCT VFR BLD CALC: 40 % (ref 38–51)
HGB BLD-MCNC: 13.7 G/DL (ref 12–15.9)
HGB BLDA-MCNC: 13.1 G/DL (ref 13.5–17.5)
HGB UR QL STRIP.AUTO: NEGATIVE
HOLD SPECIMEN: NORMAL
HOLD SPECIMEN: NORMAL
IMM GRANULOCYTES # BLD AUTO: 0.19 10*3/MM3 (ref 0–0.05)
IMM GRANULOCYTES NFR BLD AUTO: 0.8 % (ref 0–0.5)
INHALED O2 CONCENTRATION: 21 %
INR PPP: 1.03 (ref 0.9–1.1)
KETONES UR QL STRIP: NEGATIVE
LEUKOCYTE ESTERASE UR QL STRIP.AUTO: NEGATIVE
LIPASE SERPL-CCNC: 17 U/L (ref 13–60)
LYMPHOCYTES # BLD AUTO: 1.23 10*3/MM3 (ref 0.7–3.1)
LYMPHOCYTES NFR BLD AUTO: 5.3 % (ref 19.6–45.3)
Lab: ABNORMAL
MAGNESIUM SERPL-MCNC: 1.5 MG/DL (ref 1.6–2.4)
MCH RBC QN AUTO: 29.5 PG (ref 26.6–33)
MCHC RBC AUTO-ENTMCNC: 32.5 G/DL (ref 31.5–35.7)
MCV RBC AUTO: 90.9 FL (ref 79–97)
METHADONE UR QL SCN: NEGATIVE
METHGB BLD QL: 0 % (ref 0–3)
MODALITY: ABNORMAL
MONOCYTES # BLD AUTO: 1.32 10*3/MM3 (ref 0.1–0.9)
MONOCYTES NFR BLD AUTO: 5.6 % (ref 5–12)
NEUTROPHILS NFR BLD AUTO: 20.56 10*3/MM3 (ref 1.7–7)
NEUTROPHILS NFR BLD AUTO: 87.9 % (ref 42.7–76)
NITRITE UR QL STRIP: NEGATIVE
NOTE: ABNORMAL
NRBC BLD AUTO-RTO: 0 /100 WBC (ref 0–0.2)
NT-PROBNP SERPL-MCNC: 442.1 PG/ML (ref 0–900)
OPIATES UR QL: NEGATIVE
OXYCODONE UR QL SCN: NEGATIVE
OXYHGB MFR BLDV: 91 % (ref 94–99)
PCO2 BLDA: 44.7 MM HG (ref 35–45)
PCO2 TEMP ADJ BLD: ABNORMAL MM[HG]
PCP UR QL SCN: NEGATIVE
PH BLDA: 7.47 PH UNITS (ref 7.35–7.45)
PH UR STRIP.AUTO: 5.5 [PH] (ref 5–8)
PH, TEMP CORRECTED: ABNORMAL
PLATELET # BLD AUTO: 298 10*3/MM3 (ref 140–450)
PMV BLD AUTO: 8.5 FL (ref 6–12)
PO2 BLDA: 62.2 MM HG (ref 83–108)
PO2 TEMP ADJ BLD: ABNORMAL MM[HG]
POTASSIUM SERPL-SCNC: 4 MMOL/L (ref 3.5–5.2)
PROCALCITONIN SERPL-MCNC: 1.24 NG/ML (ref 0–0.25)
PROPOXYPH UR QL: NEGATIVE
PROT SERPL-MCNC: 7.7 G/DL (ref 6–8.5)
PROT UR QL STRIP: NEGATIVE
PROTHROMBIN TIME: 13.7 SECONDS (ref 12.1–14.7)
QT INTERVAL: 390 MS
QTC INTERVAL: 479 MS
RBC # BLD AUTO: 4.64 10*6/MM3 (ref 3.77–5.28)
SAO2 % BLDCOA: 91.3 % (ref 94–99)
SARS-COV-2 RNA RESP QL NAA+PROBE: NOT DETECTED
SODIUM SERPL-SCNC: 135 MMOL/L (ref 136–145)
SP GR UR STRIP: >1.03 (ref 1–1.03)
TRICYCLICS UR QL SCN: NEGATIVE
TROPONIN T SERPL-MCNC: <0.01 NG/ML (ref 0–0.03)
TSH SERPL DL<=0.05 MIU/L-ACNC: 2.57 UIU/ML (ref 0.27–4.2)
UROBILINOGEN UR QL STRIP: ABNORMAL
VENTILATOR MODE: ABNORMAL
WBC NRBC COR # BLD: 23.41 10*3/MM3 (ref 3.4–10.8)
WHOLE BLOOD HOLD COAG: NORMAL
WHOLE BLOOD HOLD SPECIMEN: NORMAL

## 2022-11-18 PROCEDURE — 0042T HC CT CEREBRAL PERFUSION W/WO CONTRAST: CPT

## 2022-11-18 PROCEDURE — 70498 CT ANGIOGRAPHY NECK: CPT | Performed by: RADIOLOGY

## 2022-11-18 PROCEDURE — 70496 CT ANGIOGRAPHY HEAD: CPT

## 2022-11-18 PROCEDURE — 82565 ASSAY OF CREATININE: CPT

## 2022-11-18 PROCEDURE — 83605 ASSAY OF LACTIC ACID: CPT | Performed by: EMERGENCY MEDICINE

## 2022-11-18 PROCEDURE — 86140 C-REACTIVE PROTEIN: CPT | Performed by: EMERGENCY MEDICINE

## 2022-11-18 PROCEDURE — 93010 ELECTROCARDIOGRAM REPORT: CPT | Performed by: INTERNAL MEDICINE

## 2022-11-18 PROCEDURE — 51701 INSERT BLADDER CATHETER: CPT

## 2022-11-18 PROCEDURE — 84484 ASSAY OF TROPONIN QUANT: CPT | Performed by: EMERGENCY MEDICINE

## 2022-11-18 PROCEDURE — 87150 DNA/RNA AMPLIFIED PROBE: CPT | Performed by: EMERGENCY MEDICINE

## 2022-11-18 PROCEDURE — 80053 COMPREHEN METABOLIC PANEL: CPT | Performed by: EMERGENCY MEDICINE

## 2022-11-18 PROCEDURE — 87040 BLOOD CULTURE FOR BACTERIA: CPT | Performed by: EMERGENCY MEDICINE

## 2022-11-18 PROCEDURE — 99285 EMERGENCY DEPT VISIT HI MDM: CPT

## 2022-11-18 PROCEDURE — 71045 X-RAY EXAM CHEST 1 VIEW: CPT | Performed by: RADIOLOGY

## 2022-11-18 PROCEDURE — 25010000002 VANCOMYCIN 5 G RECONSTITUTED SOLUTION: Performed by: EMERGENCY MEDICINE

## 2022-11-18 PROCEDURE — 83690 ASSAY OF LIPASE: CPT | Performed by: EMERGENCY MEDICINE

## 2022-11-18 PROCEDURE — 81003 URINALYSIS AUTO W/O SCOPE: CPT | Performed by: EMERGENCY MEDICINE

## 2022-11-18 PROCEDURE — 71045 X-RAY EXAM CHEST 1 VIEW: CPT

## 2022-11-18 PROCEDURE — 85610 PROTHROMBIN TIME: CPT | Performed by: RADIOLOGY

## 2022-11-18 PROCEDURE — 96366 THER/PROPH/DIAG IV INF ADDON: CPT

## 2022-11-18 PROCEDURE — 70450 CT HEAD/BRAIN W/O DYE: CPT

## 2022-11-18 PROCEDURE — 82805 BLOOD GASES W/O2 SATURATION: CPT

## 2022-11-18 PROCEDURE — 96361 HYDRATE IV INFUSION ADD-ON: CPT

## 2022-11-18 PROCEDURE — 96365 THER/PROPH/DIAG IV INF INIT: CPT

## 2022-11-18 PROCEDURE — 96367 TX/PROPH/DG ADDL SEQ IV INF: CPT

## 2022-11-18 PROCEDURE — 93005 ELECTROCARDIOGRAM TRACING: CPT | Performed by: EMERGENCY MEDICINE

## 2022-11-18 PROCEDURE — 85025 COMPLETE CBC W/AUTO DIFF WBC: CPT | Performed by: EMERGENCY MEDICINE

## 2022-11-18 PROCEDURE — 87077 CULTURE AEROBIC IDENTIFY: CPT | Performed by: EMERGENCY MEDICINE

## 2022-11-18 PROCEDURE — 87636 SARSCOV2 & INF A&B AMP PRB: CPT | Performed by: EMERGENCY MEDICINE

## 2022-11-18 PROCEDURE — 25010000002 CEFTRIAXONE PER 250 MG: Performed by: EMERGENCY MEDICINE

## 2022-11-18 PROCEDURE — 82140 ASSAY OF AMMONIA: CPT | Performed by: EMERGENCY MEDICINE

## 2022-11-18 PROCEDURE — 87186 SC STD MICRODIL/AGAR DIL: CPT | Performed by: EMERGENCY MEDICINE

## 2022-11-18 PROCEDURE — 36600 WITHDRAWAL OF ARTERIAL BLOOD: CPT

## 2022-11-18 PROCEDURE — 83880 ASSAY OF NATRIURETIC PEPTIDE: CPT | Performed by: EMERGENCY MEDICINE

## 2022-11-18 PROCEDURE — 82375 ASSAY CARBOXYHB QUANT: CPT

## 2022-11-18 PROCEDURE — 70496 CT ANGIOGRAPHY HEAD: CPT | Performed by: RADIOLOGY

## 2022-11-18 PROCEDURE — 84443 ASSAY THYROID STIM HORMONE: CPT | Performed by: EMERGENCY MEDICINE

## 2022-11-18 PROCEDURE — 70498 CT ANGIOGRAPHY NECK: CPT

## 2022-11-18 PROCEDURE — 84145 PROCALCITONIN (PCT): CPT | Performed by: EMERGENCY MEDICINE

## 2022-11-18 PROCEDURE — 74176 CT ABD & PELVIS W/O CONTRAST: CPT

## 2022-11-18 PROCEDURE — 83050 HGB METHEMOGLOBIN QUAN: CPT

## 2022-11-18 PROCEDURE — 80306 DRUG TEST PRSMV INSTRMNT: CPT | Performed by: EMERGENCY MEDICINE

## 2022-11-18 PROCEDURE — 36415 COLL VENOUS BLD VENIPUNCTURE: CPT

## 2022-11-18 PROCEDURE — 0 IOPAMIDOL PER 1 ML: Performed by: EMERGENCY MEDICINE

## 2022-11-18 PROCEDURE — 71250 CT THORAX DX C-: CPT

## 2022-11-18 PROCEDURE — 0042T CT CEREBRAL PERFUSION W WO CONTRAST: CPT | Performed by: RADIOLOGY

## 2022-11-18 PROCEDURE — 83735 ASSAY OF MAGNESIUM: CPT | Performed by: EMERGENCY MEDICINE

## 2022-11-18 RX ORDER — SODIUM CHLORIDE 0.9 % (FLUSH) 0.9 %
10 SYRINGE (ML) INJECTION AS NEEDED
Status: CANCELLED | OUTPATIENT
Start: 2022-11-18

## 2022-11-18 RX ORDER — CEFDINIR 300 MG/1
300 CAPSULE ORAL 2 TIMES DAILY
Qty: 18 CAPSULE | Refills: 0 | Status: SHIPPED | OUTPATIENT
Start: 2022-11-18 | End: 2022-11-19 | Stop reason: SDUPTHER

## 2022-11-18 RX ORDER — SODIUM CHLORIDE 9 MG/ML
125 INJECTION, SOLUTION INTRAVENOUS CONTINUOUS
Status: DISCONTINUED | OUTPATIENT
Start: 2022-11-18 | End: 2022-11-18 | Stop reason: HOSPADM

## 2022-11-18 RX ORDER — SODIUM CHLORIDE 0.9 % (FLUSH) 0.9 %
10 SYRINGE (ML) INJECTION AS NEEDED
Status: DISCONTINUED | OUTPATIENT
Start: 2022-11-18 | End: 2022-11-18 | Stop reason: HOSPADM

## 2022-11-18 RX ORDER — CEFDINIR 300 MG/1
300 CAPSULE ORAL 2 TIMES DAILY
Qty: 18 CAPSULE | Refills: 0 | Status: SHIPPED | OUTPATIENT
Start: 2022-11-18 | End: 2022-11-18 | Stop reason: SDUPTHER

## 2022-11-18 RX ADMIN — SODIUM CHLORIDE 125 ML/HR: 9 INJECTION, SOLUTION INTRAVENOUS at 09:07

## 2022-11-18 RX ADMIN — VANCOMYCIN HYDROCHLORIDE 2250 MG: 5 INJECTION, POWDER, LYOPHILIZED, FOR SOLUTION INTRAVENOUS at 11:35

## 2022-11-18 RX ADMIN — IOPAMIDOL 130 ML: 755 INJECTION, SOLUTION INTRAVENOUS at 08:06

## 2022-11-18 RX ADMIN — SODIUM CHLORIDE 1000 ML: 9 INJECTION, SOLUTION INTRAVENOUS at 09:07

## 2022-11-18 RX ADMIN — SODIUM CHLORIDE 2 G: 9 INJECTION, SOLUTION INTRAVENOUS at 09:07

## 2022-11-18 NOTE — ED PROVIDER NOTES
Subjective   History of Present Illness  Patient is a 72-year-old female sent from a local nursing home.  They report that this morning she was difficult to awaken and then when she did awaken she was unsteady on her feet, not able to walk without a great deal of assistance.  They report that normally she ambulates without assistance.  The patient's complaint is that they woke her up and sent her to the hospital and that she has not had her morning coffee.  The patient's daughter is here, she reports that the patient has dementia.  She reports that the patient appears to be completely at baseline neurologically/mentally to her.        Review of Systems   Unable to perform ROS: Dementia   Musculoskeletal: Positive for neck pain (Patient does report pain in the back of her neck, states that she always has pain in the back of her neck and that this is not any different than usual).   All other systems reviewed and are negative.      Past Medical History:   Diagnosis Date   • COPD (chronic obstructive pulmonary disease) ([facility])    • Dementia ([facility])    • Diverticulosis    • Essential hypertension    • GERD (gastroesophageal reflux disease)    • Osteoporosis    • Paroxysmal atrial fibrillation (HCC)    • Type II diabetes mellitus (HCC)        No Known Allergies    Past Surgical History:   Procedure Laterality Date   • APPENDECTOMY     • CHOLECYSTECTOMY     • TONSILLECTOMY         Family History   Problem Relation Age of Onset   • Cerebral aneurysm Mother    • Heart failure Father    • Diabetes Father        Social History     Socioeconomic History   • Marital status:    Tobacco Use   • Smoking status: Never   • Smokeless tobacco: Never   Substance and Sexual Activity   • Alcohol use: Never   • Drug use: Never   • Sexual activity: Defer           Objective   Physical Exam  Vitals and nursing note reviewed.   Constitutional:       General: She is not in acute distress.     Appearance: Normal appearance. She is  well-developed. She is not toxic-appearing or diaphoretic.      Comments: Chronically ill and demented appearing female, awake and alert, in no apparent acute distress.  She is oriented to person and to hospital, she tells me that she is in [place] and the year is 2002.   HENT:      Head: Normocephalic and atraumatic.   Eyes:      General: No scleral icterus.     Pupils: Pupils are equal, round, and reactive to light.   Neck:      Trachea: No tracheal deviation.      Meningeal: Brudzinski's sign and Kernig's sign absent.   Cardiovascular:      Rate and Rhythm: Normal rate and regular rhythm.   Pulmonary:      Effort: Pulmonary effort is normal. No respiratory distress.      Breath sounds: Normal breath sounds.   Chest:      Chest wall: No tenderness.   Abdominal:      General: Bowel sounds are normal.      Palpations: Abdomen is soft.      Tenderness: There is no abdominal tenderness. There is no guarding or rebound.   Musculoskeletal:         General: No tenderness. Normal range of motion.      Cervical back: Normal range of motion and neck supple. No rigidity or tenderness.      Right lower leg: No edema.      Left lower leg: No edema.   Skin:     General: Skin is warm and dry.      Capillary Refill: Capillary refill takes less than 2 seconds.      Coloration: Skin is not pale.   Neurological:      General: No focal deficit present.      Mental Status: She is alert and oriented to person, place, and time.      GCS: GCS eye subscore is 4. GCS verbal subscore is 5. GCS motor subscore is 6.      Motor: No abnormal muscle tone.   Psychiatric:         Mood and Affect: Mood normal.         Behavior: Behavior normal.         Procedures  EKG shows sinus rhythm, rate rate 91.  IA interval 156, QRS duration 86, QTc 479 ms.  No apparent acute ischemia.  No evidence for STEMI.  CT Abdomen Pelvis Without Contrast   Final Result   No acute or inflammatory changes are seen in the abdomen or pelvis. No changes are noted since  the previous scan in September.               Signer Name: Deven Carlisle MD    Signed: 11/18/2022 5:49 PM    Workstation Name: [facility]     Radiology Psychiatric      CT Chest Without Contrast Diagnostic   Final Result   Negative except for thoracic disc disease. No acute findings.      Signer Name: Deven Carlisle MD    Signed: 11/18/2022 5:47 PM    Workstation Name: [facility]      XR Chest 1 View   Final Result     Unremarkable exam. No acute cardiopulmonary findings identified.       This report was finalized on 11/18/2022 9:17 AM by Dr. Oscar Pollard MD.          CT CEREBRAL PERFUSION WITH & WITHOUT CONTRAST   Final Result   No core infarct detected.       This report was finalized on 11/18/2022 8:26 AM by Dr. Oscar Pollard MD.          CT Angiogram Carotids   Final Result     Mild bilateral proximal ICA atherosclerotic vascular calcification   with less than 25% stenosis.       This report was finalized on 11/18/2022 8:28 AM by Dr. Oscar Pollard MD.          CT Angiogram Head w AI Analysis of LVO   Final Result     No acute findings in the arteries of the head/brain.       This report was finalized on 11/18/2022 8:27 AM by Dr. Oscar Pollard MD.          CT Head Without Contrast   Final Result   1.  Small vessel ischemic/degenerative changes.   2.  Cerebral and cerebellar atrophy.       This report was finalized on 11/18/2022 8:26 AM by Dr. Oscar Pollard MD.                     ED Course  ED Course as of 11/19/22 0844   Fri Nov 18, 2022   1109 Work-up thus far is not showing us a source of infection.  Patient's daughter states that she does complain of neck pain chronically and daily and that this is not at all unusual.  However, I feel that it is prudent to do a lumbar puncture in the situation.  Patient's daughter agrees and has given verbal consent, will give written consent as well.  I discussed the case with radiologist Dr. Pollard.  He will perform IR  lumbar puncture. [CM]   1341 Radiology was unable to do the lumbar puncture as the patient was unable to tolerate lying prone. [CM]   1628 Patient's daughter really does not want her to have a lumbar puncture, does not feel that is necessary.  Clinically the patient shows no signs of meningitis.  Daughter has concerns for possible aspiration, as the patient does have known esophageal dysmotility and has been coughing quite a bit today.  She requested I do a CT of her chest to further assess for this possibility, I have ordered this.  As well as abdomen and pelvis.  Patient appears comfortable, she is doing well, she and her daughter are watching a movie. [CM]   1831 Patient's emergency department stay has been uneventful.  She has shown no signs of acute distress.  She has been completely at her baseline mental status per the daughter.  The daughter does not want the patient to have a lumbar puncture, the patient does not want to have a lumbar puncture.  Clinically I do not feel that an LP is absolutely necessary.  Patient and daughter want for her to return to the nursing home, daughter states that she gets bronchitis a lot and she wants her to have antibiotics for that.  Daughter will keep close tabs on her at the nursing home and have her brought back to the emergency department immediately if her symptoms worsen or she has any problems. [CM]      ED Course User Index  [CM] Colt Jo MD                                           [facility]    Final diagnoses:   Acute URI       ED Disposition  ED Disposition     ED Disposition   Discharge    Condition   Stable    Comment   --             Jna Hammonds MD  [address] [facility] [address]  [phone]    Go in 3 days      [facility] Emergency Department  [address] [facility] [id number] [phone]  Go to   If symptoms worsen         Medication List      New Prescriptions    cefdinir 300 MG capsule  Commonly known as:  OMNICEF  Take 1 capsule by mouth 2 (Two) Times a Day for 9 days. Starting tomorrow morning, Sunday, November 19, 2022           Where to Get Your Medications      You can get these medications from any pharmacy    Bring a paper prescription for each of these medications  · cefdinir 300 MG capsule       Please note that portions of this note were completed with a voice recognition program.        Colt Jo MD  11/19/22 3019    Addendum.  Patient has abnormal blood cultures reported this morning, nursing home has been notified to send her back to the emergency department.       Colt Jo MD  11/19/22 4785

## 2022-11-18 NOTE — ED NOTES
Dulce Quintero, patient daughter/POA, signed consent form for IR Lumbar Puncture. Consent form placed with patient labels and placed on patient chart.

## 2022-11-18 NOTE — NURSING NOTE
Patient transported to radiology for lumbar puncture via stretcher by rad RN. Patient unable to lie prone on xray table for procedure. Dr. Pollard made aware. Patient transported back to ED room by rad RN. Report given to primary RN, Sheela, and Dr. Jo.  
Yes

## 2022-11-18 NOTE — DISCHARGE INSTRUCTIONS
Home to nursing home.  Follow-up with Dr. Hammonds Monday at the nursing home.  Return to the emergency department immediately if symptoms worsen/any problems.

## 2022-11-19 ENCOUNTER — APPOINTMENT (OUTPATIENT)
Dept: GENERAL RADIOLOGY | Facility: HOSPITAL | Age: 72
End: 2022-11-19

## 2022-11-19 ENCOUNTER — HOSPITAL ENCOUNTER (EMERGENCY)
Facility: HOSPITAL | Age: 72
Discharge: NURSING, REHAB OR LONG-TERM CARE | End: 2022-11-19
Attending: EMERGENCY MEDICINE | Admitting: EMERGENCY MEDICINE

## 2022-11-19 VITALS
WEIGHT: 250 LBS | DIASTOLIC BLOOD PRESSURE: 64 MMHG | RESPIRATION RATE: 18 BRPM | HEIGHT: 63 IN | HEART RATE: 79 BPM | TEMPERATURE: 98.5 F | BODY MASS INDEX: 44.3 KG/M2 | OXYGEN SATURATION: 95 % | SYSTOLIC BLOOD PRESSURE: 107 MMHG

## 2022-11-19 DIAGNOSIS — B96.1 BACTEREMIA DUE TO KLEBSIELLA PNEUMONIAE: Primary | ICD-10-CM

## 2022-11-19 DIAGNOSIS — R78.81 BACTEREMIA DUE TO KLEBSIELLA PNEUMONIAE: Primary | ICD-10-CM

## 2022-11-19 LAB
ALBUMIN SERPL-MCNC: 3.52 G/DL (ref 3.5–5.2)
ALBUMIN/GLOB SERPL: 1 G/DL
ALP SERPL-CCNC: 118 U/L (ref 39–117)
ALT SERPL W P-5'-P-CCNC: 17 U/L (ref 1–33)
ANION GAP SERPL CALCULATED.3IONS-SCNC: 12.3 MMOL/L (ref 5–15)
AST SERPL-CCNC: 22 U/L (ref 1–32)
BACTERIA BLD CULT: ABNORMAL
BACTERIA ID TEST ISLT QL CULT: ABNORMAL
BASOPHILS # BLD AUTO: 0.04 10*3/MM3 (ref 0–0.2)
BASOPHILS NFR BLD AUTO: 0.3 % (ref 0–1.5)
BILIRUB SERPL-MCNC: 0.5 MG/DL (ref 0–1.2)
BILIRUB UR QL STRIP: NEGATIVE
BUN SERPL-MCNC: 13 MG/DL (ref 8–23)
BUN/CREAT SERPL: 16 (ref 7–25)
CALCIUM SPEC-SCNC: 9.1 MG/DL (ref 8.6–10.5)
CHLORIDE SERPL-SCNC: 98 MMOL/L (ref 98–107)
CLARITY UR: CLEAR
CO2 SERPL-SCNC: 28.7 MMOL/L (ref 22–29)
COLOR UR: YELLOW
CREAT SERPL-MCNC: 0.81 MG/DL (ref 0.57–1)
CRP SERPL-MCNC: 12.27 MG/DL (ref 0–0.5)
D-LACTATE SERPL-SCNC: 1.6 MMOL/L (ref 0.5–2)
D-LACTATE SERPL-SCNC: 2.1 MMOL/L (ref 0.5–2)
DEPRECATED RDW RBC AUTO: 45.9 FL (ref 37–54)
EGFRCR SERPLBLD CKD-EPI 2021: 76.3 ML/MIN/1.73
EOSINOPHIL # BLD AUTO: 0.26 10*3/MM3 (ref 0–0.4)
EOSINOPHIL NFR BLD AUTO: 2.3 % (ref 0.3–6.2)
ERYTHROCYTE [DISTWIDTH] IN BLOOD BY AUTOMATED COUNT: 13.3 % (ref 12.3–15.4)
ERYTHROCYTE [SEDIMENTATION RATE] IN BLOOD: 74 MM/HR (ref 0–30)
GLOBULIN UR ELPH-MCNC: 3.6 GM/DL
GLUCOSE SERPL-MCNC: 286 MG/DL (ref 65–99)
GLUCOSE UR STRIP-MCNC: ABNORMAL MG/DL
HCT VFR BLD AUTO: 37.4 % (ref 34–46.6)
HGB BLD-MCNC: 12.2 G/DL (ref 12–15.9)
HGB UR QL STRIP.AUTO: NEGATIVE
HOLD SPECIMEN: NORMAL
HOLD SPECIMEN: NORMAL
IMM GRANULOCYTES # BLD AUTO: 0.03 10*3/MM3 (ref 0–0.05)
IMM GRANULOCYTES NFR BLD AUTO: 0.3 % (ref 0–0.5)
KETONES UR QL STRIP: NEGATIVE
LEUKOCYTE ESTERASE UR QL STRIP.AUTO: NEGATIVE
LYMPHOCYTES # BLD AUTO: 2.31 10*3/MM3 (ref 0.7–3.1)
LYMPHOCYTES NFR BLD AUTO: 20.1 % (ref 19.6–45.3)
MCH RBC QN AUTO: 30.7 PG (ref 26.6–33)
MCHC RBC AUTO-ENTMCNC: 32.6 G/DL (ref 31.5–35.7)
MCV RBC AUTO: 94 FL (ref 79–97)
MONOCYTES # BLD AUTO: 0.77 10*3/MM3 (ref 0.1–0.9)
MONOCYTES NFR BLD AUTO: 6.7 % (ref 5–12)
NEUTROPHILS NFR BLD AUTO: 70.3 % (ref 42.7–76)
NEUTROPHILS NFR BLD AUTO: 8.06 10*3/MM3 (ref 1.7–7)
NITRITE UR QL STRIP: NEGATIVE
NRBC BLD AUTO-RTO: 0 /100 WBC (ref 0–0.2)
PH UR STRIP.AUTO: 6 [PH] (ref 5–8)
PLATELET # BLD AUTO: 235 10*3/MM3 (ref 140–450)
PMV BLD AUTO: 8.2 FL (ref 6–12)
POTASSIUM SERPL-SCNC: 3.5 MMOL/L (ref 3.5–5.2)
PROT SERPL-MCNC: 7.1 G/DL (ref 6–8.5)
PROT UR QL STRIP: NEGATIVE
RBC # BLD AUTO: 3.98 10*6/MM3 (ref 3.77–5.28)
SODIUM SERPL-SCNC: 139 MMOL/L (ref 136–145)
SP GR UR STRIP: 1.01 (ref 1–1.03)
TROPONIN T SERPL-MCNC: <0.01 NG/ML (ref 0–0.03)
UROBILINOGEN UR QL STRIP: ABNORMAL
WBC NRBC COR # BLD: 11.47 10*3/MM3 (ref 3.4–10.8)
WHOLE BLOOD HOLD COAG: NORMAL
WHOLE BLOOD HOLD SPECIMEN: NORMAL

## 2022-11-19 PROCEDURE — 93010 ELECTROCARDIOGRAM REPORT: CPT | Performed by: INTERNAL MEDICINE

## 2022-11-19 PROCEDURE — 80053 COMPREHEN METABOLIC PANEL: CPT | Performed by: EMERGENCY MEDICINE

## 2022-11-19 PROCEDURE — 86140 C-REACTIVE PROTEIN: CPT | Performed by: EMERGENCY MEDICINE

## 2022-11-19 PROCEDURE — 85652 RBC SED RATE AUTOMATED: CPT | Performed by: EMERGENCY MEDICINE

## 2022-11-19 PROCEDURE — 93005 ELECTROCARDIOGRAM TRACING: CPT | Performed by: EMERGENCY MEDICINE

## 2022-11-19 PROCEDURE — 25010000002 CEFTRIAXONE PER 250 MG: Performed by: EMERGENCY MEDICINE

## 2022-11-19 PROCEDURE — 81003 URINALYSIS AUTO W/O SCOPE: CPT | Performed by: EMERGENCY MEDICINE

## 2022-11-19 PROCEDURE — 83605 ASSAY OF LACTIC ACID: CPT | Performed by: EMERGENCY MEDICINE

## 2022-11-19 PROCEDURE — 84484 ASSAY OF TROPONIN QUANT: CPT | Performed by: EMERGENCY MEDICINE

## 2022-11-19 PROCEDURE — 96365 THER/PROPH/DIAG IV INF INIT: CPT

## 2022-11-19 PROCEDURE — 96361 HYDRATE IV INFUSION ADD-ON: CPT

## 2022-11-19 PROCEDURE — 99284 EMERGENCY DEPT VISIT MOD MDM: CPT

## 2022-11-19 PROCEDURE — 36415 COLL VENOUS BLD VENIPUNCTURE: CPT

## 2022-11-19 PROCEDURE — P9612 CATHETERIZE FOR URINE SPEC: HCPCS

## 2022-11-19 PROCEDURE — 71045 X-RAY EXAM CHEST 1 VIEW: CPT

## 2022-11-19 PROCEDURE — 87040 BLOOD CULTURE FOR BACTERIA: CPT | Performed by: EMERGENCY MEDICINE

## 2022-11-19 PROCEDURE — 85025 COMPLETE CBC W/AUTO DIFF WBC: CPT | Performed by: EMERGENCY MEDICINE

## 2022-11-19 RX ORDER — SODIUM CHLORIDE 9 MG/ML
125 INJECTION, SOLUTION INTRAVENOUS CONTINUOUS
Status: DISCONTINUED | OUTPATIENT
Start: 2022-11-19 | End: 2022-11-19 | Stop reason: HOSPADM

## 2022-11-19 RX ORDER — CEFDINIR 300 MG/1
300 CAPSULE ORAL 2 TIMES DAILY
Qty: 6 CAPSULE | Refills: 0 | Status: SHIPPED | OUTPATIENT
Start: 2022-11-19 | End: 2022-12-01

## 2022-11-19 RX ADMIN — SODIUM CHLORIDE 125 ML/HR: 9 INJECTION, SOLUTION INTRAVENOUS at 13:20

## 2022-11-19 RX ADMIN — SODIUM CHLORIDE 500 ML: 9 INJECTION, SOLUTION INTRAVENOUS at 13:19

## 2022-11-19 RX ADMIN — CEFTRIAXONE 2 G: 2 INJECTION, POWDER, FOR SOLUTION INTRAMUSCULAR; INTRAVENOUS at 15:28

## 2022-11-19 NOTE — DISCHARGE INSTRUCTIONS
Return home to nursing home.  I am writing 3 additional days of Omnicef 300 mg twice daily, in addition to the 9 days that I wrote yesterday evening.  She had Rocephin yesterday and today, this will make a total of 14 days of antibiotic therapy.  Follow-up closely with Dr. Hammonds at the nursing home on Monday.  Return to the emergency department right away if symptoms worsen/any problems.

## 2022-11-19 NOTE — ED PROVIDER NOTES
Subjective   History of Present Illness  Patient is a 72-year-old female whom I saw here yesterday; she was called back to the emergency department today for a positive blood culture from yesterday, showing Klebsiella pneumoniae species (initially reported as Enterobacter but then this was amended to say Klebsiella).  Patient reports that she is feeling well today, has not been feeling ill in any way, having a good day.  Patient chronically has neck pain, she states that her neck has felt good all day today.  She denies fever, chills, chest pain, shortness of breath, abdominal pain, vomiting, diarrhea, syncope or near syncope, focal numbness or weakness, other symptoms or other complaints.  Her daughter has not yet arrived today.  Yesterday she had IV vancomycin and Rocephin, returns to the nursing home on p.o. Omnicef.        Review of Systems   All other systems reviewed and are negative.      Past Medical History:   Diagnosis Date   • COPD (chronic obstructive pulmonary disease) ([facility])    • Dementia ([facility])    • Diverticulosis    • Essential hypertension    • GERD (gastroesophageal reflux disease)    • Osteoporosis    • Paroxysmal atrial fibrillation ([facility])    • Type II diabetes mellitus ([facility])        No Known Allergies    Past Surgical History:   Procedure Laterality Date   • APPENDECTOMY     • CHOLECYSTECTOMY     • TONSILLECTOMY         Family History   Problem Relation Age of Onset   • Cerebral aneurysm Mother    • Heart failure Father    • Diabetes Father        Social History     Socioeconomic History   • Marital status:    Tobacco Use   • Smoking status: Never   • Smokeless tobacco: Never   Substance and Sexual Activity   • Alcohol use: Never   • Drug use: Never   • Sexual activity: Defer           Objective   Physical Exam  Vitals and nursing note reviewed.   Constitutional:       General: She is not in acute distress.     Appearance: She is well-developed. She is not toxic-appearing or diaphoretic.       Comments: Obese female, awake and alert.  She is oriented to person and to hospital, as well as to situation.  She states that she does not know what hospital she is in, states that she lives in [facility].  She states that the year is 2002.  Yesterday the daughter told me that they had recently moved to our area from [place], that the patient has dementia, and that this is her usual, baseline mental status.   HENT:      Head: Normocephalic and atraumatic.   Eyes:      General: No scleral icterus.     Pupils: Pupils are equal, round, and reactive to light.   Neck:      Trachea: No tracheal deviation.   Cardiovascular:      Rate and Rhythm: Normal rate and regular rhythm.   Pulmonary:      Effort: Pulmonary effort is normal. No respiratory distress.      Breath sounds: Normal breath sounds.   Chest:      Chest wall: No tenderness.   Abdominal:      General: Bowel sounds are normal.      Palpations: Abdomen is soft.      Tenderness: There is no abdominal tenderness. There is no guarding or rebound.      Comments: There is suprapubic tenderness to palpation, patient states that her bladder is full and she needs to urinate, states that she has not been having any abdominal pain.  No other tenderness.  No acute peritoneal signs.   Musculoskeletal:         General: No tenderness. Normal range of motion.      Cervical back: Normal range of motion and neck supple. No rigidity or tenderness.   Skin:     General: Skin is warm and dry.      Capillary Refill: Capillary refill takes less than 2 seconds.      Coloration: Skin is not pale.   Neurological:      General: No focal deficit present.      Mental Status: She is alert and oriented to person, place, and time.      GCS: GCS eye subscore is 4. GCS verbal subscore is 5. GCS motor subscore is 6.      Motor: No abnormal muscle tone.   Psychiatric:         Mood and Affect: Mood normal.         Behavior: Behavior normal.         Procedures  EKG shows sinus rhythm, rate 70.  CA  interval 168, QRS duration 88, QTc 449 ms.  Nonspecific ST-T changes.  No evidence for STEMI.  XR Chest 1 View   Final Result   No acute cardiopulmonary findings.      No significant interval change from prior study.      Signer Name: Norm Roque MD    Signed: 11/19/2022 2:32 PM    Workstation Name: [id number]     [facility]        Results for orders placed or performed during the hospital encounter of 11/19/22   Comprehensive Metabolic Panel    Specimen: Blood   Result Value Ref Range    Glucose 286 (H) 65 - 99 mg/dL    BUN 13 8 - 23 mg/dL    Creatinine 0.81 0.57 - 1.00 mg/dL    Sodium 139 136 - 145 mmol/L    Potassium 3.5 3.5 - 5.2 mmol/L    Chloride 98 98 - 107 mmol/L    CO2 28.7 22.0 - 29.0 mmol/L    Calcium 9.1 8.6 - 10.5 mg/dL    Total Protein 7.1 6.0 - 8.5 g/dL    Albumin 3.52 3.50 - 5.20 g/dL    ALT (SGPT) 17 1 - 33 U/L    AST (SGOT) 22 1 - 32 U/L    Alkaline Phosphatase 118 (H) 39 - 117 U/L    Total Bilirubin 0.5 0.0 - 1.2 mg/dL    Globulin 3.6 gm/dL    A/G Ratio 1.0 g/dL    BUN/Creatinine Ratio 16.0 7.0 - 25.0    Anion Gap 12.3 5.0 - 15.0 mmol/L    eGFR 76.3 >60.0 mL/min/1.73   Troponin    Specimen: Blood   Result Value Ref Range    Troponin T <0.010 0.000 - 0.030 ng/mL   Lactic Acid, Plasma    Specimen: Blood   Result Value Ref Range    Lactate 2.1 (C) 0.5 - 2.0 mmol/L   C-reactive Protein    Specimen: Blood   Result Value Ref Range    C-Reactive Protein 12.27 (H) 0.00 - 0.50 mg/dL   Sedimentation Rate    Specimen: Blood   Result Value Ref Range    Sed Rate 74 (H) 0 - 30 mm/hr   Urinalysis With Culture If Indicated - Urine, Catheter    Specimen: Urine, Catheter   Result Value Ref Range    Color, UA Yellow Yellow, Straw    Appearance, UA Clear Clear    pH, UA 6.0 5.0 - 8.0    Specific Gravity, UA 1.012 1.005 - 1.030    Glucose,  mg/dL (1+) (A) Negative    Ketones, UA Negative Negative    Bilirubin, UA Negative Negative    Blood, UA Negative Negative    Protein, UA  Negative Negative    Leuk Esterase, UA Negative Negative    Nitrite, UA Negative Negative    Urobilinogen, UA 0.2 E.U./dL 0.2 - 1.0 E.U./dL   CBC Auto Differential    Specimen: Blood   Result Value Ref Range    WBC 11.47 (H) 3.40 - 10.80 10*3/mm3    RBC 3.98 3.77 - 5.28 10*6/mm3    Hemoglobin 12.2 12.0 - 15.9 g/dL    Hematocrit 37.4 34.0 - 46.6 %    MCV 94.0 79.0 - 97.0 fL    MCH 30.7 26.6 - 33.0 pg    MCHC 32.6 31.5 - 35.7 g/dL    RDW 13.3 12.3 - 15.4 %    RDW-SD 45.9 37.0 - 54.0 fl    MPV 8.2 6.0 - 12.0 fL    Platelets 235 140 - 450 10*3/mm3    Neutrophil % 70.3 42.7 - 76.0 %    Lymphocyte % 20.1 19.6 - 45.3 %    Monocyte % 6.7 5.0 - 12.0 %    Eosinophil % 2.3 0.3 - 6.2 %    Basophil % 0.3 0.0 - 1.5 %    Immature Grans % 0.3 0.0 - 0.5 %    Neutrophils, Absolute 8.06 (H) 1.70 - 7.00 10*3/mm3    Lymphocytes, Absolute 2.31 0.70 - 3.10 10*3/mm3    Monocytes, Absolute 0.77 0.10 - 0.90 10*3/mm3    Eosinophils, Absolute 0.26 0.00 - 0.40 10*3/mm3    Basophils, Absolute 0.04 0.00 - 0.20 10*3/mm3    Immature Grans, Absolute 0.03 0.00 - 0.05 10*3/mm3    nRBC 0.0 0.0 - 0.2 /100 WBC   STAT Lactic Acid, Reflex    Specimen: Arm, Left; Blood   Result Value Ref Range    Lactate 1.6 0.5 - 2.0 mmol/L   ECG 12 Lead Other; Bacteremia   Result Value Ref Range    QT Interval 416 ms    QTC Interval 449 ms   Green Top (Gel)   Result Value Ref Range    Extra Tube Hold for add-ons.    Lavender Top   Result Value Ref Range    Extra Tube hold for add-on    Gold Top - SST   Result Value Ref Range    Extra Tube Hold for add-ons.    Light Blue Top   Result Value Ref Range    Extra Tube Hold for add-ons.                 ED Course  ED Course as of 11/19/22 1731   Sat Nov 19, 2022   1516 I called the hospitalist Dr. Royal for admission, as in the past the policy per the ID service was that all patients with positive blood cultures be admitted to the hospital.  She advises that the patient does not need to be admitted to the hospital, does not meet  admission criteria, that she can return to the nursing home on Omnicef for a total of 14 days of treatment.  She conferred with her hospitalist team, and she states that other physicians of the team concurred.  She had IV Rocephin here yesterday, was discharged home with an additional 9 days of Omnicef.  She is having IV Rocephin here today as well, will provide an additional 3-day prescription of Omnicef. [CM]      ED Course User Index  [CM] Colt Jo MD                                           [facility]    Final diagnoses:   Bacteremia due to Klebsiella pneumoniae       ED Disposition  ED Disposition     ED Disposition   Discharge    Condition   Stable    Comment   --             Jan Hammonds MD  [address] [facility] [address]  [phone]    Go in 2 days  Monday    [facility]  [address] [place] [id number]  [phone]  Go to   If symptoms worsen         Medication List      Changed    cefdinir 300 MG capsule  Commonly known as: OMNICEF  Take 1 capsule by mouth 2 (Two) Times a Day for 12 days. Starting tomorrow morning, Sunday, November 20, 2022; in addition to the 9-day supply that I wrote yesterday, total 12 days Omnicef  What changed: additional instructions           Where to Get Your Medications      You can get these medications from any pharmacy    Bring a paper prescription for each of these medications  · cefdinir 300 MG capsule       Please note that portions of this note were completed with a voice recognition program.        Colt Jo MD  11/19/22 3198

## 2022-11-20 LAB
QT INTERVAL: 416 MS
QTC INTERVAL: 449 MS

## 2022-11-21 LAB
BACTERIA SPEC AEROBE CULT: ABNORMAL
GRAM STN SPEC: ABNORMAL
GRAM STN SPEC: ABNORMAL
ISOLATED FROM: ABNORMAL

## 2022-11-23 LAB — BACTERIA SPEC AEROBE CULT: NORMAL

## 2022-11-24 LAB
BACTERIA SPEC AEROBE CULT: NORMAL
BACTERIA SPEC AEROBE CULT: NORMAL

## 2022-12-07 ENCOUNTER — LAB REQUISITION (OUTPATIENT)
Dept: LAB | Facility: HOSPITAL | Age: 72
End: 2022-12-07

## 2022-12-07 DIAGNOSIS — R09.89 OTHER SPECIFIED SYMPTOMS AND SIGNS INVOLVING THE CIRCULATORY AND RESPIRATORY SYSTEMS: ICD-10-CM

## 2022-12-07 LAB
FLUAV RNA RESP QL NAA+PROBE: NOT DETECTED
FLUBV RNA RESP QL NAA+PROBE: NOT DETECTED
SARS-COV-2 RNA RESP QL NAA+PROBE: NOT DETECTED

## 2022-12-07 PROCEDURE — 87636 SARSCOV2 & INF A&B AMP PRB: CPT | Performed by: INTERNAL MEDICINE

## 2022-12-12 ENCOUNTER — LAB REQUISITION (OUTPATIENT)
Dept: LAB | Facility: HOSPITAL | Age: 72
End: 2022-12-12

## 2022-12-12 DIAGNOSIS — D64.9 ANEMIA, UNSPECIFIED: ICD-10-CM

## 2022-12-12 DIAGNOSIS — N39.0 URINARY TRACT INFECTION, SITE NOT SPECIFIED: ICD-10-CM

## 2022-12-12 LAB
ANION GAP SERPL CALCULATED.3IONS-SCNC: 17.9 MMOL/L (ref 5–15)
BASOPHILS # BLD AUTO: 0.07 10*3/MM3 (ref 0–0.2)
BASOPHILS NFR BLD AUTO: 0.6 % (ref 0–1.5)
BILIRUB UR QL STRIP: ABNORMAL
BUN SERPL-MCNC: 21 MG/DL (ref 8–23)
BUN/CREAT SERPL: 18.1 (ref 7–25)
CALCIUM SPEC-SCNC: 10.4 MG/DL (ref 8.6–10.5)
CHLORIDE SERPL-SCNC: 93 MMOL/L (ref 98–107)
CLARITY UR: ABNORMAL
CO2 SERPL-SCNC: 28.1 MMOL/L (ref 22–29)
COLOR UR: ABNORMAL
CREAT SERPL-MCNC: 1.16 MG/DL (ref 0.57–1)
DEPRECATED RDW RBC AUTO: 44.6 FL (ref 37–54)
EGFRCR SERPLBLD CKD-EPI 2021: 49.6 ML/MIN/1.73
EOSINOPHIL # BLD AUTO: 0.39 10*3/MM3 (ref 0–0.4)
EOSINOPHIL NFR BLD AUTO: 3.3 % (ref 0.3–6.2)
ERYTHROCYTE [DISTWIDTH] IN BLOOD BY AUTOMATED COUNT: 13 % (ref 12.3–15.4)
GLUCOSE SERPL-MCNC: 151 MG/DL (ref 65–99)
GLUCOSE UR STRIP-MCNC: ABNORMAL MG/DL
HCT VFR BLD AUTO: 46 % (ref 34–46.6)
HGB BLD-MCNC: 14.7 G/DL (ref 12–15.9)
HGB UR QL STRIP.AUTO: NEGATIVE
IMM GRANULOCYTES # BLD AUTO: 0.04 10*3/MM3 (ref 0–0.05)
IMM GRANULOCYTES NFR BLD AUTO: 0.3 % (ref 0–0.5)
KETONES UR QL STRIP: ABNORMAL
LEUKOCYTE ESTERASE UR QL STRIP.AUTO: NEGATIVE
LYMPHOCYTES # BLD AUTO: 3.05 10*3/MM3 (ref 0.7–3.1)
LYMPHOCYTES NFR BLD AUTO: 25.9 % (ref 19.6–45.3)
MCH RBC QN AUTO: 30.1 PG (ref 26.6–33)
MCHC RBC AUTO-ENTMCNC: 32 G/DL (ref 31.5–35.7)
MCV RBC AUTO: 94.1 FL (ref 79–97)
MONOCYTES # BLD AUTO: 0.78 10*3/MM3 (ref 0.1–0.9)
MONOCYTES NFR BLD AUTO: 6.6 % (ref 5–12)
NEUTROPHILS NFR BLD AUTO: 63.3 % (ref 42.7–76)
NEUTROPHILS NFR BLD AUTO: 7.44 10*3/MM3 (ref 1.7–7)
NITRITE UR QL STRIP: NEGATIVE
NRBC BLD AUTO-RTO: 0 /100 WBC (ref 0–0.2)
PH UR STRIP.AUTO: 6 [PH] (ref 5–8)
PLATELET # BLD AUTO: 303 10*3/MM3 (ref 140–450)
PMV BLD AUTO: 8.8 FL (ref 6–12)
POTASSIUM SERPL-SCNC: 4.1 MMOL/L (ref 3.5–5.2)
PROT UR QL STRIP: ABNORMAL
RBC # BLD AUTO: 4.89 10*6/MM3 (ref 3.77–5.28)
SODIUM SERPL-SCNC: 139 MMOL/L (ref 136–145)
SP GR UR STRIP: 1.02 (ref 1–1.03)
UROBILINOGEN UR QL STRIP: ABNORMAL
WBC NRBC COR # BLD: 11.77 10*3/MM3 (ref 3.4–10.8)

## 2022-12-12 PROCEDURE — 85025 COMPLETE CBC W/AUTO DIFF WBC: CPT | Performed by: INTERNAL MEDICINE

## 2022-12-12 PROCEDURE — 80048 BASIC METABOLIC PNL TOTAL CA: CPT | Performed by: INTERNAL MEDICINE

## 2022-12-12 PROCEDURE — 81003 URINALYSIS AUTO W/O SCOPE: CPT | Performed by: INTERNAL MEDICINE

## 2022-12-20 ENCOUNTER — OFFICE VISIT (OUTPATIENT)
Dept: GASTROENTEROLOGY | Facility: CLINIC | Age: 72
End: 2022-12-20
Payer: MEDICARE

## 2022-12-20 VITALS
BODY MASS INDEX: 49.61 KG/M2 | WEIGHT: 280 LBS | DIASTOLIC BLOOD PRESSURE: 64 MMHG | HEIGHT: 63 IN | SYSTOLIC BLOOD PRESSURE: 101 MMHG | HEART RATE: 75 BPM

## 2022-12-20 DIAGNOSIS — R13.19 ESOPHAGEAL DYSPHAGIA: Primary | ICD-10-CM

## 2022-12-20 PROCEDURE — 99204 OFFICE O/P NEW MOD 45 MIN: CPT | Performed by: PHYSICIAN ASSISTANT

## 2022-12-20 NOTE — PROGRESS NOTES
Chief Complaint   Patient presents with   • Difficulty Swallowing       Andreea Marshall is a 72 y.o. female who presents to the office today for evaluation of Difficulty Swallowing  .    HPI   Patient presents the clinic today for difficulty swallowing.  She states that this is been an ongoing issue however seems to be worsening here recently.  Patient states that she has a lot of coughing episodes anytime she tries to eat or drink anything.  She denies any specific trouble swallowing certain foods.  She is able to drink liquids normally.  She denies any heartburn/reflux symptoms-is currently on medication to treat this.  She has had an EGD performed in the past however unsure of esophageal dilation.    Review of Systems   Constitutional: Negative for fever.   HENT: Positive for trouble swallowing. Negative for sore throat.    Eyes: Negative.    Respiratory: Negative.    Cardiovascular: Negative.    Gastrointestinal: Negative for abdominal distention, abdominal pain, anal bleeding, blood in stool, constipation, diarrhea, nausea, rectal pain and vomiting.   Endocrine: Negative.    Genitourinary: Negative.    Musculoskeletal: Negative.    Skin: Negative.    Allergic/Immunologic: Negative.    Neurological: Negative.    Hematological: Negative.    Psychiatric/Behavioral: Negative.        ACTIVE PROBLEMS:   Specialty Problems        Gastroenterology Problems    Diverticulosis        GERD without esophagitis           PAST MEDICAL HISTORY:  Past Medical History:   Diagnosis Date   • COPD (chronic obstructive pulmonary disease) ([facility])    • Dementia ([facility])    • Diverticulosis    • Essential hypertension    • GERD (gastroesophageal reflux disease)    • Osteoporosis    • Paroxysmal atrial fibrillation ([facility])    • Type II diabetes mellitus ([facility])        SURGICAL HISTORY:  Past Surgical History:   Procedure Laterality Date   • APPENDECTOMY     • CHOLECYSTECTOMY     • TONSILLECTOMY         FAMILY HISTORY:  Family History   Problem  Relation Age of Onset   • Cerebral aneurysm Mother    • Heart failure Father    • Diabetes Father        SOCIAL HISTORY:  Social History     Tobacco Use   • Smoking status: Never   • Smokeless tobacco: Never   Substance Use Topics   • Alcohol use: Never       CURRENT MEDICATION:    Current Outpatient Medications:   •  albuterol sulfate  (90 Base) MCG/ACT inhaler, Inhale 2 puffs Every 6 (Six) Hours As Needed for Wheezing., Disp: , Rfl:   •  amLODIPine (NORVASC) 5 MG tablet, Take 1 tablet by mouth Daily., Disp: , Rfl:   •  aspirin 325 MG tablet, Take 1 tablet by mouth Daily., Disp: 30 tablet, Rfl: 0  •  atorvastatin (LIPITOR) 40 MG tablet, Take 1 tablet by mouth Every Night., Disp: , Rfl:   •  benzonatate (TESSALON) 100 MG capsule, Take 100 mg by mouth 3 (Three) Times a Day As Needed for Cough., Disp: , Rfl:   •  budesonide (PULMICORT) 180 MCG/ACT inhaler, Inhale 1 puff 2 (Two) Times a Day., Disp: , Rfl:   •  donepezil (ARICEPT) 5 MG tablet, Take 5 mg by mouth Daily., Disp: , Rfl:   •  DULoxetine (CYMBALTA) 30 MG capsule, Take 1 capsule by mouth Daily., Disp: , Rfl:   •  esomeprazole (nexIUM) 40 MG capsule, Take 40 mg by mouth Every Morning Before Breakfast., Disp: , Rfl:   •  ezetimibe (Zetia) 10 MG tablet, Take 1 tablet by mouth Daily., Disp: , Rfl:   •  furosemide (LASIX) 20 MG tablet, Take 20 mg by mouth Every Other Day., Disp: , Rfl:   •  insulin glargine (LANTUS, SEMGLEE) 100 UNIT/ML injection, Inject 45 Units under the skin into the appropriate area as directed 2 (Two) Times a Day., Disp: , Rfl:   •  insulin lispro (humaLOG) 100 UNIT/ML injection, Inject 0-12 Units under the skin into the appropriate area as directed 3 (Three) Times a Day Before Meals., Disp: , Rfl:   •  insulin lispro (humaLOG) 100 UNIT/ML injection, Inject 14 Units under the skin into the appropriate area as directed 3 (Three) Times a Day Before Meals., Disp: , Rfl:   •  ipratropium-albuterol (DUO-NEB) 0.5-2.5 mg/3 ml nebulizer, Take 3  "mL by nebulization 4 (Four) Times a Day., Disp: , Rfl:   •  midodrine (PROAMATINE) 5 MG tablet, Take 5 mg by mouth 2 (Two) Times a Day., Disp: , Rfl:   •  montelukast (SINGULAIR) 10 MG tablet, Take 10 mg by mouth Daily., Disp: , Rfl:   •  potassium chloride 10 MEQ CR tablet, Take 10 mEq by mouth Daily., Disp: , Rfl:   •  sotalol (BETAPACE) 80 MG tablet, Take 1 tablet by mouth Every 12 (Twelve) Hours., Disp: 60 tablet, Rfl: 0  •  tiZANidine (ZANAFLEX) 2 MG half tablet, Take 2 mg by mouth 2 (two) times a day., Disp: , Rfl:   •  triamterene-hydrochlorothiazide (MAXZIDE-25) 37.5-25 MG per tablet, Take 1 tablet by mouth Daily., Disp: , Rfl:     ALLERGIES:  Patient has no known allergies.    VISIT VITALS:  /64   Pulse 75   Ht 160 cm (63\")   Wt 127 kg (280 lb)   BMI 49.60 kg/m²   Physical Exam  Constitutional:       General: She is not in acute distress.     Appearance: Normal appearance. She is well-developed.   HENT:      Head: Normocephalic and atraumatic.   Eyes:      Pupils: Pupils are equal, round, and reactive to light.   Cardiovascular:      Rate and Rhythm: Normal rate and regular rhythm.      Heart sounds: Normal heart sounds.   Pulmonary:      Effort: Pulmonary effort is normal. No respiratory distress.      Breath sounds: Normal breath sounds. No wheezing, rhonchi or rales.   Abdominal:      General: Abdomen is flat. Bowel sounds are normal. There is no distension.      Palpations: Abdomen is soft. There is no mass.      Tenderness: There is no abdominal tenderness. There is no guarding or rebound.      Hernia: No hernia is present.   Musculoskeletal:         General: No swelling. Normal range of motion.      Cervical back: Normal range of motion and neck supple.      Right lower leg: No edema.      Left lower leg: No edema.   Skin:     General: Skin is warm and dry.   Neurological:      Mental Status: She is alert and oriented to person, place, and time.   Psychiatric:         Attention and " Perception: Attention normal.         Mood and Affect: Mood normal.         Speech: Speech normal.         Behavior: Behavior normal. Behavior is cooperative.         Thought Content: Thought content normal.         Assessment        1. Dysphagia  - Will order an esophagram.  - Will order an EGD.     Diagnosis Plan   1. Esophageal dysphagia  FL Esophagram Complete    Case Request    Follow Anesthesia Guidelines / Protocol    Obtain Informed Consent    Case Request          Return for after procedure follow-up.               This document has been electronically signed by Whitney Garza PA-C  January 16, 2023 12:23 EST    Part of this note may be an electronic transcription/translation of spoken language to printed text using the Dragon Dictation System.      Transcribed from ambient dictation for Whitney Garza PA-C by Mai Mcdowell.  12/20/22   13:36 EST    Patient or patient representative verbalized consent to the visit recording.

## 2022-12-28 ENCOUNTER — APPOINTMENT (OUTPATIENT)
Dept: GENERAL RADIOLOGY | Facility: HOSPITAL | Age: 72
End: 2022-12-28

## 2023-01-11 ENCOUNTER — APPOINTMENT (OUTPATIENT)
Dept: GENERAL RADIOLOGY | Facility: HOSPITAL | Age: 73
End: 2023-01-11
Payer: MEDICARE

## 2023-01-19 ENCOUNTER — APPOINTMENT (OUTPATIENT)
Dept: GENERAL RADIOLOGY | Facility: HOSPITAL | Age: 73
End: 2023-01-19
Payer: MEDICARE

## 2023-01-19 ENCOUNTER — HOSPITAL ENCOUNTER (OUTPATIENT)
Dept: GENERAL RADIOLOGY | Facility: HOSPITAL | Age: 73
Discharge: HOME | End: 2023-01-19
Admitting: PHYSICIAN ASSISTANT
Payer: MEDICARE

## 2023-01-19 ENCOUNTER — LAB REQUISITION (OUTPATIENT)
Dept: LAB | Facility: HOSPITAL | Age: 73
End: 2023-01-19
Payer: MEDICARE

## 2023-01-19 DIAGNOSIS — R50.9 FEVER, UNSPECIFIED: ICD-10-CM

## 2023-01-19 DIAGNOSIS — R13.19 ESOPHAGEAL DYSPHAGIA: ICD-10-CM

## 2023-01-19 LAB
FLUAV RNA RESP QL NAA+PROBE: NOT DETECTED
FLUBV RNA RESP QL NAA+PROBE: NOT DETECTED
RSV RNA NPH QL NAA+NON-PROBE: NOT DETECTED
SARS-COV-2 RNA RESP QL NAA+PROBE: NOT DETECTED

## 2023-01-19 PROCEDURE — 74220 X-RAY XM ESOPHAGUS 1CNTRST: CPT | Performed by: RADIOLOGY

## 2023-01-19 PROCEDURE — 87637 SARSCOV2&INF A&B&RSV AMP PRB: CPT | Performed by: INTERNAL MEDICINE

## 2023-01-19 PROCEDURE — C9803 HOPD COVID-19 SPEC COLLECT: HCPCS

## 2023-01-19 PROCEDURE — 74220 X-RAY XM ESOPHAGUS 1CNTRST: CPT

## 2023-01-23 ENCOUNTER — TELEPHONE (OUTPATIENT)
Dept: GASTROENTEROLOGY | Facility: CLINIC | Age: 73
End: 2023-01-23
Payer: MEDICARE

## 2023-01-23 DIAGNOSIS — K21.9 GASTROESOPHAGEAL REFLUX DISEASE WITHOUT ESOPHAGITIS: Primary | ICD-10-CM

## 2023-01-23 DIAGNOSIS — R13.19 ESOPHAGEAL DYSPHAGIA: ICD-10-CM

## 2023-01-23 RX ORDER — DEXLANSOPRAZOLE 60 MG/1
60 CAPSULE, DELAYED RELEASE ORAL
Qty: 30 CAPSULE | Refills: 11 | Status: SHIPPED | OUTPATIENT
Start: 2023-01-23

## 2023-01-23 NOTE — TELEPHONE ENCOUNTER
Please let patient know that her swallowing study showed GERD which means that her Nexium is not working as well as it should.  I have sent her in a prescription of Dexilant 60 mg once daily to [facility]

## 2023-01-23 NOTE — TELEPHONE ENCOUNTER
I called the [facility],  spoke to the nurse in charge of patient today. She states they need the Dexilant RX faxed to the nursing [place] at [phone]

## 2023-01-24 PROBLEM — R13.19 ESOPHAGEAL DYSPHAGIA: Status: ACTIVE | Noted: 2023-01-24

## 2023-02-01 ENCOUNTER — LAB REQUISITION (OUTPATIENT)
Dept: LAB | Facility: HOSPITAL | Age: 73
End: 2023-02-01
Payer: MEDICARE

## 2023-02-01 DIAGNOSIS — I10 ESSENTIAL (PRIMARY) HYPERTENSION: ICD-10-CM

## 2023-02-01 LAB
ALBUMIN SERPL-MCNC: 4 G/DL (ref 3.5–5.2)
ALBUMIN/GLOB SERPL: 1.1 G/DL
ALP SERPL-CCNC: 132 U/L (ref 39–117)
ALT SERPL W P-5'-P-CCNC: 35 U/L (ref 1–33)
ANION GAP SERPL CALCULATED.3IONS-SCNC: 12.9 MMOL/L (ref 5–15)
AST SERPL-CCNC: 62 U/L (ref 1–32)
BILIRUB SERPL-MCNC: 0.6 MG/DL (ref 0–1.2)
BUN SERPL-MCNC: 20 MG/DL (ref 8–23)
BUN/CREAT SERPL: 20 (ref 7–25)
CALCIUM SPEC-SCNC: 9.5 MG/DL (ref 8.6–10.5)
CHLORIDE SERPL-SCNC: 94 MMOL/L (ref 98–107)
CO2 SERPL-SCNC: 31.1 MMOL/L (ref 22–29)
CREAT SERPL-MCNC: 1 MG/DL (ref 0.57–1)
CRP SERPL-MCNC: 1.37 MG/DL (ref 0–0.5)
EGFRCR SERPLBLD CKD-EPI 2021: 59.2 ML/MIN/1.73
GLOBULIN UR ELPH-MCNC: 3.6 GM/DL
GLUCOSE SERPL-MCNC: 265 MG/DL (ref 65–99)
POTASSIUM SERPL-SCNC: 3.7 MMOL/L (ref 3.5–5.2)
PROT SERPL-MCNC: 7.6 G/DL (ref 6–8.5)
SODIUM SERPL-SCNC: 138 MMOL/L (ref 136–145)

## 2023-02-01 PROCEDURE — 86140 C-REACTIVE PROTEIN: CPT | Performed by: NURSE PRACTITIONER

## 2023-02-01 PROCEDURE — 80053 COMPREHEN METABOLIC PANEL: CPT | Performed by: NURSE PRACTITIONER

## 2023-02-02 ENCOUNTER — LAB REQUISITION (OUTPATIENT)
Dept: LAB | Facility: HOSPITAL | Age: 73
End: 2023-02-02
Payer: MEDICARE

## 2023-02-02 DIAGNOSIS — I12.9 HYPERTENSIVE CHRONIC KIDNEY DISEASE WITH STAGE 1 THROUGH STAGE 4 CHRONIC KIDNEY DISEASE, OR UNSPECIFIED CHRONIC KIDNEY DISEASE: ICD-10-CM

## 2023-02-02 LAB
DEPRECATED RDW RBC AUTO: 42.7 FL (ref 37–54)
ERYTHROCYTE [DISTWIDTH] IN BLOOD BY AUTOMATED COUNT: 12.8 % (ref 12.3–15.4)
HCT VFR BLD AUTO: 40.7 % (ref 34–46.6)
HGB BLD-MCNC: 13.4 G/DL (ref 12–15.9)
MCH RBC QN AUTO: 30 PG (ref 26.6–33)
MCHC RBC AUTO-ENTMCNC: 32.9 G/DL (ref 31.5–35.7)
MCV RBC AUTO: 91.3 FL (ref 79–97)
PLATELET # BLD AUTO: 258 10*3/MM3 (ref 140–450)
PMV BLD AUTO: 8.7 FL (ref 6–12)
RBC # BLD AUTO: 4.46 10*6/MM3 (ref 3.77–5.28)
WBC NRBC COR # BLD: 8.97 10*3/MM3 (ref 3.4–10.8)

## 2023-02-02 PROCEDURE — 85027 COMPLETE CBC AUTOMATED: CPT | Performed by: INTERNAL MEDICINE

## 2023-02-03 ENCOUNTER — LAB REQUISITION (OUTPATIENT)
Dept: LAB | Facility: HOSPITAL | Age: 73
End: 2023-02-03
Payer: MEDICARE

## 2023-02-03 DIAGNOSIS — Z20.828 CONTACT WITH AND (SUSPECTED) EXPOSURE TO OTHER VIRAL COMMUNICABLE DISEASES: ICD-10-CM

## 2023-02-03 LAB
FLUAV RNA RESP QL NAA+PROBE: NOT DETECTED
FLUBV RNA RESP QL NAA+PROBE: NOT DETECTED
RSV RNA NPH QL NAA+NON-PROBE: NOT DETECTED
SARS-COV-2 RNA RESP QL NAA+PROBE: DETECTED

## 2023-02-03 PROCEDURE — 87637 SARSCOV2&INF A&B&RSV AMP PRB: CPT | Performed by: INTERNAL MEDICINE

## 2023-02-08 ENCOUNTER — TELEPHONE (OUTPATIENT)
Dept: UROLOGY | Facility: CLINIC | Age: 73
End: 2023-02-08
Payer: MEDICARE

## 2023-02-08 NOTE — TELEPHONE ENCOUNTER
Patient's egd needs rescheduled.  [facility] call [facility] at [phone] and ask to speak to her nurse to reschedule it.

## 2023-02-09 NOTE — TELEPHONE ENCOUNTER
Spoke with patient's nurse (Daly) at the [facility] and got Andreea's procedure r/s ro 4-11-23. I faxed over instructions to her at [phone]. [facility] was notified.

## 2023-02-17 ENCOUNTER — APPOINTMENT (OUTPATIENT)
Dept: GENERAL RADIOLOGY | Facility: HOSPITAL | Age: 73
End: 2023-02-17
Payer: MEDICARE

## 2023-02-17 ENCOUNTER — APPOINTMENT (OUTPATIENT)
Dept: CT IMAGING | Facility: HOSPITAL | Age: 73
End: 2023-02-17
Payer: MEDICARE

## 2023-02-17 ENCOUNTER — LAB REQUISITION (OUTPATIENT)
Dept: LAB | Facility: HOSPITAL | Age: 73
End: 2023-02-17
Payer: MEDICARE

## 2023-02-17 ENCOUNTER — HOSPITAL ENCOUNTER (EMERGENCY)
Facility: HOSPITAL | Age: 73
Discharge: NURSING, REHAB OR LONG-TERM CARE | End: 2023-02-17
Attending: STUDENT IN AN ORGANIZED HEALTH CARE EDUCATION/TRAINING PROGRAM | Admitting: STUDENT IN AN ORGANIZED HEALTH CARE EDUCATION/TRAINING PROGRAM
Payer: MEDICARE

## 2023-02-17 VITALS
DIASTOLIC BLOOD PRESSURE: 81 MMHG | HEART RATE: 72 BPM | RESPIRATION RATE: 18 BRPM | SYSTOLIC BLOOD PRESSURE: 150 MMHG | HEIGHT: 63 IN | BODY MASS INDEX: 49.61 KG/M2 | TEMPERATURE: 97.9 F | WEIGHT: 280 LBS | OXYGEN SATURATION: 96 %

## 2023-02-17 DIAGNOSIS — N30.00 ACUTE CYSTITIS WITHOUT HEMATURIA: Primary | ICD-10-CM

## 2023-02-17 DIAGNOSIS — E11.9 TYPE 2 DIABETES MELLITUS WITHOUT COMPLICATIONS: ICD-10-CM

## 2023-02-17 LAB
ALBUMIN SERPL-MCNC: 3.4 G/DL (ref 3.5–5.2)
ALBUMIN/GLOB SERPL: 0.9 G/DL
ALP SERPL-CCNC: 126 U/L (ref 39–117)
ALT SERPL W P-5'-P-CCNC: 16 U/L (ref 1–33)
ANION GAP SERPL CALCULATED.3IONS-SCNC: 13.1 MMOL/L (ref 5–15)
AST SERPL-CCNC: 17 U/L (ref 1–32)
BACTERIA UR QL AUTO: ABNORMAL /HPF
BASOPHILS # BLD AUTO: 0.05 10*3/MM3 (ref 0–0.2)
BASOPHILS NFR BLD AUTO: 0.5 % (ref 0–1.5)
BILIRUB SERPL-MCNC: 0.6 MG/DL (ref 0–1.2)
BILIRUB UR QL STRIP: NEGATIVE
BUN SERPL-MCNC: 15 MG/DL (ref 8–23)
BUN/CREAT SERPL: 15.5 (ref 7–25)
CALCIUM SPEC-SCNC: 9.5 MG/DL (ref 8.6–10.5)
CHLORIDE SERPL-SCNC: 94 MMOL/L (ref 98–107)
CLARITY UR: CLEAR
CO2 SERPL-SCNC: 27.9 MMOL/L (ref 22–29)
COLOR UR: YELLOW
CREAT SERPL-MCNC: 0.97 MG/DL (ref 0.57–1)
CRP SERPL-MCNC: 0.8 MG/DL (ref 0–0.5)
DEPRECATED RDW RBC AUTO: 41.8 FL (ref 37–54)
EGFRCR SERPLBLD CKD-EPI 2021: 61.1 ML/MIN/1.73
EOSINOPHIL # BLD AUTO: 0.4 10*3/MM3 (ref 0–0.4)
EOSINOPHIL NFR BLD AUTO: 3.6 % (ref 0.3–6.2)
ERYTHROCYTE [DISTWIDTH] IN BLOOD BY AUTOMATED COUNT: 12.5 % (ref 12.3–15.4)
FLUAV RNA RESP QL NAA+PROBE: NOT DETECTED
FLUBV RNA ISLT QL NAA+PROBE: NOT DETECTED
GLOBULIN UR ELPH-MCNC: 3.6 GM/DL
GLUCOSE SERPL-MCNC: 316 MG/DL (ref 65–99)
GLUCOSE UR STRIP-MCNC: NEGATIVE MG/DL
HBA1C MFR BLD: 9.8 % (ref 4.8–5.6)
HCT VFR BLD AUTO: 39.8 % (ref 34–46.6)
HGB BLD-MCNC: 13 G/DL (ref 12–15.9)
HGB UR QL STRIP.AUTO: NEGATIVE
HYALINE CASTS UR QL AUTO: ABNORMAL /LPF
IMM GRANULOCYTES # BLD AUTO: 0.03 10*3/MM3 (ref 0–0.05)
IMM GRANULOCYTES NFR BLD AUTO: 0.3 % (ref 0–0.5)
KETONES UR QL STRIP: NEGATIVE
LEUKOCYTE ESTERASE UR QL STRIP.AUTO: NEGATIVE
LYMPHOCYTES # BLD AUTO: 2.51 10*3/MM3 (ref 0.7–3.1)
LYMPHOCYTES NFR BLD AUTO: 22.8 % (ref 19.6–45.3)
MCH RBC QN AUTO: 29.9 PG (ref 26.6–33)
MCHC RBC AUTO-ENTMCNC: 32.7 G/DL (ref 31.5–35.7)
MCV RBC AUTO: 91.5 FL (ref 79–97)
MONOCYTES # BLD AUTO: 0.63 10*3/MM3 (ref 0.1–0.9)
MONOCYTES NFR BLD AUTO: 5.7 % (ref 5–12)
NEUTROPHILS NFR BLD AUTO: 67.1 % (ref 42.7–76)
NEUTROPHILS NFR BLD AUTO: 7.41 10*3/MM3 (ref 1.7–7)
NITRITE UR QL STRIP: POSITIVE
NRBC BLD AUTO-RTO: 0 /100 WBC (ref 0–0.2)
PH UR STRIP.AUTO: 5.5 [PH] (ref 5–8)
PLATELET # BLD AUTO: 256 10*3/MM3 (ref 140–450)
PMV BLD AUTO: 8.8 FL (ref 6–12)
POTASSIUM SERPL-SCNC: 3.6 MMOL/L (ref 3.5–5.2)
PROT SERPL-MCNC: 7 G/DL (ref 6–8.5)
PROT UR QL STRIP: NEGATIVE
RBC # BLD AUTO: 4.35 10*6/MM3 (ref 3.77–5.28)
RBC # UR STRIP: ABNORMAL /HPF
REF LAB TEST METHOD: ABNORMAL
SARS-COV-2 RNA RESP QL NAA+PROBE: DETECTED
SODIUM SERPL-SCNC: 135 MMOL/L (ref 136–145)
SP GR UR STRIP: 1.01 (ref 1–1.03)
SQUAMOUS #/AREA URNS HPF: ABNORMAL /HPF
UROBILINOGEN UR QL STRIP: ABNORMAL
WBC # UR STRIP: ABNORMAL /HPF
WBC NRBC COR # BLD: 11.03 10*3/MM3 (ref 3.4–10.8)

## 2023-02-17 PROCEDURE — 96365 THER/PROPH/DIAG IV INF INIT: CPT

## 2023-02-17 PROCEDURE — 81001 URINALYSIS AUTO W/SCOPE: CPT | Performed by: PHYSICIAN ASSISTANT

## 2023-02-17 PROCEDURE — 83036 HEMOGLOBIN GLYCOSYLATED A1C: CPT | Performed by: INTERNAL MEDICINE

## 2023-02-17 PROCEDURE — 70450 CT HEAD/BRAIN W/O DYE: CPT | Performed by: RADIOLOGY

## 2023-02-17 PROCEDURE — 99285 EMERGENCY DEPT VISIT HI MDM: CPT

## 2023-02-17 PROCEDURE — 70450 CT HEAD/BRAIN W/O DYE: CPT

## 2023-02-17 PROCEDURE — 86140 C-REACTIVE PROTEIN: CPT | Performed by: PHYSICIAN ASSISTANT

## 2023-02-17 PROCEDURE — 25010000002 CEFTRIAXONE PER 250 MG: Performed by: PHYSICIAN ASSISTANT

## 2023-02-17 PROCEDURE — 80053 COMPREHEN METABOLIC PANEL: CPT | Performed by: PHYSICIAN ASSISTANT

## 2023-02-17 PROCEDURE — 71045 X-RAY EXAM CHEST 1 VIEW: CPT

## 2023-02-17 PROCEDURE — 71045 X-RAY EXAM CHEST 1 VIEW: CPT | Performed by: RADIOLOGY

## 2023-02-17 PROCEDURE — 85025 COMPLETE CBC W/AUTO DIFF WBC: CPT | Performed by: PHYSICIAN ASSISTANT

## 2023-02-17 PROCEDURE — 87636 SARSCOV2 & INF A&B AMP PRB: CPT | Performed by: PHYSICIAN ASSISTANT

## 2023-02-17 RX ORDER — CEFDINIR 300 MG/1
300 CAPSULE ORAL 2 TIMES DAILY
Qty: 20 CAPSULE | Refills: 0 | Status: SHIPPED | OUTPATIENT
Start: 2023-02-17 | End: 2023-02-17 | Stop reason: SDUPTHER

## 2023-02-17 RX ORDER — CEFDINIR 300 MG/1
300 CAPSULE ORAL 2 TIMES DAILY
Qty: 20 CAPSULE | Refills: 0 | Status: SHIPPED | OUTPATIENT
Start: 2023-02-17 | End: 2023-02-27

## 2023-02-17 RX ORDER — SODIUM CHLORIDE 0.9 % (FLUSH) 0.9 %
10 SYRINGE (ML) INJECTION AS NEEDED
Status: DISCONTINUED | OUTPATIENT
Start: 2023-02-17 | End: 2023-02-17

## 2023-02-17 RX ADMIN — CEFTRIAXONE 2 G: 2 INJECTION, POWDER, FOR SOLUTION INTRAMUSCULAR; INTRAVENOUS at 13:01

## 2023-02-17 RX ADMIN — SODIUM CHLORIDE 500 ML: 9 INJECTION, SOLUTION INTRAVENOUS at 13:01

## 2023-02-17 NOTE — ED NOTES
[facility], pt placed on list for return transport to [facility].

## 2023-02-17 NOTE — ED PROVIDER NOTES
Subjective   History of Present Illness  This is a 73 year old female patient who presents to the ER with chief complaint of AMS. PMH significant for HTN, HLD, dementia, GERD, DM requiring insulin. The patient is sent from the NH via EMS for altered mental status. The patient herself complains of cough. Staff from NH say that she was staring into space and somewhat more confused than usual. She denies chest pain, SOB, fever. Today, she is oriented to herself but not place and time which according to NH staff is her baseline.         Review of Systems   Unable to perform ROS: Dementia       Past Medical History:   Diagnosis Date   • COPD (chronic obstructive pulmonary disease) ([facility]ly [facility])    • Dementia ([facility])    • Diverticulosis    • Essential hypertension    • GERD (gastroesophageal reflux disease)    • Osteoporosis    • Paroxysmal atrial fibrillation (HCC)    • Type II diabetes mellitus (Formerly [facility])        No Known Allergies    Past Surgical History:   Procedure Laterality Date   • APPENDECTOMY     • CHOLECYSTECTOMY     • TONSILLECTOMY         Family History   Problem Relation Age of Onset   • Cerebral aneurysm Mother    • Heart failure Father    • Diabetes Father        Social History     Socioeconomic History   • Marital status:    Tobacco Use   • Smoking status: Never   • Smokeless tobacco: Never   Substance and Sexual Activity   • Alcohol use: Never   • Drug use: Never   • Sexual activity: Defer           Objective   Physical Exam  Vitals and nursing note reviewed.   Constitutional:       General: She is not in acute distress.     Appearance: She is well-developed. She is not diaphoretic.   HENT:      Head: Normocephalic and atraumatic.      Right Ear: External ear normal.      Left Ear: External ear normal.      Nose: Nose normal.   Eyes:      Conjunctiva/sclera: Conjunctivae normal.      Pupils: Pupils are equal, round, and reactive to light.   Neck:      Vascular: No JVD.      Trachea: No tracheal deviation.    Cardiovascular:      Rate and Rhythm: Normal rate and regular rhythm.      Heart sounds: Normal heart sounds. No murmur heard.  Pulmonary:      Effort: Pulmonary effort is normal. No respiratory distress.      Breath sounds: Normal breath sounds. No wheezing.   Abdominal:      General: Bowel sounds are normal.      Palpations: Abdomen is soft.      Tenderness: There is no abdominal tenderness.   Musculoskeletal:         General: No deformity. Normal range of motion.      Cervical back: Normal range of motion and neck supple.   Skin:     General: Skin is warm and dry.      Coloration: Skin is not pale.      Findings: No erythema or rash.   Neurological:      General: No focal deficit present.      Mental Status: She is alert and oriented to person, place, and time. Mental status is at baseline.      Cranial Nerves: No cranial nerve deficit.      Sensory: No sensory deficit.      Motor: No weakness.      Coordination: Coordination normal.      Gait: Gait normal.      Deep Tendon Reflexes: Reflexes normal.   Psychiatric:         Behavior: Behavior normal.         Thought Content: Thought content normal.         Procedures        Results for orders placed or performed during the hospital encounter of 02/17/23   COVID-19 and FLU A/B PCR - Swab, Nasopharynx    Specimen: Nasopharynx; Swab   Result Value Ref Range    COVID19 Detected (C) Not Detected - Ref. Range    Influenza A PCR Not Detected Not Detected    Influenza B PCR Not Detected Not Detected   Comprehensive Metabolic Panel    Specimen: Blood   Result Value Ref Range    Glucose 316 (H) 65 - 99 mg/dL    BUN 15 8 - 23 mg/dL    Creatinine 0.97 0.57 - 1.00 mg/dL    Sodium 135 (L) 136 - 145 mmol/L    Potassium 3.6 3.5 - 5.2 mmol/L    Chloride 94 (L) 98 - 107 mmol/L    CO2 27.9 22.0 - 29.0 mmol/L    Calcium 9.5 8.6 - 10.5 mg/dL    Total Protein 7.0 6.0 - 8.5 g/dL    Albumin 3.4 (L) 3.5 - 5.2 g/dL    ALT (SGPT) 16 1 - 33 U/L    AST (SGOT) 17 1 - 32 U/L    Alkaline  Phosphatase 126 (H) 39 - 117 U/L    Total Bilirubin 0.6 0.0 - 1.2 mg/dL    Globulin 3.6 gm/dL    A/G Ratio 0.9 g/dL    BUN/Creatinine Ratio 15.5 7.0 - 25.0    Anion Gap 13.1 5.0 - 15.0 mmol/L    eGFR 61.1 >60.0 mL/min/1.73   Urinalysis With Microscopic If Indicated (No Culture) - Urine, Clean Catch    Specimen: Urine, Clean Catch   Result Value Ref Range    Color, UA Yellow Yellow, Straw    Appearance, UA Clear Clear    pH, UA 5.5 5.0 - 8.0    Specific Gravity, UA 1.010 1.005 - 1.030    Glucose, UA Negative Negative    Ketones, UA Negative Negative    Bilirubin, UA Negative Negative    Blood, UA Negative Negative    Protein, UA Negative Negative    Leuk Esterase, UA Negative Negative    Nitrite, UA Positive (A) Negative    Urobilinogen, UA 0.2 E.U./dL 0.2 - 1.0 E.U./dL   C-reactive Protein    Specimen: Blood   Result Value Ref Range    C-Reactive Protein 0.80 (H) 0.00 - 0.50 mg/dL   CBC Auto Differential    Specimen: Blood   Result Value Ref Range    WBC 11.03 (H) 3.40 - 10.80 10*3/mm3    RBC 4.35 3.77 - 5.28 10*6/mm3    Hemoglobin 13.0 12.0 - 15.9 g/dL    Hematocrit 39.8 34.0 - 46.6 %    MCV 91.5 79.0 - 97.0 fL    MCH 29.9 26.6 - 33.0 pg    MCHC 32.7 31.5 - 35.7 g/dL    RDW 12.5 12.3 - 15.4 %    RDW-SD 41.8 37.0 - 54.0 fl    MPV 8.8 6.0 - 12.0 fL    Platelets 256 140 - 450 10*3/mm3    Neutrophil % 67.1 42.7 - 76.0 %    Lymphocyte % 22.8 19.6 - 45.3 %    Monocyte % 5.7 5.0 - 12.0 %    Eosinophil % 3.6 0.3 - 6.2 %    Basophil % 0.5 0.0 - 1.5 %    Immature Grans % 0.3 0.0 - 0.5 %    Neutrophils, Absolute 7.41 (H) 1.70 - 7.00 10*3/mm3    Lymphocytes, Absolute 2.51 0.70 - 3.10 10*3/mm3    Monocytes, Absolute 0.63 0.10 - 0.90 10*3/mm3    Eosinophils, Absolute 0.40 0.00 - 0.40 10*3/mm3    Basophils, Absolute 0.05 0.00 - 0.20 10*3/mm3    Immature Grans, Absolute 0.03 0.00 - 0.05 10*3/mm3    nRBC 0.0 0.0 - 0.2 /100 WBC   Urinalysis, Microscopic Only - Urine, Clean Catch    Specimen: Urine, Clean Catch   Result Value Ref  Range    RBC, UA 0-2 None Seen, 0-2 /HPF    WBC, UA 3-5 (A) None Seen, 0-2 /HPF    Bacteria, UA 4+ (A) None Seen /HPF    Squamous Epithelial Cells, UA 0-2 None Seen, 0-2 /HPF    Hyaline Casts, UA None Seen None Seen /LPF    Methodology Automated Microscopy          ED Course  ED Course as of 02/17/23 1409   Fri Feb 17, 2023   1143 CT Head Without Contrast  IMPRESSION:      1. Cerebral atrophy  2. No mass, hemorrhage, or midline shift  3. Bilateral maxillary sinus disease     This report was finalized on 2/17/2023 11:37 AM by Dr. Gregorio Lopes MD [MM]   1250 XR Chest 1 View  IMPRESSION:  No radiographic evidence of acute cardiac or pulmonary disease.     This report was finalized on 2/17/2023 12:27 PM by Dr. Gregorio Lopes MD [MM]   1326 Patient diagnosed with COVID-19 earlier in the month so likely still positive from that diagnosis.  [MM]   1404 Patient diagnosed with UTI. Will be d/c home with rx for omnicef. Will f/u with PCP in 2 days or return to ER if symptoms worsen.  [MM]      ED Course User Index  [MM] Elaina Oconnell PA                                           Medical Decision Making    This is a 73 year old female patient who presents to the ER with chief complaint of AMS. PMH significant for HTN, HLD, dementia, GERD, DM requiring insulin. The patient is sent from the NH via EMS for altered mental status. The patient herself complains of cough. Staff from NH say that she was staring into space and somewhat more confused than usual. She denies chest pain, SOB, fever. Today, she is oriented to herself but not place and time which according to NH staff is her baseline.         Acute cystitis without hematuria: acute illness or injury  Amount and/or Complexity of Data Reviewed  Labs: ordered. Decision-making details documented in ED Course.  Radiology: ordered. Decision-making details documented in ED Course.      Risk  Prescription drug management.          Final diagnoses:   Acute cystitis without  hematuria       ED Disposition  ED Disposition     ED Disposition   Discharge    Condition   Stable    Comment   --             Jan Hammonds MD  [address] [id number]  [phone]    In 2 days           Medication List      New Prescriptions    cefdinir 300 MG capsule  Commonly known as: OMNICEF  Take 1 capsule by mouth 2 (Two) Times a Day for 10 days.           Where to Get Your Medications      These medications were sent to [facility] - [place], [place] - [address] [place] [address] - [phone] [facility] [phone] [address] [address]    Phone: [phone]   · cefdinir 300 MG capsule          Elaina Oconnell PA  02/17/23 4157

## 2023-02-23 ENCOUNTER — APPOINTMENT (OUTPATIENT)
Dept: CT IMAGING | Facility: HOSPITAL | Age: 73
End: 2023-02-23
Payer: MEDICARE

## 2023-02-23 ENCOUNTER — HOSPITAL ENCOUNTER (EMERGENCY)
Facility: HOSPITAL | Age: 73
Discharge: NURSING, REHAB OR LONG-TERM CARE | End: 2023-02-23
Attending: EMERGENCY MEDICINE | Admitting: EMERGENCY MEDICINE
Payer: MEDICARE

## 2023-02-23 ENCOUNTER — LAB REQUISITION (OUTPATIENT)
Dept: LAB | Facility: HOSPITAL | Age: 73
End: 2023-02-23
Payer: MEDICARE

## 2023-02-23 VITALS
BODY MASS INDEX: 49.61 KG/M2 | TEMPERATURE: 98.7 F | SYSTOLIC BLOOD PRESSURE: 140 MMHG | WEIGHT: 280 LBS | RESPIRATION RATE: 18 BRPM | DIASTOLIC BLOOD PRESSURE: 69 MMHG | OXYGEN SATURATION: 98 % | HEART RATE: 76 BPM | HEIGHT: 63 IN

## 2023-02-23 DIAGNOSIS — S09.90XA INJURY OF HEAD, INITIAL ENCOUNTER: Primary | ICD-10-CM

## 2023-02-23 DIAGNOSIS — I10 ESSENTIAL (PRIMARY) HYPERTENSION: ICD-10-CM

## 2023-02-23 LAB
ANION GAP SERPL CALCULATED.3IONS-SCNC: 11.4 MMOL/L (ref 5–15)
BASOPHILS # BLD AUTO: 0.03 10*3/MM3 (ref 0–0.2)
BASOPHILS NFR BLD AUTO: 0.3 % (ref 0–1.5)
BUN SERPL-MCNC: 19 MG/DL (ref 8–23)
BUN/CREAT SERPL: 19 (ref 7–25)
CALCIUM SPEC-SCNC: 9.7 MG/DL (ref 8.6–10.5)
CHLORIDE SERPL-SCNC: 95 MMOL/L (ref 98–107)
CO2 SERPL-SCNC: 30.6 MMOL/L (ref 22–29)
CREAT SERPL-MCNC: 1 MG/DL (ref 0.57–1)
CRP SERPL-MCNC: 2.3 MG/DL (ref 0–0.5)
DEPRECATED RDW RBC AUTO: 41 FL (ref 37–54)
EGFRCR SERPLBLD CKD-EPI 2021: 58.9 ML/MIN/1.73
EOSINOPHIL # BLD AUTO: 0.46 10*3/MM3 (ref 0–0.4)
EOSINOPHIL NFR BLD AUTO: 4.6 % (ref 0.3–6.2)
ERYTHROCYTE [DISTWIDTH] IN BLOOD BY AUTOMATED COUNT: 12.5 % (ref 12.3–15.4)
ERYTHROCYTE [SEDIMENTATION RATE] IN BLOOD: 67 MM/HR (ref 0–30)
GLUCOSE SERPL-MCNC: 299 MG/DL (ref 65–99)
HCT VFR BLD AUTO: 42.2 % (ref 34–46.6)
HGB BLD-MCNC: 13.9 G/DL (ref 12–15.9)
IMM GRANULOCYTES # BLD AUTO: 0.02 10*3/MM3 (ref 0–0.05)
IMM GRANULOCYTES NFR BLD AUTO: 0.2 % (ref 0–0.5)
LYMPHOCYTES # BLD AUTO: 2.54 10*3/MM3 (ref 0.7–3.1)
LYMPHOCYTES NFR BLD AUTO: 25.3 % (ref 19.6–45.3)
MCH RBC QN AUTO: 29.8 PG (ref 26.6–33)
MCHC RBC AUTO-ENTMCNC: 32.9 G/DL (ref 31.5–35.7)
MCV RBC AUTO: 90.4 FL (ref 79–97)
MONOCYTES # BLD AUTO: 0.56 10*3/MM3 (ref 0.1–0.9)
MONOCYTES NFR BLD AUTO: 5.6 % (ref 5–12)
NEUTROPHILS NFR BLD AUTO: 6.43 10*3/MM3 (ref 1.7–7)
NEUTROPHILS NFR BLD AUTO: 64 % (ref 42.7–76)
NRBC BLD AUTO-RTO: 0 /100 WBC (ref 0–0.2)
PLATELET # BLD AUTO: 274 10*3/MM3 (ref 140–450)
PMV BLD AUTO: 9 FL (ref 6–12)
POTASSIUM SERPL-SCNC: 3.8 MMOL/L (ref 3.5–5.2)
RBC # BLD AUTO: 4.67 10*6/MM3 (ref 3.77–5.28)
SODIUM SERPL-SCNC: 137 MMOL/L (ref 136–145)
WBC NRBC COR # BLD: 10.04 10*3/MM3 (ref 3.4–10.8)

## 2023-02-23 PROCEDURE — 70450 CT HEAD/BRAIN W/O DYE: CPT | Performed by: RADIOLOGY

## 2023-02-23 PROCEDURE — 86140 C-REACTIVE PROTEIN: CPT | Performed by: NURSE PRACTITIONER

## 2023-02-23 PROCEDURE — 80048 BASIC METABOLIC PNL TOTAL CA: CPT | Performed by: NURSE PRACTITIONER

## 2023-02-23 PROCEDURE — 85025 COMPLETE CBC W/AUTO DIFF WBC: CPT | Performed by: NURSE PRACTITIONER

## 2023-02-23 PROCEDURE — 99283 EMERGENCY DEPT VISIT LOW MDM: CPT

## 2023-02-23 PROCEDURE — 85652 RBC SED RATE AUTOMATED: CPT | Performed by: NURSE PRACTITIONER

## 2023-02-23 PROCEDURE — 70450 CT HEAD/BRAIN W/O DYE: CPT

## 2023-03-06 ENCOUNTER — LAB REQUISITION (OUTPATIENT)
Dept: LAB | Facility: HOSPITAL | Age: 73
End: 2023-03-06
Payer: MEDICARE

## 2023-03-06 DIAGNOSIS — R71.8 OTHER ABNORMALITY OF RED BLOOD CELLS: ICD-10-CM

## 2023-03-06 DIAGNOSIS — E72.20 DISORDER OF UREA CYCLE METABOLISM, UNSPECIFIED: ICD-10-CM

## 2023-03-06 DIAGNOSIS — R79.82 ELEVATED C-REACTIVE PROTEIN (CRP): ICD-10-CM

## 2023-03-06 DIAGNOSIS — R68.89 OTHER GENERAL SYMPTOMS AND SIGNS: ICD-10-CM

## 2023-03-06 LAB
ALBUMIN SERPL-MCNC: 3.5 G/DL (ref 3.5–5.2)
ALBUMIN/GLOB SERPL: 1 G/DL
ALP SERPL-CCNC: 130 U/L (ref 39–117)
ALT SERPL W P-5'-P-CCNC: 15 U/L (ref 1–33)
AMMONIA BLD-SCNC: 79 UMOL/L (ref 11–51)
ANION GAP SERPL CALCULATED.3IONS-SCNC: 7.1 MMOL/L (ref 5–15)
AST SERPL-CCNC: 16 U/L (ref 1–32)
BASOPHILS # BLD MANUAL: 0.13 10*3/MM3 (ref 0–0.2)
BASOPHILS NFR BLD MANUAL: 1 % (ref 0–1.5)
BILIRUB SERPL-MCNC: 0.4 MG/DL (ref 0–1.2)
BUN SERPL-MCNC: 16 MG/DL (ref 8–23)
BUN/CREAT SERPL: 18 (ref 7–25)
CALCIUM SPEC-SCNC: 9.3 MG/DL (ref 8.6–10.5)
CHLORIDE SERPL-SCNC: 100 MMOL/L (ref 98–107)
CO2 SERPL-SCNC: 31.9 MMOL/L (ref 22–29)
CREAT SERPL-MCNC: 0.89 MG/DL (ref 0.57–1)
CRP SERPL-MCNC: <0.3 MG/DL (ref 0–0.5)
DEPRECATED RDW RBC AUTO: 42.8 FL (ref 37–54)
EGFRCR SERPLBLD CKD-EPI 2021: 67.7 ML/MIN/1.73
EOSINOPHIL # BLD MANUAL: 0.38 10*3/MM3 (ref 0–0.4)
EOSINOPHIL NFR BLD MANUAL: 3 % (ref 0.3–6.2)
ERYTHROCYTE [DISTWIDTH] IN BLOOD BY AUTOMATED COUNT: 12.8 % (ref 12.3–15.4)
GLOBULIN UR ELPH-MCNC: 3.4 GM/DL
GLUCOSE SERPL-MCNC: 148 MG/DL (ref 65–99)
HCT VFR BLD AUTO: 39.9 % (ref 34–46.6)
HGB BLD-MCNC: 12.9 G/DL (ref 12–15.9)
HYPOCHROMIA BLD QL: ABNORMAL
LYMPHOCYTES # BLD MANUAL: 4.48 10*3/MM3 (ref 0.7–3.1)
LYMPHOCYTES NFR BLD MANUAL: 9 % (ref 5–12)
MCH RBC QN AUTO: 29.8 PG (ref 26.6–33)
MCHC RBC AUTO-ENTMCNC: 32.3 G/DL (ref 31.5–35.7)
MCV RBC AUTO: 92.1 FL (ref 79–97)
MONOCYTES # BLD: 1.15 10*3/MM3 (ref 0.1–0.9)
NEUTROPHILS # BLD AUTO: 6.66 10*3/MM3 (ref 1.7–7)
NEUTROPHILS NFR BLD MANUAL: 52 % (ref 42.7–76)
PLAT MORPH BLD: NORMAL
PLATELET # BLD AUTO: 307 10*3/MM3 (ref 140–450)
PMV BLD AUTO: 8.8 FL (ref 6–12)
POTASSIUM SERPL-SCNC: 3.7 MMOL/L (ref 3.5–5.2)
PROT SERPL-MCNC: 6.9 G/DL (ref 6–8.5)
RBC # BLD AUTO: 4.33 10*6/MM3 (ref 3.77–5.28)
SCAN SLIDE: NORMAL
SODIUM SERPL-SCNC: 139 MMOL/L (ref 136–145)
VARIANT LYMPHS NFR BLD MANUAL: 35 % (ref 19.6–45.3)
WBC NRBC COR # BLD: 12.81 10*3/MM3 (ref 3.4–10.8)

## 2023-03-06 PROCEDURE — 80053 COMPREHEN METABOLIC PANEL: CPT | Performed by: INTERNAL MEDICINE

## 2023-03-06 PROCEDURE — 82140 ASSAY OF AMMONIA: CPT | Performed by: INTERNAL MEDICINE

## 2023-03-06 PROCEDURE — 86140 C-REACTIVE PROTEIN: CPT | Performed by: INTERNAL MEDICINE

## 2023-03-06 PROCEDURE — 85025 COMPLETE CBC W/AUTO DIFF WBC: CPT | Performed by: INTERNAL MEDICINE

## 2023-03-07 NOTE — ED PROVIDER NOTES
Subjective   History of Present Illness  Pt attempted to walk to bathroom without walker and fell and hit head and has a small lac. No loc.   Patient has past medical history of diabetes, GERD, osteoporosis, COPD, atrial fibs, hypertension, dementia.    History provided by:  Patient   used: No    Head Injury  Location:  Generalized  Time since incident:  2 hours  Pain details:     Quality:  Dull    Severity:  Mild    Timing:  Constant    Progression:  Unchanged  Chronicity:  New  Relieved by:  Nothing  Worsened by:  Nothing  Ineffective treatments:  None tried  Risk factors: being elderly        Review of Systems    Past Medical History:   Diagnosis Date   • COPD (chronic obstructive pulmonary disease) (HCC)    • Dementia (HCC)    • Diverticulosis    • Essential hypertension    • GERD (gastroesophageal reflux disease)    • Osteoporosis    • Paroxysmal atrial fibrillation (HCC)    • Type II diabetes mellitus (HCC)        No Known Allergies    Past Surgical History:   Procedure Laterality Date   • APPENDECTOMY     • CHOLECYSTECTOMY     • TONSILLECTOMY         Family History   Problem Relation Age of Onset   • Cerebral aneurysm Mother    • Heart failure Father    • Diabetes Father        Social History     Socioeconomic History   • Marital status:    Tobacco Use   • Smoking status: Never   • Smokeless tobacco: Never   Substance and Sexual Activity   • Alcohol use: Never   • Drug use: Never   • Sexual activity: Defer           Objective   Physical Exam  Vitals and nursing note reviewed.   Constitutional:       Appearance: She is well-developed.   HENT:      Head: Normocephalic.   Cardiovascular:      Rate and Rhythm: Normal rate and regular rhythm.   Pulmonary:      Effort: Pulmonary effort is normal.      Breath sounds: Normal breath sounds.   Abdominal:      General: Bowel sounds are normal.      Palpations: Abdomen is soft.      Tenderness: There is no abdominal tenderness.    Musculoskeletal:         General: Normal range of motion.      Cervical back: Neck supple.   Skin:     General: Skin is warm and dry.   Neurological:      Mental Status: She is alert and oriented to person, place, and time.   Psychiatric:         Behavior: Behavior normal.         Thought Content: Thought content normal.         Judgment: Judgment normal.         Procedures           ED Course      CT Head Without Contrast   Final Result   1.  No CT evidence of acute intracranial abnormality.   2.  Left posterior occipital scalp hematoma/laceration but no underlying   calvarial fracture.   3.  Senescent changes.   4.  Acute on chronic bilateral maxillary sinusitis.       This report was finalized on 2/23/2023 3:54 PM by Dr. Jericho Piper MD.                  Results for orders placed or performed in visit on 02/23/23   Basic Metabolic Panel    Specimen: Blood   Result Value Ref Range    Glucose 299 (H) 65 - 99 mg/dL    BUN 19 8 - 23 mg/dL    Creatinine 1.00 0.57 - 1.00 mg/dL    Sodium 137 136 - 145 mmol/L    Potassium 3.8 3.5 - 5.2 mmol/L    Chloride 95 (L) 98 - 107 mmol/L    CO2 30.6 (H) 22.0 - 29.0 mmol/L    Calcium 9.7 8.6 - 10.5 mg/dL    BUN/Creatinine Ratio 19.0 7.0 - 25.0    Anion Gap 11.4 5.0 - 15.0 mmol/L    eGFR 58.9 (L) >60.0 mL/min/1.73   Sedimentation Rate    Specimen: Blood   Result Value Ref Range    Sed Rate 67 (H) 0 - 30 mm/hr   C-reactive Protein    Specimen: Blood   Result Value Ref Range    C-Reactive Protein 2.30 (H) 0.00 - 0.50 mg/dL   CBC Auto Differential    Specimen: Blood   Result Value Ref Range    WBC 10.04 3.40 - 10.80 10*3/mm3    RBC 4.67 3.77 - 5.28 10*6/mm3    Hemoglobin 13.9 12.0 - 15.9 g/dL    Hematocrit 42.2 34.0 - 46.6 %    MCV 90.4 79.0 - 97.0 fL    MCH 29.8 26.6 - 33.0 pg    MCHC 32.9 31.5 - 35.7 g/dL    RDW 12.5 12.3 - 15.4 %    RDW-SD 41.0 37.0 - 54.0 fl    MPV 9.0 6.0 - 12.0 fL    Platelets 274 140 - 450 10*3/mm3    Neutrophil % 64.0 42.7 - 76.0 %    Lymphocyte % 25.3 19.6  - 45.3 %    Monocyte % 5.6 5.0 - 12.0 %    Eosinophil % 4.6 0.3 - 6.2 %    Basophil % 0.3 0.0 - 1.5 %    Immature Grans % 0.2 0.0 - 0.5 %    Neutrophils, Absolute 6.43 1.70 - 7.00 10*3/mm3    Lymphocytes, Absolute 2.54 0.70 - 3.10 10*3/mm3    Monocytes, Absolute 0.56 0.10 - 0.90 10*3/mm3    Eosinophils, Absolute 0.46 (H) 0.00 - 0.40 10*3/mm3    Basophils, Absolute 0.03 0.00 - 0.20 10*3/mm3    Immature Grans, Absolute 0.02 0.00 - 0.05 10*3/mm3    nRBC 0.0 0.0 - 0.2 /100 WBC                                   Medical Decision Making  Pt attempted to walk to bathroom without walker and fell and hit head and has a small lac. No loc.   Patient has past medical history of diabetes, GERD, osteoporosis, COPD, atrial fibs, hypertension, dementia.    Injury of head, initial encounter: complicated acute illness or injury  Amount and/or Complexity of Data Reviewed  Labs:  Decision-making details documented in ED Course.  Radiology: ordered. Decision-making details documented in ED Course.      Risk  Risk Details: Phuong Kwon    Patient is stable.  Patient is medically cleared.  No EMC exist.  Patient is discharged home.  Patient's diagnostic results were discussed with him and they demonstrated understanding of diagnosis and treatment plan.  Patient was advised to return to the ER or follow-up with PCP if symptoms worsen or fail to improve as expected.        Final diagnoses:   Injury of head, initial encounter       ED Disposition  ED Disposition     ED Disposition   Discharge    Condition   Stable    Comment   --             Jan Hammonds MD  [address] [facility] [address]  [phone]    In 2 days           Medication List      ASK your doctor about these medications    cefdinir 300 MG capsule  Commonly known as: OMNICEF  Take 1 capsule by mouth 2 (Two) Times a Day for 10 days.  Ask about: Should I take this medication?             Phuong Kwon PA  03/06/23 5102

## 2023-04-11 ENCOUNTER — HOSPITAL ENCOUNTER (OUTPATIENT)
Facility: HOSPITAL | Age: 73
Setting detail: HOSPITAL OUTPATIENT SURGERY
Discharge: HOME | End: 2023-04-11
Attending: INTERNAL MEDICINE | Admitting: INTERNAL MEDICINE
Payer: MEDICARE

## 2023-04-11 ENCOUNTER — ANESTHESIA (OUTPATIENT)
Dept: PERIOP | Facility: HOSPITAL | Age: 73
End: 2023-04-11
Payer: MEDICARE

## 2023-04-11 ENCOUNTER — ANESTHESIA EVENT (OUTPATIENT)
Dept: PERIOP | Facility: HOSPITAL | Age: 73
End: 2023-04-11
Payer: MEDICARE

## 2023-04-11 VITALS
HEIGHT: 63 IN | RESPIRATION RATE: 20 BRPM | WEIGHT: 280 LBS | DIASTOLIC BLOOD PRESSURE: 54 MMHG | SYSTOLIC BLOOD PRESSURE: 143 MMHG | OXYGEN SATURATION: 96 % | TEMPERATURE: 97.7 F | HEART RATE: 75 BPM | BODY MASS INDEX: 49.61 KG/M2

## 2023-04-11 DIAGNOSIS — R13.19 ESOPHAGEAL DYSPHAGIA: ICD-10-CM

## 2023-04-11 LAB — GLUCOSE BLDC GLUCOMTR-MCNC: 255 MG/DL (ref 70–130)

## 2023-04-11 PROCEDURE — 88305 TISSUE EXAM BY PATHOLOGIST: CPT

## 2023-04-11 PROCEDURE — 25010000002 PROPOFOL 10 MG/ML EMULSION: Performed by: NURSE ANESTHETIST, CERTIFIED REGISTERED

## 2023-04-11 PROCEDURE — 43239 EGD BIOPSY SINGLE/MULTIPLE: CPT | Performed by: INTERNAL MEDICINE

## 2023-04-11 PROCEDURE — 82962 GLUCOSE BLOOD TEST: CPT

## 2023-04-11 RX ORDER — FENTANYL CITRATE 50 UG/ML
50 INJECTION, SOLUTION INTRAMUSCULAR; INTRAVENOUS
Status: DISCONTINUED | OUTPATIENT
Start: 2023-04-11 | End: 2023-04-11 | Stop reason: HOSPADM

## 2023-04-11 RX ORDER — OXYCODONE HYDROCHLORIDE AND ACETAMINOPHEN 5; 325 MG/1; MG/1
1 TABLET ORAL ONCE AS NEEDED
Status: DISCONTINUED | OUTPATIENT
Start: 2023-04-11 | End: 2023-04-11 | Stop reason: HOSPADM

## 2023-04-11 RX ORDER — ONDANSETRON 2 MG/ML
4 INJECTION INTRAMUSCULAR; INTRAVENOUS AS NEEDED
Status: DISCONTINUED | OUTPATIENT
Start: 2023-04-11 | End: 2023-04-11 | Stop reason: HOSPADM

## 2023-04-11 RX ORDER — MEPERIDINE HYDROCHLORIDE 25 MG/ML
12.5 INJECTION INTRAMUSCULAR; INTRAVENOUS; SUBCUTANEOUS
Status: DISCONTINUED | OUTPATIENT
Start: 2023-04-11 | End: 2023-04-11 | Stop reason: HOSPADM

## 2023-04-11 RX ORDER — LIDOCAINE HYDROCHLORIDE 20 MG/ML
INJECTION, SOLUTION EPIDURAL; INFILTRATION; INTRACAUDAL; PERINEURAL AS NEEDED
Status: DISCONTINUED | OUTPATIENT
Start: 2023-04-11 | End: 2023-04-11 | Stop reason: SURG

## 2023-04-11 RX ORDER — IPRATROPIUM BROMIDE AND ALBUTEROL SULFATE 2.5; .5 MG/3ML; MG/3ML
3 SOLUTION RESPIRATORY (INHALATION) ONCE AS NEEDED
Status: DISCONTINUED | OUTPATIENT
Start: 2023-04-11 | End: 2023-04-11 | Stop reason: HOSPADM

## 2023-04-11 RX ORDER — MIDAZOLAM HYDROCHLORIDE 1 MG/ML
0.5 INJECTION INTRAMUSCULAR; INTRAVENOUS
Status: DISCONTINUED | OUTPATIENT
Start: 2023-04-11 | End: 2023-04-11 | Stop reason: HOSPADM

## 2023-04-11 RX ORDER — SODIUM CHLORIDE, SODIUM LACTATE, POTASSIUM CHLORIDE, CALCIUM CHLORIDE 600; 310; 30; 20 MG/100ML; MG/100ML; MG/100ML; MG/100ML
125 INJECTION, SOLUTION INTRAVENOUS ONCE
Status: COMPLETED | OUTPATIENT
Start: 2023-04-11 | End: 2023-04-11

## 2023-04-11 RX ORDER — SODIUM CHLORIDE, SODIUM LACTATE, POTASSIUM CHLORIDE, CALCIUM CHLORIDE 600; 310; 30; 20 MG/100ML; MG/100ML; MG/100ML; MG/100ML
100 INJECTION, SOLUTION INTRAVENOUS ONCE AS NEEDED
Status: DISCONTINUED | OUTPATIENT
Start: 2023-04-11 | End: 2023-04-11 | Stop reason: HOSPADM

## 2023-04-11 RX ORDER — SODIUM CHLORIDE 9 MG/ML
40 INJECTION, SOLUTION INTRAVENOUS AS NEEDED
Status: DISCONTINUED | OUTPATIENT
Start: 2023-04-11 | End: 2023-04-11 | Stop reason: HOSPADM

## 2023-04-11 RX ORDER — SODIUM CHLORIDE 0.9 % (FLUSH) 0.9 %
10 SYRINGE (ML) INJECTION AS NEEDED
Status: DISCONTINUED | OUTPATIENT
Start: 2023-04-11 | End: 2023-04-11 | Stop reason: HOSPADM

## 2023-04-11 RX ORDER — SODIUM CHLORIDE 0.9 % (FLUSH) 0.9 %
10 SYRINGE (ML) INJECTION EVERY 12 HOURS SCHEDULED
Status: DISCONTINUED | OUTPATIENT
Start: 2023-04-11 | End: 2023-04-11 | Stop reason: HOSPADM

## 2023-04-11 RX ADMIN — SODIUM CHLORIDE, POTASSIUM CHLORIDE, SODIUM LACTATE AND CALCIUM CHLORIDE: 600; 310; 30; 20 INJECTION, SOLUTION INTRAVENOUS at 11:54

## 2023-04-11 RX ADMIN — LIDOCAINE HYDROCHLORIDE 60 MG: 20 INJECTION, SOLUTION EPIDURAL; INFILTRATION; INTRACAUDAL; PERINEURAL at 11:54

## 2023-04-11 RX ADMIN — PROPOFOL 200 MCG/KG/MIN: 10 INJECTION, EMULSION INTRAVENOUS at 11:54

## 2023-04-11 NOTE — ANESTHESIA PREPROCEDURE EVALUATION
Anesthesia Evaluation     Patient summary reviewed and Nursing notes reviewed   no history of anesthetic complications:  NPO Solid Status: > 8 hours  NPO Liquid Status: > 8 hours           Airway   Mallampati: III  TM distance: <3 FB  Neck ROM: full  Possible difficult intubation and Large neck circumference  Dental    (+) poor dentition    Pulmonary    (+) COPD, shortness of breath, decreased breath sounds,   Cardiovascular - normal exam    ECG reviewed  Patient on routine beta blocker and Beta blocker given within 24 hours of surgery  Rhythm: regular  Rate: normal    (+) hypertension, dysrhythmias Paroxysmal Atrial Fib, MATOS, hyperlipidemia,       Neuro/Psych  (+) psychiatric history Depression, dementia,    GI/Hepatic/Renal/Endo    (+) obesity, morbid obesity (BMI 50 kg/m2), GERD,  renal disease, diabetes mellitus type 2 poorly controlled using insulin,     Musculoskeletal     Abdominal   (+) obese,    Substance History - negative use     OB/GYN negative ob/gyn ROS         Other   arthritis,      ROS/Med Hx Other: Echo 12/14/2021:  Left ventricular wall thickness is consistent with mild concentric hypertrophy.  Left ventricular ejection fraction appears to be greater than 70%. Left ventricular systolic function is normal.  Left ventricular diastolic function is consistent with (grade I) impaired relaxation.  Saline test results are negative.                    Anesthesia Plan    ASA 4     general   total IV anesthesia  intravenous induction     Anesthetic plan, risks, benefits, and alternatives have been provided, discussed and informed consent has been obtained with: patient.  Pre-procedure education provided  Plan discussed with CRNA.        CODE STATUS:

## 2023-04-11 NOTE — H&P
[facility]    Patient Identification:  Name:  Andreea Marshall  Age:  73 y.o.  Sex:  female  :  1950  MRN:  [id number]   Visit Number:  [id number]  Primary Care Physician:  Jan Hammonds MD       Chief complaint: Dysphagia, GERD    History of presenting illness: Ms. Marshall is a 73 y.o. female nursing home resident with dementia who presents today for EGD for evaluation of dysphagia and GERD.  Ms. Marshall is accompanied by a caregiver today. At her most recent GI outpatient follow up, patient reported having dysphagia which has been a chronic issue for her. She had esophagram done on 23 which showed GERD with normal morphology, motility and no stricture or mass. She is currently taking Dexilant 60 mg PO daily. At present, she denies having any specific complaints.     Review of Systems   Constitutional: Negative for appetite change and unexpected weight change.   HENT: Positive for trouble swallowing. Negative for sore throat and voice change.    Eyes: Negative.    Respiratory: Negative.    Cardiovascular: Negative.    Gastrointestinal: Negative for abdominal distention, abdominal pain, anal bleeding, blood in stool, constipation, diarrhea, nausea, rectal pain and vomiting.   Endocrine: Negative.    Genitourinary: Negative.    Musculoskeletal: Negative.    Skin: Negative.    Allergic/Immunologic: Negative.    Neurological: Negative.    Hematological: Negative.    Psychiatric/Behavioral: Negative.         Past Medical History:   Diagnosis Date   • COPD (chronic obstructive pulmonary disease)    • Dementia    • Diverticulosis    • Essential hypertension    • GERD (gastroesophageal reflux disease)    • Osteoporosis    • Paroxysmal atrial fibrillation    • Type II diabetes mellitus      Past Surgical History:   Procedure Laterality Date   • APPENDECTOMY     • CHOLECYSTECTOMY     • TONSILLECTOMY       Family History   Problem Relation Age of Onset   • Cerebral  aneurysm Mother    • Heart failure Father    • Diabetes Father      Social History     Socioeconomic History   • Marital status:    Tobacco Use   • Smoking status: Never   • Smokeless tobacco: Never   Substance and Sexual Activity   • Alcohol use: Never   • Drug use: Never   • Sexual activity: Defer       Allergies:  Patient has no known allergies.    Prior to Admission Medications     Prescriptions Last Dose Informant Patient Reported? Taking?    albuterol sulfate  (90 Base) MCG/ACT inhaler  Nursing Home Yes No    Inhale 2 puffs Every 6 (Six) Hours As Needed for Wheezing.    amLODIPine (NORVASC) 5 MG tablet   No No    Take 1 tablet by mouth Daily.    aspirin 325 MG tablet   No No    Take 1 tablet by mouth Daily.    atorvastatin (LIPITOR) 40 MG tablet   No No    Take 1 tablet by mouth Every Night.    benzonatate (TESSALON) 100 MG capsule   Yes No    Take 100 mg by mouth 3 (Three) Times a Day As Needed for Cough.    budesonide (PULMICORT) 180 MCG/ACT inhaler  Nursing Home Yes No    Inhale 1 puff 2 (Two) Times a Day.    dexlansoprazole (DEXILANT) 60 MG capsule   No No    Take 1 capsule by mouth Every Morning Before Breakfast.    donepezil (ARICEPT) 5 MG tablet  Nursing Home Yes No    Take 5 mg by mouth Daily.    DULoxetine (CYMBALTA) 30 MG capsule   No No    Take 1 capsule by mouth Daily.    ezetimibe (Zetia) 10 MG tablet   No No    Take 1 tablet by mouth Daily.    furosemide (LASIX) 20 MG tablet  Nursing Home Yes No    Take 20 mg by mouth Every Other Day.    insulin glargine (LANTUS, SEMGLEE) 100 UNIT/ML injection  Nursing Home Yes No    Inject 45 Units under the skin into the appropriate area as directed 2 (Two) Times a Day.    insulin lispro (humaLOG) 100 UNIT/ML injection  Nursing Home Yes No    Inject 0-12 Units under the skin into the appropriate area as directed 3 (Three) Times a Day Before Meals.    insulin lispro (humaLOG) 100 UNIT/ML injection  Medication Bottle Yes No    Inject 14 Units under  "the skin into the appropriate area as directed 3 (Three) Times a Day Before Meals.    ipratropium-albuterol (DUO-NEB) 0.5-2.5 mg/3 ml nebulizer  Nursing Home Yes No    Take 3 mL by nebulization 4 (Four) Times a Day.    midodrine (PROAMATINE) 5 MG tablet  Nursing Home Yes No    Take 5 mg by mouth 2 (Two) Times a Day.    montelukast (SINGULAIR) 10 MG tablet  Nursing Home Yes No    Take 10 mg by mouth Daily.    potassium chloride 10 MEQ CR tablet  Nursing Home Yes No    Take 10 mEq by mouth Daily.    sotalol (BETAPACE) 80 MG tablet  Nursing Home No No    Take 1 tablet by mouth Every 12 (Twelve) Hours.    tiZANidine (ZANAFLEX) 2 MG half tablet  Nursing Home Yes No    Take 2 mg by mouth 2 (two) times a day.    triamterene-hydrochlorothiazide (MAXZIDE-25) 37.5-25 MG per tablet  Nursing Home Yes No    Take 1 tablet by mouth Daily.        Hospital Scheduled Meds:    No current facility-administered medications for this encounter.      Vital Signs:      Vitals:    04/11/23 1052   BP: 147/63   BP Location: Right arm   Patient Position: Lying   Pulse: 71   Resp: 20   Temp: 99 °F (37.2 °C)   TempSrc: Tympanic   SpO2: 95%   Weight: 127 kg (280 lb)   Height: 160 cm (63\")     Body mass index is 49.6 kg/m².    Physical Exam:  Constitutional: +Dementia.  Alert, oriented to self.  Well developed and well nourished, in no acute distress.  HENT:  Head: Normocephalic and atraumatic.  Mouth:  Moist mucous membranes.  OP clear, mmm  Eyes:  Conjunctivae and EOM are normal.  Pupils are equal, round, and reactive to light.  No scleral icterus.  Neck:  Neck supple.  No JVD present.    Cardiovascular:  RRR, no MRG.  Pulmonary/Chest:  CTAB, unlabored.   Abdominal:  Soft.  Bowel sounds are normal.  No distension and no tenderness.   Musculoskeletal:  No edema, no tenderness, and no deformity.   Neurological:  MS as above, grossly nonfocal exam       Lab Results   Component Value Date    HGBA1C 9.80 (H) 02/17/2023     Lab Results   Component " Value Date    TSH 2.570 11/18/2022    FREET4 0.94 06/30/2021       Imaging Results (Last 7 Days)     ** No results found for the last 168 hours. **          Assessment and Plan:  1. Proceed with EGD for evaluation of dysphagia and GERD.     Gauri Wren, APRN  04/11/23  10:25 EDT

## 2023-04-11 NOTE — ANESTHESIA POSTPROCEDURE EVALUATION
Patient: Andreea Marshall    Procedure Summary     Date: 04/11/23 Room / Location:  [facility]  /  [facility]    Anesthesia Start: 1153 Anesthesia Stop: 1204    Procedure: ESOPHAGOGASTRODUODENOSCOPY WITH BIOPSY (Esophagus) Diagnosis:       Esophageal dysphagia      (Esophageal dysphagia [R13.19])    Surgeons: Radha Zavaleta MD Provider: Apolinar Georges DO    Anesthesia Type: general ASA Status: 4          Anesthesia Type: general    Vitals  Vitals Value Taken Time   /54 04/11/23 1236   Temp 97.7 °F (36.5 °C) 04/11/23 1206   Pulse 75 04/11/23 1236   Resp 20 04/11/23 1236   SpO2 96 % 04/11/23 1236           Post Anesthesia Care and Evaluation    Patient location during evaluation: PHASE II  Patient participation: complete - patient participated  Level of consciousness: awake and alert  Pain score: 1  Pain management: adequate    Airway patency: patent  Anesthetic complications: No anesthetic complications  PONV Status: controlled  Cardiovascular status: acceptable  Respiratory status: acceptable  Hydration status: acceptable

## 2023-04-11 NOTE — ANESTHESIA POSTPROCEDURE EVALUATION
Patient: Andreea Marshall    Procedure Summary     Date: 04/11/23 Room / Location: [facility] OR  /  [facility] OR    Anesthesia Start: 1153 Anesthesia Stop: 1204    Procedure: ESOPHAGOGASTRODUODENOSCOPY WITH BIOPSY (Esophagus) Diagnosis:       Esophageal dysphagia      (Esophageal dysphagia [R13.19])    Surgeons: Radha Zavaleta MD Provider: Apolinar Georges DO    Anesthesia Type: general ASA Status: 4          Anesthesia Type: general    Vitals  No vitals data found for the desired time range.          Post Anesthesia Care and Evaluation    Patient location during evaluation: PHASE II  Patient participation: complete - patient participated  Level of consciousness: awake and alert  Pain score: 0  Pain management: adequate    Airway patency: patent  Anesthetic complications: No anesthetic complications    Cardiovascular status: acceptable  Respiratory status: acceptable  Hydration status: acceptable

## 2023-04-12 LAB — REF LAB TEST METHOD: NORMAL

## 2023-04-12 NOTE — PROGRESS NOTES
At the time of your recent upper endoscopy biopsies were taken of the esophagus which revealed minimal chronic inflammation from acid reflux.  There was no narrowing of the esophagus.  Please continue taking Dexilant once every day.

## 2023-06-01 ENCOUNTER — APPOINTMENT (OUTPATIENT)
Dept: GENERAL RADIOLOGY | Facility: HOSPITAL | Age: 73
End: 2023-06-01
Payer: MEDICARE

## 2023-06-01 ENCOUNTER — HOSPITAL ENCOUNTER (EMERGENCY)
Facility: HOSPITAL | Age: 73
Discharge: NURSING, REHAB OR LONG-TERM CARE | End: 2023-06-01
Attending: EMERGENCY MEDICINE
Payer: MEDICARE

## 2023-06-01 ENCOUNTER — APPOINTMENT (OUTPATIENT)
Dept: CT IMAGING | Facility: HOSPITAL | Age: 73
End: 2023-06-01
Payer: MEDICARE

## 2023-06-01 VITALS
TEMPERATURE: 98.3 F | HEART RATE: 73 BPM | SYSTOLIC BLOOD PRESSURE: 132 MMHG | RESPIRATION RATE: 20 BRPM | DIASTOLIC BLOOD PRESSURE: 66 MMHG | WEIGHT: 280 LBS | HEIGHT: 64 IN | BODY MASS INDEX: 47.8 KG/M2 | OXYGEN SATURATION: 94 %

## 2023-06-01 DIAGNOSIS — S80.01XA CONTUSION OF RIGHT KNEE, INITIAL ENCOUNTER: ICD-10-CM

## 2023-06-01 DIAGNOSIS — S00.83XA TRAUMATIC HEMATOMA OF FOREHEAD, INITIAL ENCOUNTER: Primary | ICD-10-CM

## 2023-06-01 DIAGNOSIS — S80.12XA CONTUSION OF LEFT LOWER LEG, INITIAL ENCOUNTER: ICD-10-CM

## 2023-06-01 PROCEDURE — 73562 X-RAY EXAM OF KNEE 3: CPT | Performed by: RADIOLOGY

## 2023-06-01 PROCEDURE — 99284 EMERGENCY DEPT VISIT MOD MDM: CPT

## 2023-06-01 PROCEDURE — 70450 CT HEAD/BRAIN W/O DYE: CPT | Performed by: RADIOLOGY

## 2023-06-01 PROCEDURE — 73590 X-RAY EXAM OF LOWER LEG: CPT

## 2023-06-01 PROCEDURE — 70450 CT HEAD/BRAIN W/O DYE: CPT

## 2023-06-01 PROCEDURE — 73590 X-RAY EXAM OF LOWER LEG: CPT | Performed by: RADIOLOGY

## 2023-06-01 PROCEDURE — 73562 X-RAY EXAM OF KNEE 3: CPT

## 2023-06-01 RX ORDER — HYDROCODONE BITARTRATE AND ACETAMINOPHEN 5; 325 MG/1; MG/1
1 TABLET ORAL ONCE
Status: COMPLETED | OUTPATIENT
Start: 2023-06-01 | End: 2023-06-01

## 2023-06-01 RX ADMIN — HYDROCODONE BITARTRATE AND ACETAMINOPHEN 1 TABLET: 5; 325 TABLET ORAL at 07:34

## 2023-06-01 NOTE — ED PROVIDER NOTES
Subjective   History of Present Illness  Patient is 73-year-old female sent from a local nursing home for evaluation after a fall.  Patient states that she fell out of bed.  She complains of a knot on the right side of her forehead, pain in her right knee, pain in her left lower leg.  She does not think she lost consciousness, she is not certain.  She denies neck pain, back pain, chest pain, shortness of breath, abdominal pain, vomiting, focal numbness or weakness, other symptoms, other injuries or other complaints.  She asks for pain medication.        Review of Systems   All other systems reviewed and are negative.      Past Medical History:   Diagnosis Date   • A-fib    • Chronic kidney disease    • COPD (chronic obstructive pulmonary disease)    • Dementia    • Depression    • Diverticulosis    • Essential hypertension    • GERD (gastroesophageal reflux disease)    • Hyperlipidemia    • Osteoporosis    • Paroxysmal atrial fibrillation    • Type II diabetes mellitus        No Known Allergies    Past Surgical History:   Procedure Laterality Date   • APPENDECTOMY     • CHOLECYSTECTOMY     • ENDOSCOPY N/A 4/11/2023    Procedure: ESOPHAGOGASTRODUODENOSCOPY WITH BIOPSY;  Surgeon: Radha Zavaleta MD;  Location: [facility];  Service: Gastroenterology;  Laterality: N/A;   • TONSILLECTOMY         Family History   Problem Relation Age of Onset   • Cerebral aneurysm Mother    • Heart failure Father    • Diabetes Father        Social History     Socioeconomic History   • Marital status:    Tobacco Use   • Smoking status: Never   • Smokeless tobacco: Never   Substance and Sexual Activity   • Alcohol use: Never   • Drug use: Never   • Sexual activity: Defer           Objective   Physical Exam  Vitals and nursing note reviewed.   Constitutional:       General: She is not in acute distress.     Appearance: She is well-developed. She is not toxic-appearing or diaphoretic.   HENT:      Head: Normocephalic.       Comments: Right forehead hematoma, no bony deformity.  The head and face are otherwise nontender and atraumatic.  Eyes:      General: No scleral icterus.     Extraocular Movements: Extraocular movements intact.      Pupils: Pupils are equal, round, and reactive to light.   Neck:      Trachea: No tracheal deviation.      Comments: Nontender to palpation throughout.  Cardiovascular:      Rate and Rhythm: Normal rate and regular rhythm.   Pulmonary:      Effort: Pulmonary effort is normal. No respiratory distress.      Breath sounds: Normal breath sounds.   Chest:      Chest wall: No tenderness.   Abdominal:      General: Bowel sounds are normal.      Palpations: Abdomen is soft.      Tenderness: There is no abdominal tenderness. There is no guarding or rebound.   Musculoskeletal:         General: No tenderness. Normal range of motion.      Cervical back: Normal range of motion and neck supple. No rigidity or tenderness.      Right lower leg: No edema.      Left lower leg: No edema.      Comments: Right knee is tender and bruised anteriorly, no deformity, no appreciable effusion, no appreciable ligamentous laxity.  Left anterior lower leg, roughly at the junction of the middle and distal thirds is swollen bruised and tender, no bony deformity.  The back is nontender throughout.  The pelvis is stable and nontender.  The extremities are otherwise are nontender and atraumatic.  There is no other musculoskeletal tenderness.  Neurovascular intact throughout.   Skin:     General: Skin is warm and dry.      Capillary Refill: Capillary refill takes less than 2 seconds.      Coloration: Skin is not pale.   Neurological:      General: No focal deficit present.      Mental Status: She is alert.      GCS: GCS eye subscore is 4. GCS verbal subscore is 5. GCS motor subscore is 6.      Motor: No abnormal muscle tone.      Comments: Patient is oriented to person, hospital, situation.  She is not oriented to city or to time    Psychiatric:         Mood and Affect: Mood normal.         Behavior: Behavior normal.         Procedures  XR Tibia Fibula 2 View Left   Final Result     No acute findings in the left tibia and fibula or surrounding soft   tissues.       This report was finalized on 6/1/2023 8:33 AM by Dr. Gregorio Lopes MD.          XR Knee 3 View Right   Final Result   1.  Extensive arthritic change with lateral joint space narrowing and   patellofemoral narrowing.   2.  No acute fracture or dislocation.       This report was finalized on 6/1/2023 8:32 AM by Dr. Gregorio Lopes MD.          CT Head Without Contrast   Final Result        1. No parenchymal mass hemorrhage or midline shift   2. Maxillary and left ethmoidal sinusitis   3. Right frontal scalp hematoma       This report was finalized on 6/1/2023 8:49 AM by Dr. Gregorio Lopes MD.                     ED Course  ED Course as of 06/01/23 0924   Thu Jun 01, 2023 0924 Patient's emergency department stay has been uneventful.  She has shown no signs of acute distress.  She is discharged back to [facility]. [CM]      ED Course User Index  [CM] Colt Jo MD                                   [facility] reviewed by Colt Jo MD       [facility]    Final diagnoses:   Traumatic hematoma of forehead, initial encounter   Contusion of right knee, initial encounter   Contusion of left lower leg, initial encounter       ED Disposition  ED Disposition     ED Disposition   Discharge    Condition   Stable    Comment   --             Jan Hammonds MD  [address] [facility] [address]  [phone]    Go in 2 days      [facility]  [address] [place] [id number] [phone]  Go to   If symptoms worsen         Medication List      No changes were made to your prescriptions during this visit.       Please note that portions of this note were completed with a voice recognition program.        Colt Jo,  MD  06/01/23 0924

## 2023-06-01 NOTE — ED NOTES
Pt only oriented to self at time of assessment. Pt on RA and displaying no s/s of labored breathing.  Pt compliant with assessment and medication regimen.  Pt denying pain at time of assessment and any further needs.  Call light within reach, fall precautions in place, frequent rounding in place.    Pt's daughter Dulce called for an update and requests to be called when x-rays and CT scan results.

## 2023-06-01 NOTE — ED NOTES
Report given to Marco, EMT with [facility]. Pt is clean and dry and in no acute distress with VS stable. Pt being transferred to EMS stretcher at this time.

## (undated) DEVICE — THE BITE BLOCK MAXI, LATEX FREE STRAP IS USED TO PROTECT THE ENDOSCOPE INSERTION TUBE FROM BEING BITTEN BY THE PATIENT.

## (undated) DEVICE — Device

## (undated) DEVICE — Device: Brand: DEFENDO AIR/WATER/SUCTION AND BIOPSY VALVE